# Patient Record
Sex: FEMALE | Race: WHITE | Employment: FULL TIME | URBAN - METROPOLITAN AREA
[De-identification: names, ages, dates, MRNs, and addresses within clinical notes are randomized per-mention and may not be internally consistent; named-entity substitution may affect disease eponyms.]

---

## 2018-03-13 PROBLEM — K58.9 IRRITABLE BOWEL SYNDROME: Status: ACTIVE | Noted: 2018-03-13

## 2018-03-13 PROBLEM — J30.9 ALLERGIC RHINITIS: Status: ACTIVE | Noted: 2018-03-13

## 2018-03-13 PROBLEM — E78.2 MIXED HYPERLIPIDEMIA: Status: ACTIVE | Noted: 2018-03-13

## 2018-03-13 PROBLEM — K21.9 ESOPHAGEAL REFLUX: Status: ACTIVE | Noted: 2018-03-13

## 2018-03-13 PROBLEM — F17.200 TOBACCO USE DISORDER: Status: ACTIVE | Noted: 2018-03-13

## 2018-03-13 PROBLEM — R56.9 ALCOHOL WITHDRAWAL SEIZURE, UNCOMPLICATED (HCC): Status: ACTIVE | Noted: 2018-03-13

## 2018-03-13 PROBLEM — F41.1 GENERALIZED ANXIETY DISORDER: Status: ACTIVE | Noted: 2018-03-13

## 2018-03-13 PROBLEM — F10.930 ALCOHOL WITHDRAWAL SEIZURE, UNCOMPLICATED (HCC): Status: ACTIVE | Noted: 2018-03-13

## 2018-03-13 PROBLEM — B00.2 HERPETIC GINGIVOSTOMATITIS: Status: ACTIVE | Noted: 2018-03-13

## 2018-03-19 PROBLEM — D53.9 MACROCYTIC ANEMIA: Status: ACTIVE | Noted: 2018-03-19

## 2018-03-20 PROBLEM — Z86.59 HX OF MAJOR DEPRESSION: Status: ACTIVE | Noted: 2018-03-20

## 2018-03-26 PROBLEM — D53.9 MACROCYTIC ANEMIA: Status: RESOLVED | Noted: 2018-03-19 | Resolved: 2018-03-26

## 2018-03-26 PROBLEM — K58.9 IRRITABLE BOWEL SYNDROME: Status: RESOLVED | Noted: 2018-03-13 | Resolved: 2018-03-26

## 2018-03-26 PROBLEM — F17.200 TOBACCO USE DISORDER: Status: RESOLVED | Noted: 2018-03-13 | Resolved: 2018-03-26

## 2018-03-26 PROBLEM — F10.930 ALCOHOL WITHDRAWAL SEIZURE, UNCOMPLICATED (HCC): Status: RESOLVED | Noted: 2018-03-13 | Resolved: 2018-03-26

## 2018-03-26 PROBLEM — F41.1 GENERALIZED ANXIETY DISORDER: Status: RESOLVED | Noted: 2018-03-13 | Resolved: 2018-03-26

## 2018-03-26 PROBLEM — K21.9 ESOPHAGEAL REFLUX: Status: RESOLVED | Noted: 2018-03-13 | Resolved: 2018-03-26

## 2018-03-26 PROBLEM — B00.2 HERPETIC GINGIVOSTOMATITIS: Status: RESOLVED | Noted: 2018-03-13 | Resolved: 2018-03-26

## 2018-03-26 PROBLEM — R56.9 ALCOHOL WITHDRAWAL SEIZURE, UNCOMPLICATED (HCC): Status: RESOLVED | Noted: 2018-03-13 | Resolved: 2018-03-26

## 2018-03-26 PROBLEM — Z86.59 HX OF MAJOR DEPRESSION: Status: RESOLVED | Noted: 2018-03-20 | Resolved: 2018-03-26

## 2018-03-26 PROBLEM — E78.2 MIXED HYPERLIPIDEMIA: Status: RESOLVED | Noted: 2018-03-13 | Resolved: 2018-03-26

## 2018-03-26 PROBLEM — J30.9 ALLERGIC RHINITIS: Status: RESOLVED | Noted: 2018-03-13 | Resolved: 2018-03-26

## 2018-03-27 PROBLEM — F60.7 DEPENDENT PERSONALITY DISORDER (HCC): Status: ACTIVE | Noted: 2018-03-27

## 2018-04-02 PROBLEM — F39 MOOD DISORDER (HCC): Status: ACTIVE | Noted: 2018-04-02

## 2019-03-25 ENCOUNTER — HOSPITAL ENCOUNTER (EMERGENCY)
Age: 54
Discharge: HOME | End: 2019-03-25

## 2019-03-25 ENCOUNTER — APPOINTMENT (OUTPATIENT)
Dept: GENERAL RADIOLOGY | Age: 54
End: 2019-03-25

## 2019-03-25 VITALS
WEIGHT: 190.48 LBS | RESPIRATION RATE: 18 BRPM | HEART RATE: 83 BPM | TEMPERATURE: 98.5 F | HEIGHT: 62 IN | OXYGEN SATURATION: 98 % | BODY MASS INDEX: 35.05 KG/M2 | DIASTOLIC BLOOD PRESSURE: 94 MMHG | SYSTOLIC BLOOD PRESSURE: 138 MMHG

## 2019-03-25 DIAGNOSIS — S20.212A CONTUSION OF LEFT CHEST WALL, INITIAL ENCOUNTER: ICD-10-CM

## 2019-03-25 DIAGNOSIS — S22.42XA CLOSED FRACTURE OF MULTIPLE RIBS OF LEFT SIDE, INITIAL ENCOUNTER: Primary | ICD-10-CM

## 2019-03-25 PROCEDURE — 6370000000 HC RX 637 (ALT 250 FOR IP): Performed by: NURSE PRACTITIONER

## 2019-03-25 PROCEDURE — 99283 EMERGENCY DEPT VISIT LOW MDM: CPT

## 2019-03-25 PROCEDURE — 71101 X-RAY EXAM UNILAT RIBS/CHEST: CPT

## 2019-03-25 RX ORDER — LIDOCAINE 50 MG/G
1 PATCH TOPICAL DAILY
Qty: 30 PATCH | Refills: 0 | Status: ON HOLD | OUTPATIENT
Start: 2019-03-25 | End: 2020-12-01 | Stop reason: HOSPADM

## 2019-03-25 RX ORDER — METHOCARBAMOL 500 MG/1
500 TABLET, FILM COATED ORAL 3 TIMES DAILY
Qty: 30 TABLET | Refills: 0 | Status: SHIPPED | OUTPATIENT
Start: 2019-03-25 | End: 2019-04-04

## 2019-03-25 RX ORDER — TRAMADOL HYDROCHLORIDE 50 MG/1
50 TABLET ORAL ONCE
Status: COMPLETED | OUTPATIENT
Start: 2019-03-25 | End: 2019-03-25

## 2019-03-25 RX ORDER — ORPHENADRINE CITRATE 100 MG/1
100 TABLET, EXTENDED RELEASE ORAL ONCE
Status: COMPLETED | OUTPATIENT
Start: 2019-03-25 | End: 2019-03-25

## 2019-03-25 RX ORDER — TRAMADOL HYDROCHLORIDE 50 MG/1
TABLET ORAL
Qty: 21 TABLET | Refills: 0 | Status: SHIPPED | OUTPATIENT
Start: 2019-03-25 | End: 2019-03-31

## 2019-03-25 RX ADMIN — ORPHENADRINE CITRATE 100 MG: 100 TABLET, EXTENDED RELEASE ORAL at 20:41

## 2019-03-25 RX ADMIN — TRAMADOL HYDROCHLORIDE 50 MG: 50 TABLET, FILM COATED ORAL at 20:40

## 2019-03-25 ASSESSMENT — PAIN DESCRIPTION - LOCATION
LOCATION: RIB CAGE
LOCATION: RIB CAGE

## 2019-03-25 ASSESSMENT — ENCOUNTER SYMPTOMS
SHORTNESS OF BREATH: 1
GASTROINTESTINAL NEGATIVE: 1

## 2019-03-25 ASSESSMENT — PAIN SCALES - GENERAL
PAINLEVEL_OUTOF10: 8
PAINLEVEL_OUTOF10: 10

## 2019-03-25 ASSESSMENT — PAIN DESCRIPTION - FREQUENCY: FREQUENCY: CONTINUOUS

## 2019-03-25 ASSESSMENT — PAIN DESCRIPTION - DESCRIPTORS
DESCRIPTORS: JABBING
DESCRIPTORS: JABBING

## 2019-03-25 ASSESSMENT — PAIN DESCRIPTION - PAIN TYPE
TYPE: ACUTE PAIN
TYPE: ACUTE PAIN

## 2019-03-25 ASSESSMENT — PAIN DESCRIPTION - ONSET: ONSET: ON-GOING

## 2019-03-25 ASSESSMENT — PAIN DESCRIPTION - ORIENTATION
ORIENTATION: LEFT
ORIENTATION: LEFT

## 2019-03-25 ASSESSMENT — PAIN DESCRIPTION - PROGRESSION: CLINICAL_PROGRESSION: RAPIDLY WORSENING

## 2019-05-19 ENCOUNTER — APPOINTMENT (OUTPATIENT)
Dept: CT IMAGING | Age: 54
End: 2019-05-19

## 2019-05-19 ENCOUNTER — HOSPITAL ENCOUNTER (EMERGENCY)
Age: 54
Discharge: HOME | End: 2019-05-19

## 2019-05-19 VITALS
HEART RATE: 83 BPM | SYSTOLIC BLOOD PRESSURE: 155 MMHG | OXYGEN SATURATION: 100 % | DIASTOLIC BLOOD PRESSURE: 98 MMHG | BODY MASS INDEX: 29.07 KG/M2 | RESPIRATION RATE: 18 BRPM | WEIGHT: 158.95 LBS | TEMPERATURE: 97 F

## 2019-05-19 DIAGNOSIS — B37.31 VAGINAL YEAST INFECTION: ICD-10-CM

## 2019-05-19 DIAGNOSIS — R10.12 LEFT UPPER QUADRANT PAIN: Primary | ICD-10-CM

## 2019-05-19 DIAGNOSIS — R11.0 NAUSEA: ICD-10-CM

## 2019-05-19 DIAGNOSIS — A59.01 TRICHOMONAS VAGINALIS (TV) INFECTION: ICD-10-CM

## 2019-05-19 LAB
A/G RATIO: 1.5 (ref 1.1–2.2)
ALBUMIN SERPL-MCNC: 4.5 G/DL (ref 3.4–5)
ALP BLD-CCNC: 109 U/L (ref 40–129)
ALT SERPL-CCNC: 28 U/L (ref 10–40)
ANION GAP SERPL CALCULATED.3IONS-SCNC: 12 MMOL/L (ref 3–16)
AST SERPL-CCNC: 23 U/L (ref 15–37)
BACTERIA WET PREP: ABNORMAL
BACTERIA: ABNORMAL /HPF
BASOPHILS ABSOLUTE: 0.1 K/UL (ref 0–0.2)
BASOPHILS RELATIVE PERCENT: 0.8 %
BILIRUB SERPL-MCNC: 0.6 MG/DL (ref 0–1)
BILIRUBIN URINE: NEGATIVE
BLOOD, URINE: NEGATIVE
BUN BLDV-MCNC: 16 MG/DL (ref 7–20)
CALCIUM SERPL-MCNC: 9.9 MG/DL (ref 8.3–10.6)
CHLORIDE BLD-SCNC: 103 MMOL/L (ref 99–110)
CLARITY: ABNORMAL
CLUE CELLS: ABNORMAL
CO2: 24 MMOL/L (ref 21–32)
COLOR: ABNORMAL
COMMENT UA: ABNORMAL
CREAT SERPL-MCNC: 0.6 MG/DL (ref 0.6–1.1)
CRYSTALS, UA: ABNORMAL /HPF
EOSINOPHILS ABSOLUTE: 0.3 K/UL (ref 0–0.6)
EOSINOPHILS RELATIVE PERCENT: 4.6 %
EPITHELIAL CELLS WET PREP: ABNORMAL
EPITHELIAL CELLS, UA: 11 /HPF (ref 0–5)
GFR AFRICAN AMERICAN: >60
GFR NON-AFRICAN AMERICAN: >60
GLOBULIN: 3.1 G/DL
GLUCOSE BLD-MCNC: 85 MG/DL (ref 70–99)
GLUCOSE URINE: NEGATIVE MG/DL
HCT VFR BLD CALC: 44.4 % (ref 36–48)
HEMOGLOBIN: 15.3 G/DL (ref 12–16)
HYALINE CASTS: 10 /LPF (ref 0–8)
KETONES, URINE: NEGATIVE MG/DL
LACTIC ACID: 0.8 MMOL/L (ref 0.4–2)
LEUKOCYTE ESTERASE, URINE: ABNORMAL
LYMPHOCYTES ABSOLUTE: 2.1 K/UL (ref 1–5.1)
LYMPHOCYTES RELATIVE PERCENT: 28.8 %
MCH RBC QN AUTO: 34.2 PG (ref 26–34)
MCHC RBC AUTO-ENTMCNC: 34.4 G/DL (ref 31–36)
MCV RBC AUTO: 99.5 FL (ref 80–100)
MICROSCOPIC EXAMINATION: YES
MONOCYTES ABSOLUTE: 0.5 K/UL (ref 0–1.3)
MONOCYTES RELATIVE PERCENT: 7.4 %
NEUTROPHILS ABSOLUTE: 4.3 K/UL (ref 1.7–7.7)
NEUTROPHILS RELATIVE PERCENT: 58.4 %
NITRITE, URINE: NEGATIVE
PDW BLD-RTO: 12.8 % (ref 12.4–15.4)
PH UA: 5.5 (ref 5–8)
PLATELET # BLD: 392 K/UL (ref 135–450)
PMV BLD AUTO: 8.2 FL (ref 5–10.5)
POTASSIUM SERPL-SCNC: 4.4 MMOL/L (ref 3.5–5.1)
PROTEIN UA: NEGATIVE MG/DL
RBC # BLD: 4.47 M/UL (ref 4–5.2)
RBC UA: ABNORMAL /HPF (ref 0–2)
RBC WET PREP: ABNORMAL
SODIUM BLD-SCNC: 139 MMOL/L (ref 136–145)
SOURCE WET PREP: ABNORMAL
SPECIFIC GRAVITY UA: 1.03 (ref 1–1.03)
TOTAL PROTEIN: 7.6 G/DL (ref 6.4–8.2)
TRICHOMONAS PREP: ABNORMAL
URINE REFLEX TO CULTURE: YES
URINE TYPE: ABNORMAL
UROBILINOGEN, URINE: 1 E.U./DL
WBC # BLD: 7.4 K/UL (ref 4–11)
WBC UA: 14 /HPF (ref 0–5)
WBC WET PREP: ABNORMAL
YEAST WET PREP: ABNORMAL

## 2019-05-19 PROCEDURE — 2580000003 HC RX 258: Performed by: NURSE PRACTITIONER

## 2019-05-19 PROCEDURE — 99284 EMERGENCY DEPT VISIT MOD MDM: CPT

## 2019-05-19 PROCEDURE — 96374 THER/PROPH/DIAG INJ IV PUSH: CPT

## 2019-05-19 PROCEDURE — 6370000000 HC RX 637 (ALT 250 FOR IP): Performed by: NURSE PRACTITIONER

## 2019-05-19 PROCEDURE — 87210 SMEAR WET MOUNT SALINE/INK: CPT

## 2019-05-19 PROCEDURE — 74177 CT ABD & PELVIS W/CONTRAST: CPT

## 2019-05-19 PROCEDURE — 83605 ASSAY OF LACTIC ACID: CPT

## 2019-05-19 PROCEDURE — 6360000002 HC RX W HCPCS: Performed by: NURSE PRACTITIONER

## 2019-05-19 PROCEDURE — 36415 COLL VENOUS BLD VENIPUNCTURE: CPT

## 2019-05-19 PROCEDURE — 96361 HYDRATE IV INFUSION ADD-ON: CPT

## 2019-05-19 PROCEDURE — 96375 TX/PRO/DX INJ NEW DRUG ADDON: CPT

## 2019-05-19 PROCEDURE — 80053 COMPREHEN METABOLIC PANEL: CPT

## 2019-05-19 PROCEDURE — 87086 URINE CULTURE/COLONY COUNT: CPT

## 2019-05-19 PROCEDURE — 6360000004 HC RX CONTRAST MEDICATION: Performed by: NURSE PRACTITIONER

## 2019-05-19 PROCEDURE — 81001 URINALYSIS AUTO W/SCOPE: CPT

## 2019-05-19 PROCEDURE — 85025 COMPLETE CBC W/AUTO DIFF WBC: CPT

## 2019-05-19 RX ORDER — DICYCLOMINE HYDROCHLORIDE 10 MG/1
10 CAPSULE ORAL 4 TIMES DAILY PRN
Qty: 30 CAPSULE | Refills: 0 | Status: ON HOLD | OUTPATIENT
Start: 2019-05-19 | End: 2020-12-01 | Stop reason: HOSPADM

## 2019-05-19 RX ORDER — 0.9 % SODIUM CHLORIDE 0.9 %
1000 INTRAVENOUS SOLUTION INTRAVENOUS ONCE
Status: COMPLETED | OUTPATIENT
Start: 2019-05-19 | End: 2019-05-19

## 2019-05-19 RX ORDER — DULOXETIN HYDROCHLORIDE 30 MG/1
30 CAPSULE, DELAYED RELEASE ORAL DAILY
Status: ON HOLD | COMMUNITY
End: 2020-12-01 | Stop reason: HOSPADM

## 2019-05-19 RX ORDER — KETOROLAC TROMETHAMINE 30 MG/ML
15 INJECTION, SOLUTION INTRAMUSCULAR; INTRAVENOUS ONCE
Status: COMPLETED | OUTPATIENT
Start: 2019-05-19 | End: 2019-05-19

## 2019-05-19 RX ORDER — IBUPROFEN 600 MG/1
600 TABLET ORAL EVERY 6 HOURS PRN
Qty: 30 TABLET | Refills: 0 | Status: ON HOLD | OUTPATIENT
Start: 2019-05-19 | End: 2020-12-01 | Stop reason: HOSPADM

## 2019-05-19 RX ORDER — ONDANSETRON 2 MG/ML
4 INJECTION INTRAMUSCULAR; INTRAVENOUS ONCE
Status: COMPLETED | OUTPATIENT
Start: 2019-05-19 | End: 2019-05-19

## 2019-05-19 RX ORDER — FLUCONAZOLE 150 MG/1
150 TABLET ORAL ONCE
Qty: 2 TABLET | Refills: 0 | Status: SHIPPED | OUTPATIENT
Start: 2019-05-19 | End: 2019-05-19

## 2019-05-19 RX ORDER — DULOXETIN HYDROCHLORIDE 60 MG/1
60 CAPSULE, DELAYED RELEASE ORAL DAILY
Status: ON HOLD | COMMUNITY
End: 2020-12-01 | Stop reason: HOSPADM

## 2019-05-19 RX ORDER — ONDANSETRON 4 MG/1
4-8 TABLET, ORALLY DISINTEGRATING ORAL EVERY 12 HOURS PRN
Qty: 12 TABLET | Refills: 0 | Status: ON HOLD | OUTPATIENT
Start: 2019-05-19 | End: 2020-12-01 | Stop reason: HOSPADM

## 2019-05-19 RX ORDER — METRONIDAZOLE 500 MG/1
2000 TABLET ORAL ONCE
Status: COMPLETED | OUTPATIENT
Start: 2019-05-19 | End: 2019-05-19

## 2019-05-19 RX ADMIN — METRONIDAZOLE 2000 MG: 500 TABLET ORAL at 17:32

## 2019-05-19 RX ADMIN — SODIUM CHLORIDE 1000 ML: 9 INJECTION, SOLUTION INTRAVENOUS at 14:43

## 2019-05-19 RX ADMIN — KETOROLAC TROMETHAMINE 15 MG: 30 INJECTION, SOLUTION INTRAMUSCULAR at 14:42

## 2019-05-19 RX ADMIN — ONDANSETRON 4 MG: 2 INJECTION INTRAMUSCULAR; INTRAVENOUS at 14:42

## 2019-05-19 RX ADMIN — IOPAMIDOL 75 ML: 755 INJECTION, SOLUTION INTRAVENOUS at 14:02

## 2019-05-19 ASSESSMENT — PAIN DESCRIPTION - PROGRESSION
CLINICAL_PROGRESSION: GRADUALLY WORSENING
CLINICAL_PROGRESSION: GRADUALLY WORSENING

## 2019-05-19 ASSESSMENT — ENCOUNTER SYMPTOMS
RESPIRATORY NEGATIVE: 1
VOMITING: 0
NAUSEA: 1
DIARRHEA: 0
ABDOMINAL PAIN: 1

## 2019-05-19 ASSESSMENT — PAIN DESCRIPTION - FREQUENCY
FREQUENCY: CONTINUOUS
FREQUENCY: CONTINUOUS

## 2019-05-19 ASSESSMENT — PAIN DESCRIPTION - LOCATION
LOCATION: ABDOMEN
LOCATION: ABDOMEN

## 2019-05-19 ASSESSMENT — PAIN - FUNCTIONAL ASSESSMENT
PAIN_FUNCTIONAL_ASSESSMENT: ACTIVITIES ARE NOT PREVENTED
PAIN_FUNCTIONAL_ASSESSMENT: ACTIVITIES ARE NOT PREVENTED

## 2019-05-19 ASSESSMENT — PAIN DESCRIPTION - ONSET
ONSET: SUDDEN
ONSET: SUDDEN

## 2019-05-19 ASSESSMENT — PAIN SCALES - GENERAL
PAINLEVEL_OUTOF10: 10
PAINLEVEL_OUTOF10: 8
PAINLEVEL_OUTOF10: 10

## 2019-05-19 ASSESSMENT — PAIN DESCRIPTION - ORIENTATION
ORIENTATION: LEFT;LOWER
ORIENTATION: LEFT;LOWER

## 2019-05-19 ASSESSMENT — PAIN DESCRIPTION - DESCRIPTORS
DESCRIPTORS: THROBBING
DESCRIPTORS: THROBBING

## 2019-05-19 ASSESSMENT — PAIN DESCRIPTION - PAIN TYPE: TYPE: ACUTE PAIN

## 2019-05-19 NOTE — ED PROVIDER NOTES
well-developed and well-nourished. No distress. HENT:   Head: Normocephalic. Eyes: Pupils are equal, round, and reactive to light. Cardiovascular: Normal rate and regular rhythm. Pulmonary/Chest: Effort normal.   Abdominal: Soft. Normal appearance and bowel sounds are normal. She exhibits no mass. There is tenderness in the left upper quadrant. There is no rebound, no guarding and negative Allred's sign. No hernia. Hernia confirmed negative in the ventral area, confirmed negative in the right inguinal area and confirmed negative in the left inguinal area. Negative psoas sign, negative obturator sign, negative roving sign. No palpable masses. No CVA tenderness. Genitourinary:   Genitourinary Comments: Bedside speculum pelvic exam performed with RN chaperone: Palak. External genitalia unremarkable. No bumps lumps or lesions. Internal exam negative for cervical motion tenderness adnexal tenderness, bumps lumps or lesions. Minimal discharge noted. Cervix unremarkable. Neurological: She is alert and oriented to person, place, and time. Skin: Skin is warm. Capillary refill takes less than 2 seconds. She is not diaphoretic. Nursing note and vitals reviewed. DIAGNOSTIC RESULTS     EKG: All EKG's are interpreted by the Emergency Department Physician who either signs or Co-signs this chart in the absence of a cardiologist.    RADIOLOGY:   Non-plain film images such as CT, Ultrasound and MRI are read by the radiologist. Plain radiographic images are visualized and preliminarilyinterpreted by the emergency physician with the below findings:    Interpretation per the Radiologist below,if available at the time of this note:    CT ABDOMEN PELVIS W IV CONTRAST Additional Contrast? None   Final Result   There are a couple mildly dilated loops of small bowel in the left anterior   abdomen which may either be due to focal ileus or low grade/early obstruction.       New small to moderate-sized hiatal hernia. LABS:  Labs Reviewed   WET PREP, GENITAL - Abnormal; Notable for the following components:       Result Value    Trichomonas Prep PRESENT <1+ (*)     Yeast, Wet Prep 1+ (*)     All other components within normal limits    Narrative:     Performed at:  [address] [employer]   Phone [phone]   CBC WITH AUTO DIFFERENTIAL - Abnormal; Notable for the following components:    MCH 34.2 (*)     All other components within normal limits    Narrative:     Performed at:  [address] [facility]   Phone [phone]   URINE RT REFLEX TO CULTURE - Abnormal; Notable for the following components:    Clarity, UA CLOUDY (*)     Leukocyte Esterase, Urine MODERATE (*)     All other components within normal limits    Narrative:     Performed at:  [address] [facility]   Phone [phone]   MICROSCOPIC URINALYSIS - Abnormal; Notable for the following components:    Bacteria, UA 1+ (*)     Crystals 2+ Ca. Oxalate (*)     Hyaline Casts, UA 10 (*)     WBC, UA 14 (*)     Epi Cells 11 (*)     All other components within normal limits    Narrative:     Performed at:  [address] [facility]   Phone [phone]   URINE CULTURE   COMPREHENSIVE METABOLIC PANEL    Narrative:     Performed at:  [address] [facility]   Phone [phone]   LACTIC ACID, PLASMA    Narrative:     Performed at:  [address] [facility]   Phone [phone]       All other labs were within normal range or not returned as of this dictation.     EMERGENCY DEPARTMENT COURSE and DIFFERENTIAL DIAGNOSIS/MDM:   Vitals:    Vitals:    05/19/19 1238 05/19/19 1245 05/19/19 1623   BP:  (!) 154/99 (!) 155/98   Pulse: 80  83   Resp: 18  18   Temp: 97 °F (36.1 °C)     TempSrc: Tympanic     SpO2: 97%  100%   Weight: 158 lb 15.2 oz (72.1 kg)         MDM  Differential diagnosis: Diverticulitis, bowel obstruction, UTI, ureteral obstruction, urolithiasis    Patient was seen and evaluated per myself. Dr. Renetta Medeiros was present and available for consultation as needed. CBC negative for leukocytosis. Chemistry profiles unremarkable without abnormality. No evidence of lactic acidosis. Urine versus indicates moderate leukocytes however the microscopic findings indicate: 1+ bacteria, 14 white blood cells and 11 epithelial cells. I don't believe that this is representative of urinary tract infection. But a culture will be process. Wet prep results: Positive for Trichomonas and positive for yeast. Negative clue cells. CT results negative for evidence of free air or free fluid. No appendicitis. There is some enlargement of the small bowel loops in the left upper quadrant anterior abdomen. That may be consistent with an ileus versus an early uncomplicated obstruction. No evidence of free air or free fluid otherwise. Patient's nausea increasing after being on the nitrofurantoin may be related to the fact that she actually lists this as an allergy or sensitivity. Therefore right believe that that is the reason for the increase in nausea since starting the medication. There is no evidence of acute diverticulitis. There is no true evidence at this time that there is a bowel obstruction. She is able to tolerate food and fluids therefore I do not feel that there is a small bowel obstruction. She's not passing any liquid diarrhea in fact she is passing formed stools. There is no evidence of ureteral obstruction or urolithiasis. No evidence of pancreatitis. No indication of cholecystitis. Given her wet prep results she will receive Flagyl 2 g here in the emergency department.  I will discharge her home with symptomatic treatment Zofran, Bentyl, Diflucan for yeast infection and Motrin. The Motrin Bentyl and Zofran are sent to medical treatment as I discussed with her for the abdominal pain. Her abdominal pain may be very well secondary to mild yeast infection and a Trichomonas pelvic infection. She is discharged home to follow-up with her primary care provider and/or her gynecologist. She is instructed on avoidance of sex for at least 2 weeks and making sure that her partner/partners are treated as well. CONSULTS:  None    PROCEDURES:  Procedures    FINAL IMPRESSION      1. Left upper quadrant pain    2. Nausea    3. Trichomonas vaginalis (TV) infection    4.  Vaginal yeast infection          DISPOSITION/PLAN   [unfilled]    PATIENT REFERRED TO:  MITCHELL Barbour NP  . [address] [place] [address]  [phone]    Schedule an appointment as soon as possible for a visit         DISCHARGE MEDICATIONS:  New Prescriptions    DICYCLOMINE (BENTYL) 10 MG CAPSULE    Take 1 capsule by mouth 4 times daily as needed (abdominal pain)    FLUCONAZOLE (DIFLUCAN) 150 MG TABLET    Take 1 tablet by mouth once for 1 dose Take one and repeat in 1 week    IBUPROFEN (IBU) 600 MG TABLET    Take 1 tablet by mouth every 6 hours as needed for Pain    ONDANSETRON (ZOFRAN ODT) 4 MG DISINTEGRATING TABLET    Take 1-2 tablets by mouth every 12 hours as needed for Nausea       (Please note that portions of this note were completed with a voice recognition program.  Efforts were made to edit the dictations but occasionally words are mis-transcribed.)    MITCHELL Mcclain CNP, MITCHELL Larson CNP  05/19/19 0025

## 2019-05-19 NOTE — LETTER
[facility] Emergency Department  [facility] [address]  Phone: [phone]               May 19, 2019    Patient: Donita Olvera   YOB: 1965   Date of Visit: 5/19/2019       To Whom It May Concern:    Donita Olvera was seen and treated in our emergency department on 5/19/2019. She may return to work on 5/21/19.       Sincerely,       Melonie Kehr, RN         Signature:__________________________________

## 2019-05-20 LAB — URINE CULTURE, ROUTINE: NORMAL

## 2020-11-26 ENCOUNTER — HOSPITAL ENCOUNTER (EMERGENCY)
Age: 55
Discharge: PSYCHIATRIC OR SUBSTANCE USE FACILITY | End: 2020-11-27
Attending: STUDENT IN AN ORGANIZED HEALTH CARE EDUCATION/TRAINING PROGRAM

## 2020-11-26 PROBLEM — F39 UNSPECIFIED MOOD (AFFECTIVE) DISORDER (HCC): Status: ACTIVE | Noted: 2020-11-26

## 2020-11-26 LAB
A/G RATIO: 1.6 (ref 1.1–2.2)
ACETAMINOPHEN LEVEL: <5 UG/ML (ref 10–30)
ACETAMINOPHEN LEVEL: <5 UG/ML (ref 10–30)
ALBUMIN SERPL-MCNC: 4.7 G/DL (ref 3.4–5)
ALP BLD-CCNC: 126 U/L (ref 40–129)
ALT SERPL-CCNC: 37 U/L (ref 10–40)
AMPHETAMINE SCREEN, URINE: ABNORMAL
ANION GAP SERPL CALCULATED.3IONS-SCNC: 13 MMOL/L (ref 3–16)
AST SERPL-CCNC: 35 U/L (ref 15–37)
BARBITURATE SCREEN URINE: ABNORMAL
BASOPHILS ABSOLUTE: 0.1 K/UL (ref 0–0.2)
BASOPHILS RELATIVE PERCENT: 1.4 %
BENZODIAZEPINE SCREEN, URINE: ABNORMAL
BILIRUB SERPL-MCNC: 0.5 MG/DL (ref 0–1)
BILIRUBIN URINE: NEGATIVE
BLOOD, URINE: NEGATIVE
BUN BLDV-MCNC: 9 MG/DL (ref 7–20)
CALCIUM SERPL-MCNC: 10 MG/DL (ref 8.3–10.6)
CANNABINOID SCREEN URINE: POSITIVE
CHLORIDE BLD-SCNC: 103 MMOL/L (ref 99–110)
CLARITY: ABNORMAL
CO2: 25 MMOL/L (ref 21–32)
COCAINE METABOLITE SCREEN URINE: ABNORMAL
COLOR: YELLOW
COMMENT UA: ABNORMAL
CREAT SERPL-MCNC: 0.7 MG/DL (ref 0.6–1.1)
EOSINOPHILS ABSOLUTE: 0.2 K/UL (ref 0–0.6)
EOSINOPHILS RELATIVE PERCENT: 2.1 %
EPITHELIAL CELLS, UA: 11 /HPF (ref 0–5)
ETHANOL: 121 MG/DL (ref 0–0.08)
ETHANOL: 76 MG/DL (ref 0–0.08)
GFR AFRICAN AMERICAN: >60
GFR NON-AFRICAN AMERICAN: >60
GLOBULIN: 3 G/DL
GLUCOSE BLD-MCNC: 99 MG/DL (ref 70–99)
GLUCOSE URINE: NEGATIVE MG/DL
HCG QUALITATIVE: NEGATIVE
HCT VFR BLD CALC: 44.8 % (ref 36–48)
HEMOGLOBIN: 15.3 G/DL (ref 12–16)
HYALINE CASTS: 2 /LPF (ref 0–8)
KETONES, URINE: NEGATIVE MG/DL
LEUKOCYTE ESTERASE, URINE: ABNORMAL
LYMPHOCYTES ABSOLUTE: 3.2 K/UL (ref 1–5.1)
LYMPHOCYTES RELATIVE PERCENT: 33.7 %
Lab: ABNORMAL
MCH RBC QN AUTO: 33.3 PG (ref 26–34)
MCHC RBC AUTO-ENTMCNC: 34.1 G/DL (ref 31–36)
MCV RBC AUTO: 97.5 FL (ref 80–100)
METHADONE SCREEN, URINE: ABNORMAL
MICROSCOPIC EXAMINATION: YES
MONOCYTES ABSOLUTE: 0.6 K/UL (ref 0–1.3)
MONOCYTES RELATIVE PERCENT: 6.2 %
NEUTROPHILS ABSOLUTE: 5.4 K/UL (ref 1.7–7.7)
NEUTROPHILS RELATIVE PERCENT: 56.6 %
NITRITE, URINE: NEGATIVE
OPIATE SCREEN URINE: ABNORMAL
OXYCODONE URINE: ABNORMAL
PDW BLD-RTO: 13.8 % (ref 12.4–15.4)
PH UA: 6
PH UA: 6 (ref 5–8)
PHENCYCLIDINE SCREEN URINE: ABNORMAL
PLATELET # BLD: 313 K/UL (ref 135–450)
PMV BLD AUTO: 8 FL (ref 5–10.5)
POTASSIUM REFLEX MAGNESIUM: 3.6 MMOL/L (ref 3.5–5.1)
PROPOXYPHENE SCREEN: ABNORMAL
PROTEIN UA: NEGATIVE MG/DL
RBC # BLD: 4.6 M/UL (ref 4–5.2)
RBC UA: ABNORMAL /HPF (ref 0–4)
SALICYLATE, SERUM: 0.4 MG/DL (ref 15–30)
SARS-COV-2, NAAT: NOT DETECTED
SODIUM BLD-SCNC: 141 MMOL/L (ref 136–145)
SPECIFIC GRAVITY UA: <1.005 (ref 1–1.03)
TOTAL PROTEIN: 7.7 G/DL (ref 6.4–8.2)
URINE REFLEX TO CULTURE: YES
URINE TYPE: ABNORMAL
UROBILINOGEN, URINE: 0.2 E.U./DL
WBC # BLD: 9.6 K/UL (ref 4–11)
WBC UA: 14 /HPF (ref 0–5)

## 2020-11-26 PROCEDURE — 85025 COMPLETE CBC W/AUTO DIFF WBC: CPT

## 2020-11-26 PROCEDURE — U0002 COVID-19 LAB TEST NON-CDC: HCPCS

## 2020-11-26 PROCEDURE — G0480 DRUG TEST DEF 1-7 CLASSES: HCPCS

## 2020-11-26 PROCEDURE — 93005 ELECTROCARDIOGRAM TRACING: CPT | Performed by: PHYSICIAN ASSISTANT

## 2020-11-26 PROCEDURE — 6370000000 HC RX 637 (ALT 250 FOR IP): Performed by: EMERGENCY MEDICINE

## 2020-11-26 PROCEDURE — 80053 COMPREHEN METABOLIC PANEL: CPT

## 2020-11-26 PROCEDURE — 87086 URINE CULTURE/COLONY COUNT: CPT

## 2020-11-26 PROCEDURE — 87077 CULTURE AEROBIC IDENTIFY: CPT

## 2020-11-26 PROCEDURE — 81001 URINALYSIS AUTO W/SCOPE: CPT

## 2020-11-26 PROCEDURE — 99285 EMERGENCY DEPT VISIT HI MDM: CPT

## 2020-11-26 PROCEDURE — 84703 CHORIONIC GONADOTROPIN ASSAY: CPT

## 2020-11-26 PROCEDURE — 80307 DRUG TEST PRSMV CHEM ANLYZR: CPT

## 2020-11-26 PROCEDURE — 36415 COLL VENOUS BLD VENIPUNCTURE: CPT

## 2020-11-26 RX ORDER — ONDANSETRON 4 MG/1
4 TABLET, ORALLY DISINTEGRATING ORAL ONCE
Status: COMPLETED | OUTPATIENT
Start: 2020-11-26 | End: 2020-11-26

## 2020-11-26 RX ADMIN — ONDANSETRON 4 MG: 4 TABLET, ORALLY DISINTEGRATING ORAL at 23:45

## 2020-11-26 ASSESSMENT — ENCOUNTER SYMPTOMS
BACK PAIN: 0
PHOTOPHOBIA: 0
SHORTNESS OF BREATH: 0
CHEST TIGHTNESS: 0
WHEEZING: 0
COLOR CHANGE: 0
COUGH: 0
DIARRHEA: 0
CONSTIPATION: 0
STRIDOR: 0
RESPIRATORY NEGATIVE: 1
VOMITING: 0
NAUSEA: 0
ABDOMINAL PAIN: 0

## 2020-11-26 NOTE — ED NOTES
PT admitted to taking Neurontin, Vicodin and marijuana. PT very tearful stating she wants to go back to [facility] because they helped her a few years ago. Ubaldo at bedside.       Corina Case RN  11/26/20 2046

## 2020-11-26 NOTE — ED PROVIDER NOTES
MidLevel Attestation   I havepersonally performed and/or participated in the history, exam and medical decision making and agree with all pertinent clinical information. I have also reviewed and agree with the past medical, family and social historyunless otherwise noted. I have personally performed a face to face diagnostic evaluation onthis patient. I have reviewed the mid-levels findings and agree. In brief, Pilar Hercules is a 48 y.o. female that presented to the emergency department with   Chief Complaint   Patient presents with    Suicidal     Pt arrived via EMS with c/o intentional overdose of medication and alcohol in attempt to commit suicide. PT stated she just wants to go see her mom and she thought this would help. I asked where her mom was and she stated she just . PT with superficial cut marks to bilat UE     CONSTITUTIONAL: Well appearing, in no acute distress   EYES: PERRL, No injection, discharge or scleral icterus. NECK: Normal ROM, NO LAD and Moist mucous membranes. CARDIOVASCULAR: Regular rate and rhythm. No murmurs or gallop. PULMONARY/CHEST: Airway patent. No retractions. Breath sounds clear with good air entry bilaterally. ABDOMEN: Soft, Non-distended and non-tender, without guarding or rebound. SKIN: Acyanotic, warm, dry   MUSCULOSKELETAL: No swelling, tenderness or deformity   NEUROLOGICAL: Awake. Pulses intact. Grossly nonfocal     60-year-old female presenting with depression after ingesting multiple doses of medicines as well as consuming large amount of alcohol. Exam is unremarkable. Poison control was consulted and recommended repeat labs after 2 hours in addition to the initial labs. Repeat acetaminophen levels were normal.  At this time, patient is medically cleared to be transferred to psychiatry hospital for further treatment and evaluation.     EKG by my preliminary interpretation shows sinus rhythm with rate of 74, normal axis, normal intervals, with no ST changes indicative of ischemia at this time.       I have reviewed and interpreted all of the currently available lab results and diagnostics from this visit:  Results for orders placed or performed during the hospital encounter of 11/26/20   CBC Auto Differential   Result Value Ref Range    WBC 9.6 4.0 - 11.0 K/uL    RBC 4.60 4.00 - 5.20 M/uL    Hemoglobin 15.3 12.0 - 16.0 g/dL    Hematocrit 44.8 36.0 - 48.0 %    MCV 97.5 80.0 - 100.0 fL    MCH 33.3 26.0 - 34.0 pg    MCHC 34.1 31.0 - 36.0 g/dL    RDW 13.8 12.4 - 15.4 %    Platelets 657 357 - 498 K/uL    MPV 8.0 5.0 - 10.5 fL    Neutrophils % 56.6 %    Lymphocytes % 33.7 %    Monocytes % 6.2 %    Eosinophils % 2.1 %    Basophils % 1.4 %    Neutrophils Absolute 5.4 1.7 - 7.7 K/uL    Lymphocytes Absolute 3.2 1.0 - 5.1 K/uL    Monocytes Absolute 0.6 0.0 - 1.3 K/uL    Eosinophils Absolute 0.2 0.0 - 0.6 K/uL    Basophils Absolute 0.1 0.0 - 0.2 K/uL   Comprehensive Metabolic Panel w/ Reflex to MG   Result Value Ref Range    Sodium 141 136 - 145 mmol/L    Potassium reflex Magnesium 3.6 3.5 - 5.1 mmol/L    Chloride 103 99 - 110 mmol/L    CO2 25 21 - 32 mmol/L    Anion Gap 13 3 - 16    Glucose 99 70 - 99 mg/dL    BUN 9 7 - 20 mg/dL    CREATININE 0.7 0.6 - 1.1 mg/dL    GFR Non-African American >60 >60    GFR African American >60 >60    Calcium 10.0 8.3 - 10.6 mg/dL    Total Protein 7.7 6.4 - 8.2 g/dL    Alb 4.7 3.4 - 5.0 g/dL    Albumin/Globulin Ratio 1.6 1.1 - 2.2    Total Bilirubin 0.5 0.0 - 1.0 mg/dL    Alkaline Phosphatase 126 40 - 129 U/L    ALT 37 10 - 40 U/L    AST 35 15 - 37 U/L    Globulin 3.0 g/dL   Acetaminophen Level   Result Value Ref Range    Acetaminophen Level <5 (L) 10 - 30 ug/mL   Salicylate   Result Value Ref Range    Salicylate, Serum 0.4 (L) 15.0 - 30.0 mg/dL   Ethanol   Result Value Ref Range    Ethanol Lvl 121 mg/dL   Urinalysis Reflex to Culture    Specimen: Urine, clean catch   Result Value Ref Range    Color, UA YELLOW Straw/Yellow    Clarity, UA CLOUDY (A) Clear    Glucose, Ur Negative Negative mg/dL    Bilirubin Urine Negative Negative    Ketones, Urine Negative Negative mg/dL    Specific Gravity, UA <1.005 1.005 - 1.030    Blood, Urine Negative Negative    pH, UA 6.0 5.0 - 8.0    Protein, UA Negative Negative mg/dL    Urobilinogen, Urine 0.2 <2.0 E.U./dL    Nitrite, Urine Negative Negative    Leukocyte Esterase, Urine MODERATE (A) Negative    Microscopic Examination YES     Urine Type NotGiven     Urine Reflex to Culture Yes    Urine Drug Screen   Result Value Ref Range    Amphetamine Screen, Urine Neg Negative <1000ng/mL    Barbiturate Screen, Ur Neg Negative <200 ng/mL    Benzodiazepine Screen, Urine Neg Negative <200 ng/mL    Cannabinoid Scrn, Ur POSITIVE (A) Negative <50 ng/mL    Cocaine Metabolite Screen, Urine Neg Negative <300 ng/mL    Opiate Scrn, Ur Neg Negative <300 ng/mL    PCP Screen, Urine Neg Negative <25 ng/mL    Methadone Screen, Urine Neg Negative <300 ng/mL    Propoxyphene Scrn, Ur Neg Negative <300 ng/mL    Oxycodone Urine Neg Negative <100 ng/ml    pH, UA 6.0     Drug Screen Comment: see below    HCG Qualitative, Serum   Result Value Ref Range    hCG Qual Negative Detects HCG level >10 MIU/mL   Microscopic Urinalysis   Result Value Ref Range    RBC, UA 3-4 0 - 4 /HPF    Urinalysis Comments see below     Hyaline Casts, UA 2 0 - 8 /LPF    WBC, UA 14 (H) 0 - 5 /HPF    Epithelial Cells, UA 11 (H) 0 - 5 /HPF   Acetaminophen Level   Result Value Ref Range    Acetaminophen Level <5 (L) 10 - 30 ug/mL   EKG 12 Lead   Result Value Ref Range    Ventricular Rate 74 BPM    Atrial Rate 74 BPM    P-R Interval 138 ms    QRS Duration 84 ms    Q-T Interval 422 ms    QTc Calculation (Bazett) 468 ms    P Axis 35 degrees    R Axis 50 degrees    T Axis 45 degrees    Diagnosis Normal sinus rhythmNormal ECG      No results found. ED Medication Orders (From admission, onward)    None          Final Impression      1. Suicidal ideation    2.  Drug overdose, intentional self-harm, initial encounter [facility])        DISPOSITION Decision To Transfer 11/26/2020 09:35:48 PM       This record is transcribed utilizing voice recognition technology. There are inherent limitations in this technology. In addition, there may be limitations in editing of this report. If there are any discrepancies, please contact me directly.     García Guadarrama MD   11/26/2020         Alessandra Smith MD  11/26/20 6886

## 2020-11-26 NOTE — ED PROVIDER NOTES
palpitations and leg swelling. Gastrointestinal: Negative for abdominal pain, constipation, diarrhea, nausea and vomiting. Genitourinary: Negative for decreased urine volume, difficulty urinating, dysuria, flank pain, frequency, hematuria and urgency. Musculoskeletal: Negative for arthralgias, back pain, myalgias, neck pain and neck stiffness. Skin: Negative for color change, pallor, rash and wound. Neurological: Negative for dizziness, weakness, light-headedness, numbness and headaches. Psychiatric/Behavioral: Positive for dysphoric mood, self-injury and suicidal ideas. Negative for agitation, behavioral problems, confusion, decreased concentration, hallucinations and sleep disturbance. The patient is not nervous/anxious and is not hyperactive. Positives and Pertinent negatives as per HPI. Except as noted above in the ROS, all other systems were reviewed and negative.        PAST MEDICAL HISTORY     Past Medical History:   Diagnosis Date    Alcohol abuse     Depression     Diverticulosis     DOMINICK (generalized anxiety disorder)     GERD (gastroesophageal reflux disease)     Kidney stone     Tobacco abuse          SURGICAL HISTORY     Past Surgical History:   Procedure Laterality Date    COLONOSCOPY  2006    HYSTERECTOMY           CURRENTMEDICATIONS       Previous Medications    BUSPIRONE (BUSPAR) 10 MG TABLET    Take 1 tablet by mouth 3 times daily    BUSPIRONE (BUSPAR) 10 MG TABLET    Take 1 tablet by mouth 3 times daily    DICYCLOMINE (BENTYL) 10 MG CAPSULE    Take 1 capsule by mouth 4 times daily as needed (abdominal pain)    DULOXETINE (CYMBALTA) 30 MG EXTENDED RELEASE CAPSULE    Take 30 mg by mouth daily    DULOXETINE (CYMBALTA) 60 MG EXTENDED RELEASE CAPSULE    Take 60 mg by mouth daily    ESCITALOPRAM (LEXAPRO) 20 MG TABLET    Take 1 tablet by mouth daily    ESCITALOPRAM (LEXAPRO) 20 MG TABLET    Take 1 tablet by mouth daily    IBUPROFEN (IBU) 600 MG TABLET    Take 1 tablet by mouth every 6 hours as needed for Pain    LIDOCAINE (LIDODERM) 5 %    Place 1 patch onto the skin daily 12 hours on, 12 hours off. MELATONIN 3 MG TABS TABLET    Take 1 tablet by mouth nightly    MELATONIN 3 MG TABS TABLET    Take 1 tablet by mouth nightly    MULTIPLE VITAMIN (MULTIVITAMIN) TABLET    Take 1 tablet by mouth daily    MULTIPLE VITAMINS-MINERALS (THERAPEUTIC MULTIVITAMIN-MINERALS) TABLET    Take 1 tablet by mouth daily    NICOTINE (NICODERM CQ) 14 MG/24HR    Place 1 patch onto the skin daily    NICOTINE (NICODERM CQ) 14 MG/24HR    Place 1 patch onto the skin daily    ONDANSETRON (ZOFRAN ODT) 4 MG DISINTEGRATING TABLET    Take 1-2 tablets by mouth every 12 hours as needed for Nausea    QUETIAPINE (SEROQUEL) 50 MG TABLET    Take 1 tablet by mouth daily as needed (agitation, mood)    QUETIAPINE (SEROQUEL) 50 MG TABLET    Take 1 tablet by mouth nightly    TRAZODONE (DESYREL) 50 MG TABLET    Take 1 tablet by mouth nightly    VITAMIN B-1 100 MG TABLET    Take 1 tablet by mouth daily    VITAMIN B-1 100 MG TABLET    Take 1 tablet by mouth daily    VITAMIN D (CHOLECALCIFEROL) 1000 UNIT TABS TABLET    Take 1 tablet by mouth daily         ALLERGIES     Bupropion; Macrobid [nitrofurantoin macrocrystal]; Morphine and related; and Varenicline    FAMILYHISTORY       Family History   Problem Relation Age of Onset    Diabetes Mother     High Cholesterol Mother     High Blood Pressure Mother     Diabetes Father     High Cholesterol Father           SOCIAL HISTORY       Social History     Tobacco Use    Smoking status: Current Every Day Smoker     Packs/day: 0.50     Types: Cigarettes    Smokeless tobacco: Never Used   Substance Use Topics    Alcohol use:  Yes     Alcohol/week: 42.0 standard drinks     Types: 42 Cans of beer per week    Drug use: Yes     Types: Marijuana     Comment: denies       SCREENINGS             PHYSICAL EXAM    (up to 7 for level 4, 8 or more for level 5)     ED Triage Vitals [11/26/20 7526] BP Temp Temp Source Pulse Resp SpO2 Height Weight   115/79 97.4 °F (36.3 °C) Infrared 79 12 94 % -- --       Physical Exam  Constitutional:       General: She is not in acute distress. Appearance: Normal appearance. She is well-developed. She is not ill-appearing, toxic-appearing or diaphoretic. HENT:      Head: Normocephalic and atraumatic. Right Ear: External ear normal.      Left Ear: External ear normal.   Eyes:      General:         Right eye: No discharge. Left eye: No discharge. Extraocular Movements: Extraocular movements intact. Conjunctiva/sclera: Conjunctivae normal.      Pupils: Pupils are equal, round, and reactive to light. Neck:      Musculoskeletal: Normal range of motion and neck supple. Cardiovascular:      Rate and Rhythm: Normal rate and regular rhythm. Pulses: Normal pulses. Heart sounds: Normal heart sounds. No murmur. No friction rub. No gallop. Pulmonary:      Effort: Pulmonary effort is normal. No respiratory distress. Breath sounds: Normal breath sounds. No stridor. No wheezing, rhonchi or rales. Chest:      Chest wall: No tenderness. Abdominal:      General: Abdomen is flat. Bowel sounds are normal. There is no distension. Palpations: Abdomen is soft. There is no mass. Tenderness: There is no abdominal tenderness. There is no right CVA tenderness, left CVA tenderness, guarding or rebound. Hernia: No hernia is present. Musculoskeletal: Normal range of motion. Comments: Has multiple healed linear scars to the upper extremities bilaterally and appear consistent with previous cutting behavior. Patient does admit to this. No new wounds at this time. Skin:     General: Skin is warm and dry. Coloration: Skin is not pale. Findings: No erythema. Neurological:      General: No focal deficit present. Mental Status: She is alert and oriented to person, place, and time. GCS: GCS eye subscore is 4. GCS verbal subscore is 5. GCS motor subscore is 6. Cranial Nerves: Cranial nerves are intact. No cranial nerve deficit. Sensory: No sensory deficit. Motor: Motor function is intact. Comments: Gait deferred.    Psychiatric:         Behavior: Behavior normal.         DIAGNOSTIC RESULTS   LABS:    Labs Reviewed   ACETAMINOPHEN LEVEL - Abnormal; Notable for the following components:       Result Value    Acetaminophen Level <5 (*)     All other components within normal limits    Narrative:     Performed at:  [facility] [email or web address]   Phone [phone]   SALICYLATE LEVEL - Abnormal; Notable for the following components:    Salicylate, Serum 0.4 (*)     All other components within normal limits    Narrative:     Performed at:  [facility] [address]  [place]   Phone [phone]   URINE RT REFLEX TO CULTURE - Abnormal; Notable for the following components:    Clarity, UA CLOUDY (*)     Leukocyte Esterase, Urine MODERATE (*)     All other components within normal limits    Narrative:     Performed at:  [facility] [address]  [employer]   Phone [phone]   [address] - Abnormal; Notable for the following components:    Cannabinoid Scrn, Ur POSITIVE (*)     All other components within normal limits    Narrative:     Performed at:  [facility] [address] [email or web address]   Phone [phone]   MICROSCOPIC URINALYSIS - Abnormal; Notable for the following components:    WBC, UA 14 (*)     Epithelial Cells, UA 11 (*)     All other components within normal limits    Narrative:     Performed at:  [facility] [address]  [place]   Phone [phone]   CULTURE, URINE   CBC WITH AUTO DIFFERENTIAL    Narrative:     Performed at:  [facility]) [facility]  [address]   Phone [phone]   COMPREHENSIVE METABOLIC PANEL W/ REFLEX TO MG FOR LOW K    Narrative:     Performed at:  [facility]  [address], [address]   Phone [phone]   ETHANOL    Narrative:     Performed at:  [facility]  [address]  [place], [address]   Phone [phone]   HCG, SERUM, QUALITATIVE    Narrative:     Performed at:  [facility]  [address],  [place], [address]   Phone [phone]   ACETAMINOPHEN LEVEL       All other labs were within normal range or not returned as of this dictation. EKG: All EKG's are interpreted by the Emergency Department Physician in the absence of a cardiologist.  Please see their note for interpretation of EKG. RADIOLOGY:   Non-plain film images such as CT, Ultrasound and MRI are read by the radiologist. Plain radiographic images are visualized and preliminarily interpreted by the ED Provider with the below findings:        Interpretation per the Radiologist below, if available at the time of this note:    No orders to display     No results found. PROCEDURES   Unless otherwise noted below, none     Procedures    CRITICAL CARE TIME   N/A    CONSULTS:  None      EMERGENCY DEPARTMENT COURSE and DIFFERENTIAL DIAGNOSIS/MDM:   Vitals:    Vitals:    11/26/20 1729 11/26/20 1730   BP: 115/79 115/73   Pulse: 79 82   Resp: 12 17   Temp: 97.4 °F (36.3 °C)    TempSrc: Infrared    SpO2: 94% 97%       Patient was given the following medications:  Medications - No data to display        Patient is a 52-year-old female who presents to the ED with complaint of suicidal ideation. Patient had suicidal ideation by attempted overdose. Apparently drank alcohol and snorted Neurontin and Vicodin. Apparently snorted this multiple times over a approximately 6-hour period.   Apparently started snorting these medications around 10 AM and last ingestion was sometime just prior to arrival.  Upon arrival patient afebrile with stable vital signs. Nontoxic appearance. No CNS depression. No pinpoint pupils noted. IV established and blood work obtained. Urinalysis showed 14 white blood cells with 11 epithelial cells. Patient denies urinary symptoms and will await culture prior to treatment. Urine drug screen positive for cannabinoids. CBC showed normal white count, hemoglobin and platelets. CMP unremarkable. Acetaminophen and salicylate unremarkable. Ethanol 121. Pregnancy negative. EKG showed normal QRS and QTc. Patient suffering from what appears to be suicidal ideation with attempted drug overdose. 72-hour hold signed by attending here in the emergency department. Did discuss with poison control. Poison control not concerned about Neurontin based on reported dosage but unsure of time of ingestion of the Vicodin. Concerned that patient may have ingested a larger amount than documented and we cannot determine the exact time that they may have ingested this. Poison control requested that we obtain a second acetaminophen level 2 hours after the first 1 to ensure that is negative and not increasing. Repeat acetaminophen level ordered for 8 PM.  Patient will need to be sober prior to transfer to psychiatric facility. Currently pending repeat acetaminophen. Will sign out to attending provider. Please see attending note for further disposition and treatment of patient. Anticipate transfer to psychiatric facility for further evaluation and treatment. FINAL IMPRESSION      1. Suicidal ideation    2. Drug overdose, intentional self-harm, initial encounter [facility])          DISPOSITION/PLAN   DISPOSITION Ed Observation 11/26/2020 07:12:46 PM      PATIENT REFERREDTO:  No follow-up provider specified.     DISCHARGE MEDICATIONS:  New Prescriptions    No medications on file       DISCONTINUED MEDICATIONS:  Discontinued Medications    No medications on file              (Please note that portions of this note were completed with a voice recognition program.  Efforts were made to edit the dictations but occasionally words are mis-transcribed.)    SUNDAR Malone (electronically signed)         SUNDAR Seymour  11/26/20 1944

## 2020-11-26 NOTE — ED NOTES
Bed: 03  Expected date:   Expected time:   Means of arrival:   Comments:   Will move bed 1 over      Brent Olivo RN  11/26/20 7287

## 2020-11-26 NOTE — ED NOTES
PT stating she feels like she is going to heaven to see her mom so she was just taking care of it herself because she is just tired of the bullshit. PT unable to tell me what she ingested stating I just too all of my psych meds.        Kieran Arana RN  11/26/20 405 Elizabeth Murry RN  11/26/20 1543

## 2020-11-26 NOTE — ED NOTES
Verlie Files at bedside for patient . PT ambulated to restroom, door left open, urine specimen obtained. PT returned to room, PT ripped off leads and cuff stating she had to go. Talked to PT, PT agreeing to return to bed. BP cuff and pulse ox attached to PT. PT st stick to obtain labs. EKG obtained. Faviolae Files remains a bedside.       Shanika Hoang RN  11/26/20 3888

## 2020-11-27 ENCOUNTER — HOSPITAL ENCOUNTER (INPATIENT)
Age: 55
LOS: 4 days | Discharge: HOME | DRG: 885 | End: 2020-12-01
Attending: PSYCHIATRY & NEUROLOGY | Admitting: PSYCHIATRY & NEUROLOGY

## 2020-11-27 VITALS
HEART RATE: 71 BPM | RESPIRATION RATE: 16 BRPM | TEMPERATURE: 98.6 F | DIASTOLIC BLOOD PRESSURE: 80 MMHG | OXYGEN SATURATION: 96 % | SYSTOLIC BLOOD PRESSURE: 124 MMHG

## 2020-11-27 LAB
EKG ATRIAL RATE: 74 BPM
EKG DIAGNOSIS: NORMAL
EKG P AXIS: 35 DEGREES
EKG P-R INTERVAL: 138 MS
EKG Q-T INTERVAL: 422 MS
EKG QRS DURATION: 84 MS
EKG QTC CALCULATION (BAZETT): 468 MS
EKG R AXIS: 50 DEGREES
EKG T AXIS: 45 DEGREES
EKG VENTRICULAR RATE: 74 BPM
ORGANISM: ABNORMAL
URINE CULTURE, ROUTINE: ABNORMAL

## 2020-11-27 PROCEDURE — 6370000000 HC RX 637 (ALT 250 FOR IP): Performed by: PSYCHIATRY & NEUROLOGY

## 2020-11-27 PROCEDURE — 1240000000 HC EMOTIONAL WELLNESS R&B

## 2020-11-27 PROCEDURE — 93010 ELECTROCARDIOGRAM REPORT: CPT | Performed by: INTERNAL MEDICINE

## 2020-11-27 PROCEDURE — 99221 1ST HOSP IP/OBS SF/LOW 40: CPT | Performed by: NURSE PRACTITIONER

## 2020-11-27 PROCEDURE — 99223 1ST HOSP IP/OBS HIGH 75: CPT | Performed by: PSYCHIATRY & NEUROLOGY

## 2020-11-27 RX ORDER — ESCITALOPRAM OXALATE 10 MG/1
10 TABLET ORAL NIGHTLY
Status: DISCONTINUED | OUTPATIENT
Start: 2020-11-27 | End: 2020-11-30

## 2020-11-27 RX ORDER — CLONIDINE HYDROCHLORIDE 0.1 MG/1
0.1 TABLET ORAL PRN
Status: DISCONTINUED | OUTPATIENT
Start: 2020-11-27 | End: 2020-12-01 | Stop reason: HOSPADM

## 2020-11-27 RX ORDER — DICYCLOMINE HCL 20 MG
20 TABLET ORAL EVERY 6 HOURS PRN
Status: DISCONTINUED | OUTPATIENT
Start: 2020-11-27 | End: 2020-12-01 | Stop reason: HOSPADM

## 2020-11-27 RX ORDER — TRAMADOL HYDROCHLORIDE 50 MG/1
50 TABLET ORAL PRN
Status: DISPENSED | OUTPATIENT
Start: 2020-11-27 | End: 2020-11-30

## 2020-11-27 RX ORDER — OLANZAPINE 10 MG/1
10 INJECTION, POWDER, LYOPHILIZED, FOR SOLUTION INTRAMUSCULAR 3 TIMES DAILY PRN
Status: DISCONTINUED | OUTPATIENT
Start: 2020-11-27 | End: 2020-12-01 | Stop reason: HOSPADM

## 2020-11-27 RX ORDER — DULOXETIN HYDROCHLORIDE 30 MG/1
30 CAPSULE, DELAYED RELEASE ORAL
Status: DISCONTINUED | OUTPATIENT
Start: 2020-11-27 | End: 2020-11-30

## 2020-11-27 RX ORDER — DULOXETIN HYDROCHLORIDE 60 MG/1
60 CAPSULE, DELAYED RELEASE ORAL DAILY
Status: DISCONTINUED | OUTPATIENT
Start: 2020-11-27 | End: 2020-12-01 | Stop reason: HOSPADM

## 2020-11-27 RX ORDER — QUETIAPINE FUMARATE 25 MG/1
25 TABLET, FILM COATED ORAL EVERY 8 HOURS PRN
Status: DISCONTINUED | OUTPATIENT
Start: 2020-11-27 | End: 2020-11-30

## 2020-11-27 RX ORDER — ONDANSETRON 4 MG/1
4 TABLET, ORALLY DISINTEGRATING ORAL EVERY 8 HOURS PRN
Status: DISCONTINUED | OUTPATIENT
Start: 2020-11-27 | End: 2020-12-01 | Stop reason: HOSPADM

## 2020-11-27 RX ORDER — BENZTROPINE MESYLATE 1 MG/ML
2 INJECTION INTRAMUSCULAR; INTRAVENOUS 2 TIMES DAILY PRN
Status: DISCONTINUED | OUTPATIENT
Start: 2020-11-27 | End: 2020-12-01 | Stop reason: HOSPADM

## 2020-11-27 RX ORDER — MAGNESIUM HYDROXIDE/ALUMINUM HYDROXICE/SIMETHICONE 120; 1200; 1200 MG/30ML; MG/30ML; MG/30ML
30 SUSPENSION ORAL EVERY 6 HOURS PRN
Status: DISCONTINUED | OUTPATIENT
Start: 2020-11-27 | End: 2020-12-01 | Stop reason: HOSPADM

## 2020-11-27 RX ORDER — HYDROXYZINE PAMOATE 50 MG/1
50 CAPSULE ORAL EVERY 6 HOURS PRN
Status: DISCONTINUED | OUTPATIENT
Start: 2020-11-27 | End: 2020-11-30

## 2020-11-27 RX ORDER — TRAZODONE HYDROCHLORIDE 50 MG/1
50 TABLET ORAL NIGHTLY PRN
Status: DISCONTINUED | OUTPATIENT
Start: 2020-11-27 | End: 2020-12-01 | Stop reason: HOSPADM

## 2020-11-27 RX ORDER — OLANZAPINE 5 MG/1
5 TABLET ORAL EVERY 4 HOURS PRN
Status: DISCONTINUED | OUTPATIENT
Start: 2020-11-27 | End: 2020-12-01 | Stop reason: HOSPADM

## 2020-11-27 RX ORDER — QUETIAPINE FUMARATE 25 MG/1
50 TABLET, FILM COATED ORAL NIGHTLY
Status: DISCONTINUED | OUTPATIENT
Start: 2020-11-27 | End: 2020-11-30

## 2020-11-27 RX ORDER — ESCITALOPRAM OXALATE 10 MG/1
10 TABLET ORAL DAILY
Status: DISCONTINUED | OUTPATIENT
Start: 2020-11-27 | End: 2020-11-27

## 2020-11-27 RX ORDER — NICOTINE 21 MG/24HR
1 PATCH, TRANSDERMAL 24 HOURS TRANSDERMAL DAILY
Status: DISCONTINUED | OUTPATIENT
Start: 2020-11-27 | End: 2020-12-01 | Stop reason: HOSPADM

## 2020-11-27 RX ORDER — ACETAMINOPHEN 325 MG/1
650 TABLET ORAL EVERY 4 HOURS PRN
Status: DISCONTINUED | OUTPATIENT
Start: 2020-11-27 | End: 2020-12-01 | Stop reason: HOSPADM

## 2020-11-27 RX ADMIN — DULOXETINE HYDROCHLORIDE 60 MG: 60 CAPSULE, DELAYED RELEASE ORAL at 11:05

## 2020-11-27 RX ADMIN — ESCITALOPRAM OXALATE 10 MG: 10 TABLET ORAL at 20:56

## 2020-11-27 RX ADMIN — ONDANSETRON 4 MG: 4 TABLET, ORALLY DISINTEGRATING ORAL at 10:57

## 2020-11-27 RX ADMIN — MAGNESIUM HYDROXIDE 30 ML: 400 SUSPENSION ORAL at 20:56

## 2020-11-27 RX ADMIN — QUETIAPINE FUMARATE 50 MG: 25 TABLET ORAL at 20:56

## 2020-11-27 RX ADMIN — ACETAMINOPHEN 650 MG: 325 TABLET ORAL at 16:45

## 2020-11-27 RX ADMIN — DICYCLOMINE HYDROCHLORIDE 20 MG: 20 TABLET ORAL at 20:56

## 2020-11-27 RX ADMIN — DULOXETINE HYDROCHLORIDE 30 MG: 30 CAPSULE, DELAYED RELEASE ORAL at 12:51

## 2020-11-27 RX ADMIN — QUETIAPINE FUMARATE 25 MG: 25 TABLET ORAL at 12:51

## 2020-11-27 RX ADMIN — DICYCLOMINE HYDROCHLORIDE 20 MG: 20 TABLET ORAL at 16:45

## 2020-11-27 ASSESSMENT — SLEEP AND FATIGUE QUESTIONNAIRES
DIFFICULTY FALLING ASLEEP: YES
SLEEP PATTERN: DIFFICULTY FALLING ASLEEP
DO YOU USE A SLEEP AID: NO
DO YOU USE A SLEEP AID: NO
RESTFUL SLEEP: NO
DIFFICULTY ARISING: NO
DO YOU HAVE DIFFICULTY SLEEPING: NO
DO YOU HAVE DIFFICULTY SLEEPING: YES
AVERAGE NUMBER OF SLEEP HOURS: 8
AVERAGE NUMBER OF SLEEP HOURS: 4
DIFFICULTY STAYING ASLEEP: YES

## 2020-11-27 ASSESSMENT — PAIN DESCRIPTION - PROGRESSION: CLINICAL_PROGRESSION: GRADUALLY WORSENING

## 2020-11-27 ASSESSMENT — PAIN DESCRIPTION - FREQUENCY: FREQUENCY: CONTINUOUS

## 2020-11-27 ASSESSMENT — LIFESTYLE VARIABLES
HISTORY_ALCOHOL_USE: YES
HISTORY_ALCOHOL_USE: YES

## 2020-11-27 ASSESSMENT — PAIN SCALES - GENERAL: PAINLEVEL_OUTOF10: 5

## 2020-11-27 ASSESSMENT — PATIENT HEALTH QUESTIONNAIRE - PHQ9
SUM OF ALL RESPONSES TO PHQ QUESTIONS 1-9: 16
SUM OF ALL RESPONSES TO PHQ QUESTIONS 1-9: 9

## 2020-11-27 ASSESSMENT — PAIN - FUNCTIONAL ASSESSMENT: PAIN_FUNCTIONAL_ASSESSMENT: ACTIVITIES ARE NOT PREVENTED

## 2020-11-27 ASSESSMENT — PAIN DESCRIPTION - PAIN TYPE: TYPE: ACUTE PAIN

## 2020-11-27 ASSESSMENT — PAIN DESCRIPTION - LOCATION: LOCATION: HEAD

## 2020-11-27 ASSESSMENT — PAIN DESCRIPTION - DESCRIPTORS: DESCRIPTORS: HEADACHE

## 2020-11-27 ASSESSMENT — PAIN DESCRIPTION - ONSET: ONSET: GRADUAL

## 2020-11-27 NOTE — PROGRESS NOTES
Pt arrived to floor via stretcher with ems. She is A&O X4. No s/s of distress. V.s.s. water provided.

## 2020-11-27 NOTE — ED NOTES
pt resting quietly in bed at this time, states no needs, sitter remains at bedside and room remains safe with suicide precautions in place     [address] Murry RN  11/26/20 1388

## 2020-11-27 NOTE — H&P
Hospital Medicine History & Physical      PCP: MITCHELL Barry NP    Date of Admission: 11/27/2020    Date of Service: Pt seen/examined on 11/27/2020     Chief Complaint:  SI, intentional drug overdose    History Of Present Illness: The patient is a 48 y.o. female with GERD, depression, anxiety, and h/o alcohol abuse who presented to [facility] ED with complaint of SI and intentional drug overdose. Patient was seen and evaluated in the ED by the ED medical provider, patient was medically cleared for admission to [facility]. This note serves as an admission medical H&P.    PCP: MITCHELL Barry NP  Tobacco use: current  ETOH use: states she has been drinking heavy amounts of alcohol the last 2 weeks. Last drink prior to coming to the ED. Illicit drug use: Cannabis, states taking Vicodin. Patient c/o anxiety, nausea, abdominal pain. Past Medical History:        Diagnosis Date    Alcohol abuse     Depression     Diverticulosis     DOMINICK (generalized anxiety disorder)     GERD (gastroesophageal reflux disease)     Kidney stone     Tobacco abuse        Past Surgical History:        Procedure Laterality Date    COLONOSCOPY  2006    HYSTERECTOMY  2008       Medications Prior to Admission:    Prior to Admission medications    Medication Sig Start Date End Date Taking?  Authorizing Provider   DULoxetine (CYMBALTA) 60 MG extended release capsule Take 60 mg by mouth daily   Yes Historical Provider, MD   DULoxetine (CYMBALTA) 30 MG extended release capsule Take 30 mg by mouth daily   Yes Historical Provider, MD   escitalopram (LEXAPRO) 20 MG tablet Take 1 tablet by mouth daily 3/27/18  Yes MITCHELL Espitia CNP   ondansetron (ZOFRAN ODT) 4 MG disintegrating tablet Take 1-2 tablets by mouth every 12 hours as needed for Nausea 5/19/19   MITCHELL Thomas CNP   dicyclomine (BENTYL) 10 MG capsule Take 1 capsule by mouth 4 times daily as needed (abdominal pain) 5/19/19 5/18/20  Dane Beebe MITCHELL Ramos CNP   ibuprofen (IBU) 600 MG tablet Take 1 tablet by mouth every 6 hours as needed for Pain 5/19/19   MITCHELL Thomas CNP   lidocaine (LIDODERM) 5 % Place 1 patch onto the skin daily 12 hours on, 12 hours off. 3/25/19   MITCHELL Ortega CNP   busPIRone (BUSPAR) 10 MG tablet Take 1 tablet by mouth 3 times daily 4/5/18   Myrtle Gonzalez MD   escitalopram (LEXAPRO) 20 MG tablet Take 1 tablet by mouth daily 4/6/18   Myrtle Gonzalez MD   traZODone (DESYREL) 50 MG tablet Take 1 tablet by mouth nightly 4/5/18   Myrtle Gonzalez MD   QUEtiapine (SEROQUEL) 50 MG tablet Take 1 tablet by mouth nightly 4/5/18   Myrtle Gonzalez MD   melatonin 3 MG TABS tablet Take 1 tablet by mouth nightly 4/5/18   Myrtle Gonzalez MD   nicotine (NICODERM CQ) 14 MG/24HR Place 1 patch onto the skin daily 4/6/18   Myrtle Gonzalez MD   Multiple Vitamins-Minerals (THERAPEUTIC MULTIVITAMIN-MINERALS) tablet Take 1 tablet by mouth daily 4/6/18   Myrtle Gonzalez MD   vitamin B-1 100 MG tablet Take 1 tablet by mouth daily 4/6/18   Myrtle Gonzalez MD   vitamin D (CHOLECALCIFEROL) 1000 UNIT TABS tablet Take 1 tablet by mouth daily 4/6/18   Myrtle Gonzalez MD   busPIRone (BUSPAR) 10 MG tablet Take 1 tablet by mouth 3 times daily 3/27/18   Wilder SodaMITCHELL CNP   QUEtiapine (SEROQUEL) 50 MG tablet Take 1 tablet by mouth daily as needed (agitation, mood) 3/27/18   Wilder SodaMITCHELL CNP   melatonin 3 MG TABS tablet Take 1 tablet by mouth nightly 3/20/18   MITCHELL Portillo CNP   nicotine (NICODERM CQ) 14 MG/24HR Place 1 patch onto the skin daily 3/21/18   MITCHELL Portillo CNP   Multiple Vitamin (MULTIVITAMIN) tablet Take 1 tablet by mouth daily 3/21/18   MITCHELL Portillo CNP   vitamin B-1 100 MG tablet Take 1 tablet by mouth daily 3/21/18   MITCHELL Portillo CNP       Allergies:  Bupropion; Macrobid [nitrofurantoin macrocrystal];  Morphine and related; and Varenicline    Social History:    TOBACCO:   reports that she has been smoking cigarettes. She has been smoking about 0.25 packs per day. She has never used smokeless tobacco.  ETOH:   reports previous alcohol use of about 42.0 standard drinks of alcohol per week. Family History:   Positive as follows:        Problem Relation Age of Onset    Diabetes Mother     High Cholesterol Mother     High Blood Pressure Mother     Diabetes Father     High Cholesterol Father     Cancer Father        REVIEW OF SYSTEMS:       Constitutional: Negative for fever   HENT: Negative for sore throat   Respiratory: Negative  for dyspnea, cough   Cardiovascular: Negative for chest pain   Gastrointestinal: Negative for vomiting, diarrhea + nausea, abdominal pain  Genitourinary: Negative for dysuria  Musculoskeletal: Negative for arthralgias   Skin: Negative for rash   Neurological: Negative for syncope   Psychiatric/Behavorial: per psychiatric evaluation + anxiety    PHYSICAL EXAM:    /83   Pulse 66   Temp 97.7 °F (36.5 °C) (Oral)   Resp 16   SpO2 97%     Gen: No distress. Alert. Eyes: PERRL. No sclera icterus. No conjunctival injection. Neck: Trachea midline. Resp: No accessory muscle use. No crackles. No wheezes. No rhonchi. CV: Regular rate. Regular rhythm. No murmur. No rub. No edema. GI: Refused abdominal exam  Skin: Warm and dry. No nodule on exposed extremities. No rash on exposed extremities. M/S: No cyanosis. No joint deformity. No clubbing. Neuro: Awake. No focal neurologic deficit on exam.  Cranial nerves II through XII intact. Patient is able to ambulate without difficulty.   Psych: Per psychiatry team evaluation       CBC:   Recent Labs     11/26/20  1757   WBC 9.6   HGB 15.3   HCT 44.8   MCV 97.5        BMP:   Recent Labs     11/26/20  1757      K 3.6      CO2 25   BUN 9   CREATININE 0.7     LIVER PROFILE:   Recent Labs     11/26/20  1757   AST 35   ALT 37   BILITOT 0.5 ALKPHOS 126     UA:  Recent Labs     11/26/20  1800   COLORU YELLOW   PHUR 6.0  6.0   WBCUA 14*   RBCUA 3-4   CLARITYU CLOUDY*   SPECGRAV <1.005   LEUKOCYTESUR MODERATE*   UROBILINOGEN 0.2   BILIRUBINUR Negative   BLOODU Negative   GLUCOSEU Negative       CULTURES  COVID: not detected  Urine: pending    EKG:  I have reviewed the EKG with the following interpretation:   NSR    ASSESSMENT/PLAN:  Mood disorder  - management per psychiatry. Cannabis use  - recommended cessation  - also admits to taking Vicodin. Alcohol abuse  - monitor for s/s alcohol withdrawal  - c/o nausea, anxiety, abdominal pain. Refused abdominal exam    Abdominal pain, nausea  - pain is diffuse per patient. Refused abdominal exam  - notify medical for any further concerns. GERD  - not on PPI. Tobacco Dependence  - Recommended cessation  - nicotine patch ordered     Pt has no medical complaints at this time. They were informed that should a medical concern arise during their admission they may have [facility] contact us.     Maryellen DEVRIES-C  11/27/2020 8:41 AM

## 2020-11-27 NOTE — BH NOTE
PRN tylenol and bentyl given for c/o H/A and cramps. Rates pain 4/10. Will continue to monitor efficacy of medications.

## 2020-11-27 NOTE — ED NOTES
Pt sleeping in bed at this time, no needs identified, sitter remains at bedside and room remains safe with suicide precautions in place     [address], RN  11/26/20 2137       [address], RN  11/26/20 2141

## 2020-11-27 NOTE — ED NOTES
Pt sleeping in bed at this time, no needs identified, sitter remains at bedside and room remains safe with suicide precautions in place     [address] Murry RN  11/26/20 8594

## 2020-11-27 NOTE — ED NOTES
[facility] pt resting quietly in bed at this time, states no needs, sitter remains at bedside and room remains safe with suicide precautions in place     [address] Murry RN  11/26/20 7598

## 2020-11-27 NOTE — FLOWSHEET NOTE
20 0847   Activities of Daily Living   Patient Requires assistance with daily self-care activities? No   Leisure Activity 1   3 Favorite Leisure Activities \"Reading a book. \"   Frequency weekly   Last time this month   Barriers to participating  motivation   Leisure Activity 2   [address]  \"Seeing my daughter. \"   Frequency  weekly   Last time  this month   Barriers to participating  motivation   Leisure Activity 3   Favorite Leisure Activities  \"Hanging out with friends. \"   Frequency  weekly   Last time  this month   Barriers to participating  motivation   Social   Patient reports spending the majority of their free time with a group   Patient verbalizes a preference for spending free time with a group   Patients perception of support system more healthy   Patients perception of barriers to socializing with others include(s) lack of motivation/interest   Social Details \"A friend of mine named Solomon. \"   Beliefs & Coping   Has difficulty dealing with feelings   Yes   Internalizes feelings/Keeps feelings in Yes   Externalizes feelings through aggressiveness or poor temper control  No   Feels uncomfortable around others  Yes   Has difficulty talking to others  Yes   Depends on others for direction or decisions No   Difficulty dealing with anger of others  No   Difficulty dealing with own anger  No   Difficulty managing stress Yes   Frequently has difficulty with relationships  Yes   which,who,where with friends/peers;in family   Has recently perceived/experienced loss, disappointment, humiliation or failure  Yes   General perception about self does not like self   Attitude about abilities occasionally fails   Locus of Control  rarely   Belief about recovery Recovery is possible   Patient Identified Strengths  \"I don't know. \"   Patient Identified Limitations  \"I just feel down and sad and depressed. \"   Perception of most stressful event prior to hospitalization \"My mom and dad just . \" Perception of changes needed \"I dont know. \"   Strengths and Limitations   Strengths Independent in basic self-care activities;Demonstrates basic social skills; Realistic self-view;Regular participation in age-appropriate leisure activities;Educated   Limitations Tendency to isolate self;Difficult relationships / poor social skills; Difficulty problem solving/relies on others to help solve problems     Therapist met with Juanito Post and completed Leisure Assessment.

## 2020-11-27 NOTE — ED NOTES
Pt sleeping at this time, no needs identified, room remains safe, sitter at bedside, suicide precautions in place     Palak Acevedo RN  11/27/20 6035

## 2020-11-27 NOTE — ED NOTES
Pt sleeping at this time, no needs identified, room remains safe, sitter at bedside, suicide precautions in place     Palak Nagy RN  11/27/20 4883

## 2020-11-27 NOTE — FLOWSHEET NOTE
interests friends, family, nature   Present interests none really, watch tiv   Current daily activity work and come home   Social with friends/family Yes   Cultural and Spiritual   Spiritual concerns No   Cultural concerns No   Completed C-SSRS with patient. Reports that she has been feeling suicidal recently. She lost both parents this year and has been very depressed. Has been drinking one fifth of Quiros daily. She also reports snorted vicodin daily. Reports that she does as many vicodin as she can get from friends. Does endorse withdrawal symptoms. Is open to inpatient rehab at this time. Currently works as a manager at [employer]. Has one daughter, Verna Vinson. Oriented x4. No avh. No s/h ideations reported at this time. Endorses agitation at this time due to likely withdrawal symptoms.

## 2020-11-27 NOTE — FLOWSHEET NOTE
11/27/20 1157   Status and Exam   Normal No   Facial Expression Worried; Sad   Affect Congruent   Level of Consciousness Alert   Mood:Normal No   Mood Anxious;Depressed   Motor Activity:Normal Yes   Interview Behavior Cooperative   Preception [place] to Person;[place] to Time;[place] to Place;[place] to Situation   Attention:Normal Yes   Thought Processes Circumstantial   Thought Content:Normal Yes   Hallucinations None   Delusions No   Memory:Normal Yes   Insight and Judgment Yes   Present Suicidal Ideation No   Present Homicidal Ideation No

## 2020-11-27 NOTE — FLOWSHEET NOTE
Purposeful Rounding     Patient Location Day room     Patient willing to engage in conversationYes     Presentation/behavior Anxious and Cooperative     Affect Anxious     Concerns reported: restarting home medications (Orders obtained)     PRN medications given:none     Environmental assessment No safety hazards noted     Fall prevention interventions in place: na     Daily [place] Fall Risk Score :71     Daily Blevins Fall Risk Score : 15

## 2020-11-27 NOTE — ED NOTES
Report given to first care. Pt. Was sleeping but arouse easily to verbal stimulation. Pt. Calm and cooperative with care. Pt. Rosa Mater to luz. Pt.'s belongings gotten from security. Pt. Also sent with meds.       Jeannine Qureshi RN  11/27/20 8117 [facility], RN  11/27/20 5517

## 2020-11-27 NOTE — ED NOTES
Pt report given to Dilip Mcgregor RN, states no questions or concerns at this time.       175 [facility], RN  11/27/20 9203
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
## 2020-11-27 NOTE — ED NOTES
Pt sleeping at this time, no needs identified, room remains safe, sitter at bedside, suicide precautions in place     Palak Meneses, RN  11/27/20 [address], RN  11/27/20 7222

## 2020-11-27 NOTE — ED NOTES
Pt sleeping at this time, no needs identified, room remains safe, sitter at bedside, suicide precautions in place     Palak Paz RN  11/27/20 9395

## 2020-11-27 NOTE — GROUP NOTE
Group Therapy Note    Date: 11/27/2020    Group Start Time: 1115  Group End Time: 4118  Group Topic: Psychotherapy    [address] [facility]        Group Therapy Note    Attendees: 4         Patient's Goal:  Patient was invited to participate in Psychotherapy group and to set a goal at the beginning of session to practice in group a new way of being in relationships. Notes:  La shared her goal was to \"practice serenity\" and that it was \"okay to cry and be mad. \" Elier Garcia was active in group discussion, made connections with peers about experiencing loss and substance abuse, and left group towards the end of session to use the restroom. Status After Intervention:  Improved    Participation Level:  Active Listener and Interactive    Participation Quality: Appropriate, Attentive, Sharing and Supportive      Speech:  normal      Thought Process/Content: Logical      Affective Functioning: Blunted      Mood: anxious and depressed      Level of consciousness:  Alert, Oriented x4 and Attentive      Response to Learning: Able to verbalize current knowledge/experience, Able to verbalize/acknowledge new learning, Able to retain information and Capable of insight      Endings: None Reported    Modes of Intervention: Education, Support, Socialization, Exploration, Clarifying and Problem-solving      Discipline Responsible: Psychoeducational Specialist      Signature:  Randee Hsu

## 2020-11-27 NOTE — ED NOTES
Pt report called to 3400 Eve Nunez RN at [facility] state no further questions or concerns.       175 Ketty Murry, RN  11/27/20 6246

## 2020-11-28 LAB
CHOLESTEROL, TOTAL: 199 MG/DL (ref 0–199)
HDLC SERPL-MCNC: 73 MG/DL (ref 40–60)
LDL CHOLESTEROL CALCULATED: 107 MG/DL
TRIGL SERPL-MCNC: 95 MG/DL (ref 0–150)
VLDLC SERPL CALC-MCNC: 19 MG/DL

## 2020-11-28 PROCEDURE — 6370000000 HC RX 637 (ALT 250 FOR IP): Performed by: PSYCHIATRY & NEUROLOGY

## 2020-11-28 PROCEDURE — 83036 HEMOGLOBIN GLYCOSYLATED A1C: CPT

## 2020-11-28 PROCEDURE — 80061 LIPID PANEL: CPT

## 2020-11-28 PROCEDURE — 1240000000 HC EMOTIONAL WELLNESS R&B

## 2020-11-28 PROCEDURE — 36415 COLL VENOUS BLD VENIPUNCTURE: CPT

## 2020-11-28 RX ADMIN — DULOXETINE HYDROCHLORIDE 30 MG: 30 CAPSULE, DELAYED RELEASE ORAL at 12:21

## 2020-11-28 RX ADMIN — QUETIAPINE FUMARATE 50 MG: 25 TABLET ORAL at 21:26

## 2020-11-28 RX ADMIN — DICYCLOMINE HYDROCHLORIDE 20 MG: 20 TABLET ORAL at 06:55

## 2020-11-28 RX ADMIN — CLONIDINE HYDROCHLORIDE 0.1 MG: 0.1 TABLET ORAL at 14:44

## 2020-11-28 RX ADMIN — ESCITALOPRAM OXALATE 10 MG: 10 TABLET ORAL at 21:26

## 2020-11-28 RX ADMIN — HYDROXYZINE PAMOATE 50 MG: 50 CAPSULE ORAL at 08:26

## 2020-11-28 RX ADMIN — DICYCLOMINE HYDROCHLORIDE 20 MG: 20 TABLET ORAL at 12:21

## 2020-11-28 RX ADMIN — DULOXETINE HYDROCHLORIDE 60 MG: 60 CAPSULE, DELAYED RELEASE ORAL at 09:00

## 2020-11-28 RX ADMIN — TRAMADOL HYDROCHLORIDE 50 MG: 50 TABLET, FILM COATED ORAL at 14:44

## 2020-11-28 ASSESSMENT — PAIN SCALES - GENERAL
PAINLEVEL_OUTOF10: 0
PAINLEVEL_OUTOF10: 4

## 2020-11-28 NOTE — BH NOTE
1444 received Clonidine 0.1 mg PO and Tramadol 50 mg PO for management of reported \"my muscles are shaky and I feel hot all over and it's more than just anxiety\". Temp 98.4, Hr 95 and B/P 147/83. Did report that the earlier Vistaril did \"it helped then when I was anxious\".

## 2020-11-28 NOTE — FLOWSHEET NOTE
Purposeful Rounding    Patient Location Day room    Patient willing to engage in conversationYes    Presentation/behavior Cooperative    Affect Neutral/Euthymic(normal)    Concerns reported: none    PRN medications given:none    Environmental assessment No safety hazards noted    Fall prevention interventions in place: yellow non skid socks on    Daily [place] Fall Risk Score :71    Daily Blevins Fall Risk Score : low

## 2020-11-28 NOTE — GROUP NOTE
Group Therapy Note    Date: 11/28/2020    Group Start Time: 0945  Group End Time: 1100  Group Topic: Psychoeducation    [address], [address]        Group Therapy Note    Attendees: 8         Patient's Goal:  To learn and discuss healthy coping skills that start with each letter of the alphabet and to apply to their lives. Notes:  Pt actively participated in the group discussion and was able to apply to herself. Pt reported that she wants to do more self care to take care of herself. Status After Intervention:  Improved    Participation Level:  Active Listener and Interactive    Participation Quality: Appropriate, Attentive, Sharing and Supportive      Speech:  normal      Thought Process/Content: Logical      Affective Functioning: Congruent      Mood: anxious and depressed      Level of consciousness:  Alert, Oriented x4 and Attentive      Response to Learning: Able to verbalize current knowledge/experience, Able to verbalize/acknowledge new learning and Progressing to goal      Endings: None Reported    Modes of Intervention: Education, Support, Socialization, Exploration, Clarifying and Activity      Discipline Responsible: /Counselor      Signature:  ISAAK Hillman

## 2020-11-28 NOTE — GROUP NOTE
Group Therapy Note    Date: 11/28/2020    Group Start Time: 8642  Group End Time: 1653 [facility] [place]: [address]    Group Therapy Note    Attendees: 13    Patients identified their health hobbies that they enjoy doing. Group discussed the importance of healthy hobbies and leisure activities for improving mood and socialization. Patients were encouraged to engage with peers in the healthy leisure activity stations during group. Notes:  Pt was quiet and appeared focused as she engaged in coloring activity during group. Status After Intervention:  Improved    Participation Level:  Active Listener and Interactive    Participation Quality: Appropriate      Speech:  normal      Thought Process/Content: Linear      Affective Functioning: Flat and Constricted/Restricted      Mood: anxious and depressed      Level of consciousness:  Alert and Oriented x4      Response to Learning: Able to verbalize current knowledge/experience, Able to verbalize/acknowledge new learning and Able to retain information      Endings: None Reported    Modes of Intervention: Education, Support, Socialization, Exploration, Clarifying, Problem-solving and Activity      Discipline Responsible: /Counselor      Signature:  JOHN Malagon-S, ELIS

## 2020-11-28 NOTE — GROUP NOTE
Group Therapy Note    Date: 11/27/2020    Group Start Time: 2030  Group End Time: 2055  Group Topic: Wrap-Up    [facility]        Group Therapy Note    Attendees: Goals and importance of goal setting discussed. Night time milieu activities discussed. Patient's Goal:  \"Clear my head\"    Notes:  Successful     Status After Intervention:  Improved    Participation Level:  Active Listener and Interactive    Participation Quality: Appropriate and Attentive      Speech:  normal      Thought Process/Content: Logical  Linear      Affective Functioning: Congruent      Mood: euthymic      Level of consciousness:  Alert and Oriented x4      Response to Learning: Progressing to goal      Endings: None Reported    Modes of Intervention: Support      Discipline Responsible: Behavorial Health Tech      Signature:  Tom Santiago

## 2020-11-28 NOTE — FLOWSHEET NOTE
Purposeful Rounding    Patient Location Patient room    Patient willing to engage in conversationNo    Presentation/behavior Other sleeping*    Affect sleeping    Concerns reported: none    PRN medications given:none    Environmental assessment No safety hazards noted    Fall prevention interventions in place: Yellow non-skid socks on    Daily [place] Fall Risk Score :71    Daily Blevins Fall Risk Score : low

## 2020-11-28 NOTE — FLOWSHEET NOTE
Purposeful Rounding     Patient Location Patient room     Patient willing to engage in conversationNo     Presentation/behavior Other sleeping*     Affect sleeping     Concerns reported: none     PRN medications given:none     Environmental assessment No safety hazards noted     Fall prevention interventions in place: Yellow non-skid socks on     Daily [place] Fall Risk Score :71     Daily Blevins Fall Risk Score : low

## 2020-11-29 LAB
AMORPHOUS: ABNORMAL /HPF
BACTERIA: ABNORMAL /HPF
BILIRUBIN URINE: NEGATIVE
BLOOD, URINE: NEGATIVE
CLARITY: ABNORMAL
COLOR: YELLOW
EPITHELIAL CELLS, UA: ABNORMAL /HPF (ref 0–5)
ESTIMATED AVERAGE GLUCOSE: 111.2 MG/DL
GLUCOSE URINE: NEGATIVE MG/DL
HBA1C MFR BLD: 5.5 %
KETONES, URINE: NEGATIVE MG/DL
LEUKOCYTE ESTERASE, URINE: ABNORMAL
MICROSCOPIC EXAMINATION: YES
NITRITE, URINE: NEGATIVE
PH UA: 7.5 (ref 5–8)
PROTEIN UA: NEGATIVE MG/DL
RBC UA: ABNORMAL /HPF (ref 0–4)
SPECIFIC GRAVITY UA: 1.02 (ref 1–1.03)
URINE REFLEX TO CULTURE: YES
URINE TYPE: ABNORMAL
UROBILINOGEN, URINE: 0.2 E.U./DL
WBC UA: ABNORMAL /HPF (ref 0–5)

## 2020-11-29 PROCEDURE — 6370000000 HC RX 637 (ALT 250 FOR IP): Performed by: PHYSICIAN ASSISTANT

## 2020-11-29 PROCEDURE — 6370000000 HC RX 637 (ALT 250 FOR IP): Performed by: PSYCHIATRY & NEUROLOGY

## 2020-11-29 PROCEDURE — 99232 SBSQ HOSP IP/OBS MODERATE 35: CPT | Performed by: PSYCHIATRY & NEUROLOGY

## 2020-11-29 PROCEDURE — 1240000000 HC EMOTIONAL WELLNESS R&B

## 2020-11-29 PROCEDURE — 99231 SBSQ HOSP IP/OBS SF/LOW 25: CPT | Performed by: PHYSICIAN ASSISTANT

## 2020-11-29 PROCEDURE — 81001 URINALYSIS AUTO W/SCOPE: CPT

## 2020-11-29 PROCEDURE — 87086 URINE CULTURE/COLONY COUNT: CPT

## 2020-11-29 RX ORDER — FLUCONAZOLE 100 MG/1
150 TABLET ORAL ONCE
Status: COMPLETED | OUTPATIENT
Start: 2020-11-29 | End: 2020-11-29

## 2020-11-29 RX ORDER — CEPHALEXIN 500 MG/1
500 CAPSULE ORAL EVERY 12 HOURS SCHEDULED
Status: DISCONTINUED | OUTPATIENT
Start: 2020-11-29 | End: 2020-11-30

## 2020-11-29 RX ADMIN — ACETAMINOPHEN 650 MG: 325 TABLET ORAL at 18:41

## 2020-11-29 RX ADMIN — DULOXETINE HYDROCHLORIDE 60 MG: 60 CAPSULE, DELAYED RELEASE ORAL at 08:38

## 2020-11-29 RX ADMIN — QUETIAPINE FUMARATE 50 MG: 25 TABLET ORAL at 21:18

## 2020-11-29 RX ADMIN — DULOXETINE HYDROCHLORIDE 30 MG: 30 CAPSULE, DELAYED RELEASE ORAL at 13:13

## 2020-11-29 RX ADMIN — CEPHALEXIN 500 MG: 500 CAPSULE ORAL at 21:18

## 2020-11-29 RX ADMIN — ESCITALOPRAM OXALATE 10 MG: 10 TABLET ORAL at 21:19

## 2020-11-29 RX ADMIN — CLONIDINE HYDROCHLORIDE 0.1 MG: 0.1 TABLET ORAL at 02:13

## 2020-11-29 RX ADMIN — TRAMADOL HYDROCHLORIDE 50 MG: 50 TABLET, FILM COATED ORAL at 02:14

## 2020-11-29 RX ADMIN — DICYCLOMINE HYDROCHLORIDE 20 MG: 20 TABLET ORAL at 08:37

## 2020-11-29 RX ADMIN — ONDANSETRON 4 MG: 4 TABLET, ORALLY DISINTEGRATING ORAL at 08:38

## 2020-11-29 RX ADMIN — DICYCLOMINE HYDROCHLORIDE 20 MG: 20 TABLET ORAL at 18:41

## 2020-11-29 RX ADMIN — FLUCONAZOLE 150 MG: 100 TABLET ORAL at 13:13

## 2020-11-29 RX ADMIN — HYDROXYZINE PAMOATE 50 MG: 50 CAPSULE ORAL at 08:38

## 2020-11-29 RX ADMIN — ACETAMINOPHEN 650 MG: 325 TABLET ORAL at 13:13

## 2020-11-29 ASSESSMENT — PAIN SCALES - GENERAL
PAINLEVEL_OUTOF10: 5
PAINLEVEL_OUTOF10: 5
PAINLEVEL_OUTOF10: 3
PAINLEVEL_OUTOF10: 3

## 2020-11-29 NOTE — PROGRESS NOTES
PRN Tramadol 0.1 mg PO and Ultram 50 mg PO for reported withdrawal symptoms at this time.  Medications effective

## 2020-11-29 NOTE — BH NOTE
Received Tylenol 650 mg PO to aid in managing a headache and Bentyl 20 mg PO for management of abd cramping.

## 2020-11-29 NOTE — BH NOTE
Purposeful Rounding    Patient Location Day room    Patient willing to engage in conversationYes    Presentation/behavior Cooperative and Pleasant    Affect Neutral/Euthymic(normal)    Concerns reported: left flank discomfort    PRN medications given: not at this time.  inst to request as needed    Environmental assessment Room free from clutter    Fall prevention interventions in place: low risk    Daily [place] Fall Risk Score : 71    Daily Blevins Fall Risk Score : low

## 2020-11-29 NOTE — GROUP NOTE
Group Therapy Note    Date: 11/29/2020    Group Start Time: 0930  Group End Time: 1100  Group Topic: Cognitive Skills    [facility], ROSSY, IVETT, LSW      Group Therapy Note    Attendees: 10         Patient's Goal:  Pt identified things they need to more of/less of and discussed ways to achieve those goals. Discussed ways to increase overall well being through gratitude, achieving goals, fostering relationships, etc.    Notes:  Pt shared that she is struggling with the things she hasn't said to her mother and now that her mother is gone how to process that. Pt was open to feedback and provided support to others as they shared. Status After Intervention:  Improved    Participation Level:  Active Listener and Interactive    Participation Quality: Appropriate, Attentive, Sharing and Supportive      Speech:  normal      Thought Process/Content: Logical      Affective Functioning: Congruent      Mood: dysphoric      Level of consciousness:  Alert, Oriented x4 and Attentive      Response to Learning: Able to verbalize current knowledge/experience, Able to verbalize/acknowledge new learning, Able to retain information, Capable of insight, Able to change behavior and Progressing to goal      Endings: None Reported    Modes of Intervention: Education, Support, Socialization, Exploration, Clarifying, Problem-solving, Activity, Confrontation, Limit-setting and Reality-testing      Discipline Responsible: /Counselor      Signature:  ROSSY [address]

## 2020-11-29 NOTE — PROGRESS NOTES
Brief Progress Note    Date: 11/29/20    Subjective: c/o lower abdominal discomfort, pressure like sensation. Reported having loose stools but no melena or hematochezia. Also reports urinary frequency and mild dysuria. States she does have white thick vaginal discharge with some mild irritation. She is afebrile. Pt does report a hx of diverticulosis. Objective:  Vitals:    11/29/20 1115   BP: 126/66   Pulse: 75   Resp: 16   Temp:    SpO2:        Physical Exam:  General: pleasant  female, no distress, sitting comfortably in common area  Resp: CTA bilaterally, no rales, rhonchi or wheezes, on RA  Cardio: RRR, no appreciable murmur, no edema peripherally  Abd: soft, overweight, mild suprapubic/ LLQ tenderness w/o guarding. No distention. Normal bowel sounds  : No CVA tenderness, pelvic deferred at this time      Assessment:  1. Lower Abdominal Pain  2. Left Flank Pain  2. Dysuria, Urinary Urgency - concern for UTI  3. Vaginal Candidiasis     Plan:    1. Check UA with reflex to cx   - large LE, negative for blood  - no prior urine cx to compare  - start Keflex BID x 7 days, f/u on urine cx  2. Given 1x dose of Diflucan 150 mg PO  3.  Monitor for ongoing symptoms of abdominal pain despite abx therapy; no imaging recommended at this time    Tariq Juarez PA-C 4:34 PM 11/29/2020

## 2020-11-29 NOTE — BH NOTE
Holding left mid abd area with c/o cramping and nausea. Reports hx of diverticulitis. Reports increased anxiety. Received Vistaril 50 mg PO for management of anxiety. Received Bentyl 20 mg and Zofran 4 mg ODT for management of abd cramping and nausea. B/P 128/70 HR 76.

## 2020-11-29 NOTE — FLOWSHEET NOTE
Purposeful Rounding    Patient Location Day room    Patient willing to engage in conversationYes    Presentation/behavior Cooperative and Pleasant    Affect Neutral/Euthymic(normal)    Concerns reported: none    PRN medications given:none    Environmental assessment No safety hazards noted    Fall prevention interventions in place: Yellow non-skid socks on    Daily [place] Fall Risk Score :71    Daily [place] Fall Risk Score : low

## 2020-11-29 NOTE — FLOWSHEET NOTE
Purposeful Rounding     Patient Location Day room     Patient willing to engage in conversationYes     Presentation/behavior Cooperative and Pleasant     Affect Neutral/Euthymic(normal)     Concerns reported: none     PRN medications given:none     Environmental assessment No safety hazards noted     Fall prevention interventions in place: Yellow non-skid socks on     Daily [place] Fall Risk Score :71     Daily [place] Fall Risk Score : low

## 2020-11-29 NOTE — FLOWSHEET NOTE
Purposeful Rounding    Patient Location Day room    Patient willing to engage in conversationYes    Presentation/behavior Cooperative    Affect Neutral/Euthymic(normal)    Concerns reported: none    PRN medications given:none    Environmental assessment No safety hazards noted    Fall prevention interventions in place: Yellow non-skid socks on    Daily [place] Fall Risk Score :71    Daily [place] Fall Risk Score : low

## 2020-11-29 NOTE — PROGRESS NOTES
Department of Psychiatry  Attending Progress Note    Admission Date:    11/27/2020    Chief complaint / Reason for Admission:  Suicidal    Patient's chart was reviewed, case was discussed with nursing/OT/RT staff, and collaborated with  about the treatment plan. SUBJECTIVE:   Over last 24 hours:  Behavioral outbursts: No   Non-aggressive behavioral disturbance: No  Medication compliant: Yes  Need for seclusion/restraints: No  Sleeping adequately:  Yes  Appetite adequate: Yes  Attending groups: Yes    Patient noted by staff to minimize her symptoms to some degree. Her explanation of vicodin ingestion repeatedly changing. She has been cooperative with treatment and attending groups. Today patient reported that she feels staff have been helfpul as have groups. Emotionally \"I'm about the same, but I'm in a place where I can get better. \"  Continues to endorse fleeting SI.   Denies medication side effects    Progressing overall: Some improvement  Suicidal ideation: Fleeting  Homicidal ideation: Denies  Medication side effects: No    ROS: Patient has new complaints: no    Current Medications Ordered:   nicotine  1 patch Transdermal Daily    DULoxetine  60 mg Oral Daily    DULoxetine  30 mg Oral Lunch    escitalopram  10 mg Oral Nightly    QUEtiapine  50 mg Oral Nightly      PRN Meds: acetaminophen, hydrOXYzine, OLANZapine **OR** OLANZapine, sterile water, traZODone, benztropine mesylate, magnesium hydroxide, aluminum & magnesium hydroxide-simethicone, ondansetron, QUEtiapine, traMADol **AND** cloNIDine, dicyclomine     Objective:     PE:    /66   Pulse 75   Temp 97.8 °F (36.6 °C) (Temporal)   Resp 16   SpO2 93%       Motor / Gait: no abnormalities noted, normal gait and station    Mental Status Examination:    Appearance: WF, appears stated age,  in chair, wearing casual clothing, good grooming and hygiene   Behavior/Attitude toward examiner:  cooperative, attentive and fair eye contact  Speech:  spontaneous, normal rate, normal volume and well articulated   Mood:  \"About the same\"  Affect:  Mood congruent, Blunted   Thought processes:  Linear  Thought Content: Fleeting SI, denies HI, no delusions voiced, no obsessions  Perceptions: Denies AVH, not RTIS  Attention: fair  Abstraction: wnl  Cognition: Average JACOB, Alert and oriented to person, place, time, and situation, recall intact  Insight: Limited insight   Judgment: Limited judgment      LAB: Reviewed labs from last 24 hours      Formulation: Pt has had multiple psychosocial stressors and has worsening of his depression and suicide attempst. Pt has previous attempts and represents high risk of suicide  Dx: axis I: Mdd recurrent moderate, opiate and alcohol abuse  Axis 2: deferred   [place] 3: See Medical History     Axis 4: Problems with primary support group and Other psychosocial and environmental problems       Tx plan:    prevent self injury, stabilize affect, restore sleep, treat depression,  establish/maintain aftercare. All conditions present on admission are being treated while pt is hospitalized.      Medications:  Current Facility-Administered Medications: acetaminophen (TYLENOL) tablet 650 mg, 650 mg, Oral, Q4H PRN  hydrOXYzine (VISTARIL) capsule 50 mg, 50 mg, Oral, Q6H PRN  OLANZapine (ZYPREXA) tablet 5 mg, 5 mg, Oral, Q4H PRN **OR** OLANZapine (ZYPREXA) injection 10 mg, 10 mg, Intramuscular, TID PRN  sterile water injection 2.1 mL, 2.1 mL, Intramuscular, TID PRN  traZODone (DESYREL) tablet 50 mg, 50 mg, Oral, Nightly PRN  benztropine mesylate (COGENTIN) injection 2 mg, 2 mg, Intramuscular, BID PRN  magnesium hydroxide (MILK OF MAGNESIA) 400 MG/5ML suspension 30 mL, 30 mL, Oral, Daily PRN  aluminum & magnesium hydroxide-simethicone (MAALOX) 200-200-20 MG/5ML suspension 30 mL, 30 mL, Oral, Q6H PRN  nicotine (NICODERM CQ) 14 MG/24HR 1 patch, 1 patch, Transdermal, Daily  DULoxetine (CYMBALTA) extended release capsule 60 mg, 60 mg, Oral, Daily  DULoxetine (CYMBALTA) extended release capsule 30 mg, 30 mg, Oral, Lunch  ondansetron (ZOFRAN-ODT) disintegrating tablet 4 mg, 4 mg, Oral, Q8H PRN  escitalopram (LEXAPRO) tablet 10 mg, 10 mg, Oral, Nightly  QUEtiapine (SEROQUEL) tablet 25 mg, 25 mg, Oral, Q8H PRN  QUEtiapine (SEROQUEL) tablet 50 mg, 50 mg, Oral, Nightly  traMADol (ULTRAM) tablet 50 mg, 50 mg, Oral, PRN **AND** cloNIDine (CATAPRES) tablet 0.1 mg, 0.1 mg, Oral, PRN  dicyclomine (BENTYL) tablet 20 mg, 20 mg, Oral, Q6H PRN     Estimated length of stay: 3-5 days  Prognosis:  guarded   Criteria for Discharge:    Not suicidal, sleeping well, affect stable, depression improving, eating well, aftercare arranged. Total face to face time with patient was 30 minutes and more than 50 % of that time was spent counseling the patient on their symptoms, treatment and expected goals.     Adalgisa Peralta MD  Staff Psychiatrist

## 2020-11-29 NOTE — GROUP NOTE
Group Therapy Note    Date: 11/29/2020    Group Start Time: 9730  Group End Time: 0106  Group Topic: Cognitive Skills    [facility]    Dance/Movement Therapy Group Note    Attendees: 14    Patients were led through a group get to know each other movement activity and a movement warm up. Then, patients selected inspirational and upbeat music to listen to together with peers during group and set intention to either sing or create expressive movements for the songs. Patients engaged with one another throughout the group and discussed using music and movement as coping skills to uplift their mood. Notes:  Pt was hesitant to engage in group movement activities; pt appeared to listen to music and worked on coloring sheets. At end of group, pt requested the song \"Happy\" by Sara and began dancing with peers. Pt's affect appeared to brighten when she was engaged with peers in movement. Status After Intervention:  Improved    Participation Level:  Active Listener and Interactive    Participation Quality: Appropriate and Attentive      Speech:  normal      Thought Process/Content: Linear      Affective Functioning: Constricted/Restricted      Mood: anxious      Level of consciousness:  Alert and Oriented x4      Response to Learning: Able to verbalize current knowledge/experience, Able to verbalize/acknowledge new learning and Able to retain information      Endings: None Reported    Modes of Intervention: Education, Support, Socialization, Exploration, Clarifying, Problem-solving, Activity and Movement      Discipline Responsible: /Counselor      Signature:  JOHN Lombardi-S, R-NIK

## 2020-11-30 LAB — URINE CULTURE, ROUTINE: NORMAL

## 2020-11-30 PROCEDURE — 6370000000 HC RX 637 (ALT 250 FOR IP): Performed by: PSYCHIATRY & NEUROLOGY

## 2020-11-30 PROCEDURE — 1240000000 HC EMOTIONAL WELLNESS R&B

## 2020-11-30 PROCEDURE — 97535 SELF CARE MNGMENT TRAINING: CPT

## 2020-11-30 PROCEDURE — 6370000000 HC RX 637 (ALT 250 FOR IP): Performed by: PHYSICIAN ASSISTANT

## 2020-11-30 PROCEDURE — 97165 OT EVAL LOW COMPLEX 30 MIN: CPT

## 2020-11-30 PROCEDURE — 99233 SBSQ HOSP IP/OBS HIGH 50: CPT | Performed by: PSYCHIATRY & NEUROLOGY

## 2020-11-30 RX ORDER — CEPHALEXIN 500 MG/1
500 CAPSULE ORAL EVERY 12 HOURS SCHEDULED
Status: DISCONTINUED | OUTPATIENT
Start: 2020-11-30 | End: 2020-12-01 | Stop reason: HOSPADM

## 2020-11-30 RX ORDER — CLONIDINE HYDROCHLORIDE 0.1 MG/1
0.1 TABLET ORAL 2 TIMES DAILY
Status: DISCONTINUED | OUTPATIENT
Start: 2020-11-30 | End: 2020-12-01 | Stop reason: HOSPADM

## 2020-11-30 RX ORDER — DULOXETIN HYDROCHLORIDE 20 MG/1
20 CAPSULE, DELAYED RELEASE ORAL DAILY
Status: DISCONTINUED | OUTPATIENT
Start: 2020-11-30 | End: 2020-11-30

## 2020-11-30 RX ORDER — QUETIAPINE FUMARATE 25 MG/1
25 TABLET, FILM COATED ORAL NIGHTLY
Status: DISCONTINUED | OUTPATIENT
Start: 2020-11-30 | End: 2020-12-01 | Stop reason: HOSPADM

## 2020-11-30 RX ORDER — HYDROXYZINE PAMOATE 25 MG/1
25 CAPSULE ORAL EVERY 6 HOURS PRN
Status: DISCONTINUED | OUTPATIENT
Start: 2020-11-30 | End: 2020-12-01 | Stop reason: HOSPADM

## 2020-11-30 RX ORDER — DULOXETIN HYDROCHLORIDE 20 MG/1
40 CAPSULE, DELAYED RELEASE ORAL
Status: DISCONTINUED | OUTPATIENT
Start: 2020-11-30 | End: 2020-12-01 | Stop reason: HOSPADM

## 2020-11-30 RX ORDER — QUETIAPINE FUMARATE 25 MG/1
12.5 TABLET, FILM COATED ORAL EVERY 8 HOURS PRN
Status: DISCONTINUED | OUTPATIENT
Start: 2020-11-30 | End: 2020-12-01 | Stop reason: HOSPADM

## 2020-11-30 RX ADMIN — CEPHALEXIN 500 MG: 500 CAPSULE ORAL at 09:01

## 2020-11-30 RX ADMIN — CLONIDINE HYDROCHLORIDE 0.1 MG: 0.1 TABLET ORAL at 21:09

## 2020-11-30 RX ADMIN — CLONIDINE HYDROCHLORIDE 0.1 MG: 0.1 TABLET ORAL at 12:42

## 2020-11-30 RX ADMIN — DULOXETINE HYDROCHLORIDE 60 MG: 60 CAPSULE, DELAYED RELEASE ORAL at 09:01

## 2020-11-30 RX ADMIN — CEPHALEXIN 500 MG: 500 CAPSULE ORAL at 21:09

## 2020-11-30 RX ADMIN — DULOXETINE 40 MG: 20 CAPSULE, DELAYED RELEASE ORAL at 12:42

## 2020-11-30 RX ADMIN — ACETAMINOPHEN 650 MG: 325 TABLET ORAL at 12:41

## 2020-11-30 RX ADMIN — QUETIAPINE FUMARATE 25 MG: 25 TABLET ORAL at 21:09

## 2020-11-30 RX ADMIN — DICYCLOMINE HYDROCHLORIDE 20 MG: 20 TABLET ORAL at 09:03

## 2020-11-30 ASSESSMENT — PAIN SCALES - GENERAL
PAINLEVEL_OUTOF10: 6
PAINLEVEL_OUTOF10: 0
PAINLEVEL_OUTOF10: 3

## 2020-11-30 NOTE — FLOWSHEET NOTE
Purposeful Rounding     Patient Location Patient room     Patient willing to engage in conversationNo     Presentation/behavior Other sleeping*     Affect sleeping     Concerns reported: none     PRN medications given:none     Environmental assessment No safety hazards noted     Fall prevention interventions in place: low risk     Daily [place] Fall Risk Score :71     Daily [place] Fall Risk Score : low
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
## 2020-11-30 NOTE — GROUP NOTE
Group Therapy Note    Date: 11/29/2020    Group Start Time: 2040  Group End Time: 2120  Group Topic: Wrap-Up    [address] [facility]        Group Therapy Note    Attendees: Goals and importance of goal setting discussed. Night time milieu activities discussed. Patient's Goal:  Continue writing letter, just keep going    Notes:  Successful     Status After Intervention:  Improved    Participation Level:  Active Listener and Interactive    Participation Quality: Appropriate and Attentive      Speech:  normal      Thought Process/Content: Logical  Linear      Affective Functioning: Congruent      Mood: euthymic      Level of consciousness:  Alert and Oriented x4      Response to Learning: Progressing to goal      Endings: None Reported    Modes of Intervention: Support      Discipline Responsible: Behavorial Health Tech      Signature:  Janay Rodriguez
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
## 2020-11-30 NOTE — GROUP NOTE
Group Therapy Note    Date: 11/30/2020    Group Start Time: 1600  Group End Time: 5600  Group Topic: Healthy Living/Wellness    [address] [facility], RN        Group Therapy Note    Attendees: 10/23         Patient's Goal:  To complete a safety plan to build on coping skills and safety.        Discipline Responsible: Registered Nurse      Signature:  Sunshine Santos RN

## 2020-11-30 NOTE — PROGRESS NOTES
Inpatient Occupational Therapy  Evaluation and Treatment    Unit:  [facility]  Date:  11/30/2020  Patient Name:    Pilar Hercules  Admitting diagnosis:  Unspecified mood (affective) disorder ([facility] [address] Mohit Crain  Admit Date:  11/27/2020  Precautions/Restrictions/WB Status/ Lines/ Wounds/ Oxygen:  Up as tolerated  Treatment Time:  13:18-13:53  Treatment Number:  1    Patient Goals for Therapy:  \" Help to let things go. \"      Discharge Recommendations:   []Home Independently  [x] Home with assist [] Home OT  []SNF  []ARU    DME needs for discharge:   NA     AM-PAC Score:   24     Home Health S4 Level: [x] NA     ACLS:   Completed 11/30/2020  Mode 4.6  Engaging Abilities and Following Safety Precautions When the Person Can Scan the Environment     DESCRIPTION:    30% Cognitive Assistance: The person may live alone with daily assistance to monitor personal safety and provide a daily allowance. Bills and other money management concerns require assistance. Reminders may be required to do household chores, attend familiar community events, or any other additions to household routines (30% minimum cognitive assistance). 8% Physical Assistance is needed for fine motor activities. Preadmission Environment:    Pt. Lives   [] alone  [x]with neice  Home environment:   [x]Apartment   []one story  []two story home. Steps to enter first floor:    [] No steps     [x]  2 Steps to enter   [] Railings    Bathroom: [x] Bath Tub Shower  []Walk in [address] [place] [address]  [] [employer] owned: [] RW [] SW  []Rollator []W/C  []Shower Chair []BSC []Reacher  [employer] [] Other     Preadmission Status / PLOF:  History of falls   [x]Yes  []No  3x; bathtub slipped (educated pt on non-stick pads for tub floor and grab bars. Pt. Verbalized understanding.)  Outside; tripped  Pt. Able to drive   [x]Yes  []No   Pt Fully independent for ADLs/IADLs. [x]Yes  []No    Pt.  Required assistance from family for:  []Bathing []Dressing []Cooking []Cleaning  []Laundry []Other :   Pt. 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 Leisure Interests:  Movies, reading, spending time with daughter and grandson, cleaning, yoga, music, coloring, drawing  Medication Management:  Self; reports no difficulty taking medicaitons  Health Management:   Pt. Reports that she has a PCP, however no Psychiatrist or therapist.  Social Network:  9 good friends   Stressors:  Agitation (difficulty using coping skills), passing of mother and father  Coping Skills:  Praying/talkinig to God, coloring, drawing    Pain  [x]Yes  []No  Ratin:10  Location: [place]  Pain Medicine Status: [] Denies need  [] Pain med requested  [x] RN notified. Cognition    A&Ox4, patient appropriate and cooperative. Follows []1 step and [x] 3 step commands. Upper Extremity ROM:    WFL, pt able to perform all bed mobility, transfers, and gait without ROM limitation. Upper Extremity Strength:    WFL, pt able to perform all bed mobility, transfers, and gait without strength limitation. Upper Extremity Sensation:    No apparent deficits. Upper Extremity Proprioception:    No apparent deficits. Skin Integrity:  WFL     Coordination and Tone:  WFL    Balance  Static Sitting:  [x] Good [] Fair [] Poor  Dynamic Sitting:  [x] Good [] Fair [] Poor  Static Standing: [x] Good [] Fair [] Poor  Dynamic Standing: [x] Good [] Fair [] Poor    Bed mobility:  Independent  Supine to sit:  Sit to supine:  Scooting to head of bed:  Scooting in sitting:  Rolling:  Bridging:    Transfers:  Independent  Sit to stand:  Stand to sit:  Bed/Chair to/from toilet:    Self Care:   Independent  Toileting:  Grooming:  Dressing:    Exercise / Activities Initiated:   Pt. Educated on role of OT. Pt. Participated in:  Eval, ACLS, ADL Retraining, and health management. Assessment of Deficits:   Pt demonstrated decreased activity tolerance, decreased safety awareness, decreased cognition, and decreased ADL/IADL status. Pt. Limited during evaluation by decreased cognition. At end of evaluation, pt. In gathering room. Goal(s) : To be met in 3 Visits:  1). Pt. To complete ACLS. 2). Pt. To verbalize understanding of sleep hygiene education. To be met in 5 Visits:  1). Pt. To complete 1 SMART long term goal and 2 SMART short term goals with mod assist.  2). Pt. To verbalize 3 new coping skills. 3). Pt. To complete interest check list.    4). Pt. To verbalize understanding of 3 communication styles. 5). Pt. To complete wellness plan. 6). Pt. To complete a daily schedule of healthy activities/routines with mod assist.   7). Pt. To identify 2 memory strategies to take medications as prescribed. Rehabilitation Potential:  Good for goals listed above. Strengths for achieving goals include:  PLOF  Barriers to achieving goals include:  Decreased Cognition     Plan: To be seen 2-5x/week while in acute care setting for therapeutic exercises/act, ADL retraining, NMR and patient/caregiver ed/instruction.      Timed Code Treatment Minutes:   25  minutes    Total Treatment Time:    35  minutes    Signature and License Number    JACK Perdomo/L  #[id number]        If patient discharges from this facility prior to next visit, this note will serve as the Discharge Summary

## 2020-11-30 NOTE — FLOWSHEET NOTE
Purposeful Rounding    Patient Location Day room    Patient willing to engage in conversationYes    Presentation/behavior Cooperative and Pleasant    Affect Neutral/Euthymic(normal)    Concerns reported: none    PRN medications given:none    Environmental assessment No safety hazards noted    Fall prevention interventions in place: low risk    Daily [place] Fall Risk Score :71    Daily [place] Fall Risk Score : low

## 2020-11-30 NOTE — FLOWSHEET NOTE
Purposeful Rounding    Patient Location Patient room    Patient willing to engage in conversationNo    Presentation/behavior Other sleeping*    Affect sleeping    Concerns reported: none    PRN medications given:none    Environmental assessment No safety hazards noted    Fall prevention interventions in place: low risk    Daily [place] Fall Risk Score :71    Daily Blevins Fall Risk Score : low

## 2020-12-01 VITALS
DIASTOLIC BLOOD PRESSURE: 81 MMHG | HEART RATE: 72 BPM | OXYGEN SATURATION: 96 % | RESPIRATION RATE: 16 BRPM | SYSTOLIC BLOOD PRESSURE: 123 MMHG | TEMPERATURE: 97.8 F

## 2020-12-01 PROCEDURE — 6370000000 HC RX 637 (ALT 250 FOR IP): Performed by: PSYCHIATRY & NEUROLOGY

## 2020-12-01 PROCEDURE — 99239 HOSP IP/OBS DSCHRG MGMT >30: CPT | Performed by: PSYCHIATRY & NEUROLOGY

## 2020-12-01 PROCEDURE — 5130000000 HC BRIDGE APPOINTMENT

## 2020-12-01 PROCEDURE — 6370000000 HC RX 637 (ALT 250 FOR IP): Performed by: PHYSICIAN ASSISTANT

## 2020-12-01 RX ORDER — DULOXETINE 40 MG/1
40 CAPSULE, DELAYED RELEASE ORAL
Qty: 30 CAPSULE | Refills: 0 | Status: ON HOLD
Start: 2020-12-01 | End: 2021-07-22 | Stop reason: HOSPADM

## 2020-12-01 RX ORDER — CEPHALEXIN 500 MG/1
500 CAPSULE ORAL EVERY 12 HOURS SCHEDULED
Qty: 4 CAPSULE | Refills: 0 | Status: SHIPPED | OUTPATIENT
Start: 2020-12-01 | End: 2020-12-03

## 2020-12-01 RX ORDER — DULOXETIN HYDROCHLORIDE 60 MG/1
60 CAPSULE, DELAYED RELEASE ORAL DAILY
Qty: 30 CAPSULE | Refills: 0 | Status: ON HOLD
Start: 2020-12-02 | End: 2021-07-22 | Stop reason: HOSPADM

## 2020-12-01 RX ORDER — QUETIAPINE FUMARATE 25 MG/1
25 TABLET, FILM COATED ORAL NIGHTLY
Qty: 30 TABLET | Refills: 0 | Status: ON HOLD | OUTPATIENT
Start: 2020-12-01 | End: 2021-07-22 | Stop reason: SDUPTHER

## 2020-12-01 RX ORDER — QUETIAPINE FUMARATE 25 MG/1
12.5 TABLET, FILM COATED ORAL EVERY 8 HOURS PRN
Qty: 45 TABLET | Refills: 0 | Status: ON HOLD
Start: 2020-12-01 | End: 2021-07-22 | Stop reason: HOSPADM

## 2020-12-01 RX ORDER — CLONIDINE HYDROCHLORIDE 0.1 MG/1
0.1 TABLET ORAL 2 TIMES DAILY
Qty: 60 TABLET | Refills: 0 | Status: ON HOLD | OUTPATIENT
Start: 2020-12-01 | End: 2021-12-20 | Stop reason: SDUPTHER

## 2020-12-01 RX ORDER — DICYCLOMINE HCL 20 MG
20 TABLET ORAL EVERY 6 HOURS PRN
Qty: 120 TABLET | Refills: 0 | Status: ON HOLD
Start: 2020-12-01 | End: 2021-07-22 | Stop reason: HOSPADM

## 2020-12-01 RX ADMIN — DULOXETINE 40 MG: 20 CAPSULE, DELAYED RELEASE ORAL at 12:01

## 2020-12-01 RX ADMIN — ACETAMINOPHEN 650 MG: 325 TABLET ORAL at 06:07

## 2020-12-01 RX ADMIN — CEPHALEXIN 500 MG: 500 CAPSULE ORAL at 09:04

## 2020-12-01 RX ADMIN — DULOXETINE HYDROCHLORIDE 60 MG: 60 CAPSULE, DELAYED RELEASE ORAL at 09:04

## 2020-12-01 RX ADMIN — DICYCLOMINE HYDROCHLORIDE 20 MG: 20 TABLET ORAL at 09:09

## 2020-12-01 RX ADMIN — CLONIDINE HYDROCHLORIDE 0.1 MG: 0.1 TABLET ORAL at 09:04

## 2020-12-01 ASSESSMENT — PAIN DESCRIPTION - ORIENTATION: ORIENTATION: ANTERIOR

## 2020-12-01 ASSESSMENT — PAIN DESCRIPTION - LOCATION: LOCATION: HEAD

## 2020-12-01 ASSESSMENT — PAIN SCALES - GENERAL: PAINLEVEL_OUTOF10: 8

## 2020-12-01 ASSESSMENT — PAIN DESCRIPTION - PAIN TYPE: TYPE: ACUTE PAIN

## 2020-12-01 ASSESSMENT — PAIN DESCRIPTION - ONSET: ONSET: AWAKENED FROM SLEEP

## 2020-12-01 ASSESSMENT — PAIN DESCRIPTION - PROGRESSION: CLINICAL_PROGRESSION: GRADUALLY WORSENING

## 2020-12-01 ASSESSMENT — PAIN - FUNCTIONAL ASSESSMENT: PAIN_FUNCTIONAL_ASSESSMENT: PREVENTS OR INTERFERES SOME ACTIVE ACTIVITIES AND ADLS

## 2020-12-01 ASSESSMENT — PAIN DESCRIPTION - FREQUENCY: FREQUENCY: INTERMITTENT

## 2020-12-01 ASSESSMENT — PAIN DESCRIPTION - DESCRIPTORS: DESCRIPTORS: HEADACHE

## 2020-12-01 NOTE — BH NOTE
[facility]  Discharge Note    Pt discharged with followings belongings:   Dentures: None  Vision - Corrective Lenses: Glasses(2 pair)  Hearing Aid: None  Jewelry: Watch, Ring  Body Piercings Removed: N/A  Clothing: Footwear, Pants, Shirt, Undergarments (Comment)  Were All Patient Medications Collected?: Yes  Other Valuables: Cell phone, Money (Comment), Purse, Wallet, Keys($8 cash, 1 credit card, cell phone to safe)   Valuables sent home with patient. Valuables retrieved from safe, Security envelope number:  yes and returned to patient. Patient education on aftercare instructions: yes Patient verbalize understanding of AVS:  yes. Status EXAM upon discharge:  Status and Exam  Normal: Yes  Facial Expression: Brightened  Affect: Congruent  Level of Consciousness: Alert  Mood:Normal: Yes  Mood: Anxious, Other (Comment)(WNL)  Motor Activity:Normal: Yes  Motor Activity: Agitated  Interview Behavior: Cooperative  Preception: [place] to Person, Pegge Grabill to Time, [place] to Place, [place] to Situation  Attention:Normal: Yes  Thought Processes: Other(See comment)(WNL; logical)  Thought Content:Normal: Yes  Hallucinations: None  Delusions: No  Memory:Normal: Yes  Memory: Poor Recent  Insight and Judgment: Yes  Insight and Judgment: Other(See comment)(IMPROVED; WNL)  Present Suicidal Ideation: No  Present Homicidal Ideation: No      Metabolic Screening:    Lab Results   Component Value Date    LABA1C 5.5 11/28/2020       Lab Results   Component Value Date    CHOL 199 11/28/2020    CHOL 203 (H) 02/02/2010     Lab Results   Component Value Date    TRIG 95 11/28/2020    TRIG 94 02/02/2010     Lab Results   Component Value Date    HDL 73 (H) 11/28/2020    HDL 57 02/02/2010     No components found for: [facility]  Lab Results   Component Value Date    LABVLDL 19 11/28/2020    LABVLDL 19 02/02/2010       Ruddy Moyer RN    Bridge Appointment completed: Reviewed Discharge Instructions with patient.     Patient verbalizes

## 2020-12-01 NOTE — SUICIDE SAFETY PLAN
SAFETY PLAN    A suicide Safety Plan is a document that supports someone when they are having thoughts of suicide. Warning Signs that indicate a suicidal crisis may be developing: What (situations, thoughts, feelings, body sensations, behaviors, etc.) do you experience that lets you know you are beginning to think about suicide? 1. Overwhlemed  2. anxiety  3. Shaking, completely shut down    Internal Coping Strategies:  What things can I do (relaxation techniques, hobbies, physical activities, etc.) to take my mind off my problems without contacting another person? 1. Talk, walking, animals, changed behavior   2. coloring  3. Ask for help    People and social settings that provide distraction: Who can I call or where can I go to distract me? 1. Name: Police             2. Name: Leticia Crisp  Phone: [phone]  3. Name: Mis Descuentos  Phone: [phone]    People whom I can ask for help: Who can I call when I need help - for example, friends, family, clergy, someone else? 1. Name: Police             2. Name: Leticia Crisp  Phone: [phone]  3. Name: Mis Descuentos  Phone: [phone]    Professionals or [address] [place], [place] [address]vd I can contact during a crisis: Who can I call for help - for example, my doctor, my psychiatrist, my psychologist, a mental health provider, a suicide hotline? 1. Clinician Name: [address] [place] [address]   Address: [address] [place], [facility]      Phone  #: [phone]    2. Suicide Prevention Lifeline: 2-172-859-TALK (7159)    Making the environment safe: How can I make my environment (house/apartment/living space) safer? For example, can I remove guns, medications, and other items? 1.  Talk to my doctor or police    Something that is important to me and worth living for: my family, friends, myself

## 2020-12-01 NOTE — BH NOTE
Group Therapy Note    Date: 11/30/2020  Start Time: 20:00  End Time:  21:00  Number of Participants: 21    Type of Group: Recreational  Wrap up    Wellness Binder Information  Module Name:  /  Session Number:  /    Patient's Goal:  no goal made    Notes:  /    Status After Intervention:  Unchanged    Participation Level:  Active Listener and Interactive    Participation Quality: Appropriate, Attentive and Sharing      Speech:  normal      Thought Process/Content: Logical      Affective Functioning: Blunted      Mood: anxious      Level of consciousness:  Alert and Attentive      Response to Learning: Able to change behavior      Endings: None Reported    Modes of Intervention: Socialization and Problem-solving      Discipline Responsible: Behavorial Health Tech      Signature:  Sherlyn Monteiro

## 2020-12-01 NOTE — FLOWSHEET NOTE
Purposeful Rounding    Patient Location Day room    Patient willing to engage in conversationYes    Presentation/behavior Cooperative and Pleasant    Affect Neutral/Euthymic(normal)    Concerns reported: none    PRN medications given:none    Environmental assessment Room free from clutter, Clear path to bathroom  and No safety hazards noted    Fall prevention interventions in place: not a fall risk at this time.     Daily [place] Fall Risk Score :71    Daily Blevins Fall Risk Score : 0

## 2020-12-01 NOTE — GROUP NOTE
Group Therapy Note    Date: 12/1/2020    Group Start Time: 1000  Group End Time: 1100  Group Topic: [facility]        Group Therapy Note    Attendees: 12         Patient's Goal:  Patients were invited to participate in an Art Therapy / Psycho-Education group on anger and viewing ones emotions as guides. Patients received a handout on the metaphor of the Tawanda Hoffman" looking at anger as a secondary emotion in response to feelings of fear, helplessness, despair, etc. Patients were invited to use an art material of their choosing to process a strong emotion they had been experiencing and the underlying emotions and the reasons behind the emotion. Patients were encouraged to share and process their artwork with the group. Notes:  La appeared attentive to the information provided on emotions as guides, participated in group discussion, and engaged in art making. Status After Intervention:  Improved    Participation Level:  Active Listener and Interactive    Participation Quality: Appropriate, Attentive, Sharing and Supportive      Speech:  normal      Thought Process/Content: Logical      Affective Functioning: Exaggerated      Mood: euphoric      Level of consciousness:  Alert, Oriented x4 and Attentive      Response to Learning: Able to verbalize current knowledge/experience, Able to verbalize/acknowledge new learning, Able to retain information and Capable of insight      Endings: None Reported    Modes of Intervention: Education, Support, Socialization, Exploration, Clarifying, Problem-solving, Activity and Media      Discipline Responsible: Psychoeducational Specialist      Signature:  Randee Cummins

## 2020-12-30 NOTE — DISCHARGE SUMMARY
Discharge Summary   Admit Date: 11/27/2020   Discharge Date:  12/1/2020   Spent over 40 minutes with patient and staff on [facility]   Final Dx: axis I: Mdd recurrent moderate, opiate and alcohol abuse  Axis 2: deferred   [place] 3: See Medical History     Axis 4: Problems with primary support group and Other psychosocial and environmental problems      Condition on DC  Mood and affect are stable and pt is not suicidal   VITALS:  /81   Pulse 72   Temp 97.8 °F (36.6 °C) (Temporal)   Resp 16   SpO2 96% Brief Summary Present Illness   47 y/o wf with hx of depression, opiate and alcohol abuse that came to ed for worsening depression and suicide attempt. Patient arrived by EMS for intentional overdose. Emma  states she called EMS. Emma  states this morning she woke up and states she was feeling depressed.  Patient dates her mother just recently passed away at the beginning of this month. Emma  states she \"just wanted to go see her mom\".  When asked what she means by this patient states she wants to [facility] to see her in [facility]\".  Patient states she started around 10 AM taking medications and drinking alcohol and attempt to harm her self.  Patient states she had 1 bottle of bourbon and snorted #3-5 7.5/325 Vicodin tablets over an extended period of time.  Patient states he also snorted approximately 1400 mg of Neurontin.  Patient states she counted out the medication before she snorted it.  Patient states she took this over extended period of time. Emma  states she did not take it all at once because Nancy Sparks is not stupid\".  Patient states last time she snorted some of this medication was just prior to arrival.  Patient states these are not her medications and states \"do not ask me tell you where I got them from\".  Patient states she was thinking about cutting her wrists which she has done in the past. States she still feels like she would go home and \"do it again\". Pt stated that she feels depressed, anhedonia, dec sleep, dec e, hopeless/helpless, no psychosis, si. Pt stated that she has been abusing vicodin intranasally for one month and stated that she is using as much as she can get her hands on. She stated that she drank yesterday but has not been drinking consistently. Pt stated that depression worsened when lost job as  in march, may she lost her dad and 10/31 she lost her mom. Pt stated that she feels anxious and she just needs help with her depression. Urine received in a container without preservatives.  Urine Reflex to Culture 11/29/2020 Yes   Final    WBC, UA 11/29/2020 21-50* 0 - 5 /HPF Final    RBC, UA 11/29/2020 3-4  0 - 4 /HPF Final    Epithelial Cells, UA 11/29/2020 2-5  0 - 5 /HPF Final    Bacteria, UA 11/29/2020 1+* None Seen /HPF Final    Amorphous, UA 11/29/2020 1+  /HPF Final    Urine Culture, Routine 11/29/2020 <50,000 CFU/ml mixed skin/urogenital kathy. No further workup   Final        Mental Status Exam at Discharge:  Level of consciousness:  awake  Appearance:  well-appearing, in chair, good grooming and good hygiene well-developed, well-nourished  Behavior/Motor:  no abnormalities noted normal gait and station AIMS: 0  Attitude toward examiner:  cooperative, attentive and good eye contact  Speech:  spontaneous, normal rate, normal volume and well articulated  Mood:  dysthymic  Affect:  mood congruent Anxiety: mild  Hallucinations: Absent  Thought processes:  coherent Attention span, Concentration & Attention:  attention span and concentration were age appropriate  Thought content:  Reality based no evidence of delusions OCD: none    Insight: normal insight and judgment Cognition:  oriented to person, place, and time  Fund of Knowledge: average  IQ:average Memory: intact  Suicide:  No specific plan to harm self  Sleep: sleeps through the night  Appetite: ok   Reassess [place] Risk:  no specific plan to harm self Pt has phone numbers to contact if suicidal thoughts recur and states pt will return to the hospital if suicidal feelings return.    Hospital Routine Meds:     Hospital PRN Meds:    Discharge Meds:    Discharge Medication List as of 12/1/2020  1:15 PM           Details   dicyclomine (BENTYL) 20 MG tablet Take 1 tablet by mouth every 6 hours as needed (Abdominal Cramping), Disp-120 tablet,R-0Normal      cephALEXin (KEFLEX) 500 MG capsule Take 1 capsule by mouth every 12 hours for 2 days, Disp-4 capsule,R-0Normal cloNIDine (CATAPRES) 0.1 MG tablet Take 1 tablet by mouth 2 times daily, Disp-60 tablet,R-0Normal              Details   !! DULoxetine (CYMBALTA) 60 MG extended release capsule Take 1 capsule by mouth daily, Disp-30 capsule,R-0Normal      !! DULoxetine 40 MG CPEP Take 40 mg by mouth Daily with lunch, Disp-30 capsule,R-0Normal      !! QUEtiapine (SEROQUEL) 25 MG tablet Take 1 tablet by mouth nightly, Disp-30 tablet,R-0Normal      !! QUEtiapine (SEROQUEL) 25 MG tablet Take 0.5 tablets by mouth every 8 hours as needed for Other (anxiety), Disp-45 tablet,R-0Normal       !! - Potential duplicate medications found. Please discuss with provider.            Details   nicotine (NICODERM CQ) 14 MG/24HR Place 1 patch onto the skin daily, Disp-30 patch, R-3Print      vitamin B-1 100 MG tablet Take 1 tablet by mouth daily, Disp-30 tablet, R-1Print      vitamin D (CHOLECALCIFEROL) 1000 UNIT TABS tablet Take 1 tablet by mouth daily, Disp-30 tablet, R-1Print      Multiple Vitamin (MULTIVITAMIN) tablet Take 1 tablet by mouth daily, Disp-30 tablet, R-0Normal               Disposition - Residence Home or Self Care       Follow Up:  See Discharge Instructions

## 2021-01-13 ENCOUNTER — APPOINTMENT (OUTPATIENT)
Dept: GENERAL RADIOLOGY | Age: 56
End: 2021-01-13

## 2021-01-13 ENCOUNTER — HOSPITAL ENCOUNTER (EMERGENCY)
Age: 56
Discharge: LEFT BEFORE DISCHARGE | End: 2021-01-13

## 2021-01-13 VITALS
SYSTOLIC BLOOD PRESSURE: 165 MMHG | OXYGEN SATURATION: 97 % | HEIGHT: 64 IN | WEIGHT: 184.53 LBS | HEART RATE: 65 BPM | RESPIRATION RATE: 17 BRPM | BODY MASS INDEX: 31.5 KG/M2 | DIASTOLIC BLOOD PRESSURE: 95 MMHG | TEMPERATURE: 97.9 F

## 2021-01-13 PROCEDURE — 71046 X-RAY EXAM CHEST 2 VIEWS: CPT

## 2021-01-13 PROCEDURE — 4500000002 HC ER NO CHARGE

## 2021-01-13 ASSESSMENT — PAIN DESCRIPTION - ONSET: ONSET: ON-GOING

## 2021-01-13 ASSESSMENT — PAIN DESCRIPTION - FREQUENCY: FREQUENCY: CONTINUOUS

## 2021-01-13 ASSESSMENT — PAIN DESCRIPTION - DESCRIPTORS: DESCRIPTORS: ACHING

## 2021-01-13 ASSESSMENT — PAIN SCALES - GENERAL: PAINLEVEL_OUTOF10: 8

## 2021-01-13 ASSESSMENT — PAIN DESCRIPTION - LOCATION: LOCATION: GENERALIZED

## 2021-01-13 ASSESSMENT — PAIN - FUNCTIONAL ASSESSMENT: PAIN_FUNCTIONAL_ASSESSMENT: ACTIVITIES ARE NOT PREVENTED

## 2021-01-13 NOTE — ED NOTES
Per registration the patient left. Staff was not made aware of her leaving.       Garrick Warren RN  01/13/21 5205

## 2021-01-17 ENCOUNTER — HOSPITAL ENCOUNTER (EMERGENCY)
Age: 56
Discharge: PSYCHIATRIC OR SUBSTANCE USE FACILITY | End: 2021-01-18
Attending: STUDENT IN AN ORGANIZED HEALTH CARE EDUCATION/TRAINING PROGRAM

## 2021-01-17 ENCOUNTER — APPOINTMENT (OUTPATIENT)
Dept: CT IMAGING | Age: 56
End: 2021-01-17

## 2021-01-17 DIAGNOSIS — K57.30 DIVERTICULOSIS OF COLON WITHOUT DIVERTICULITIS: ICD-10-CM

## 2021-01-17 DIAGNOSIS — R45.851 DEPRESSION WITH SUICIDAL IDEATION: Primary | ICD-10-CM

## 2021-01-17 DIAGNOSIS — F32.A DEPRESSION WITH SUICIDAL IDEATION: Primary | ICD-10-CM

## 2021-01-17 DIAGNOSIS — Z72.89 DELIBERATE SELF-CUTTING: ICD-10-CM

## 2021-01-17 DIAGNOSIS — R10.9 LEFT SIDED ABDOMINAL PAIN: ICD-10-CM

## 2021-01-17 DIAGNOSIS — N20.0 LEFT NEPHROLITHIASIS: ICD-10-CM

## 2021-01-17 DIAGNOSIS — E87.20 LACTIC ACIDOSIS: ICD-10-CM

## 2021-01-17 DIAGNOSIS — K44.9 HIATAL HERNIA: ICD-10-CM

## 2021-01-17 LAB
A/G RATIO: 1.5 (ref 1.1–2.2)
ACETAMINOPHEN LEVEL: <5 UG/ML (ref 10–30)
ALBUMIN SERPL-MCNC: 4.3 G/DL (ref 3.4–5)
ALP BLD-CCNC: 119 U/L (ref 40–129)
ALT SERPL-CCNC: 50 U/L (ref 10–40)
AMPHETAMINE SCREEN, URINE: ABNORMAL
ANION GAP SERPL CALCULATED.3IONS-SCNC: 15 MMOL/L (ref 3–16)
AST SERPL-CCNC: 31 U/L (ref 15–37)
BACTERIA: ABNORMAL /HPF
BARBITURATE SCREEN URINE: ABNORMAL
BASE EXCESS VENOUS: -0.9 MMOL/L (ref -3–3)
BASOPHILS ABSOLUTE: 0.1 K/UL (ref 0–0.2)
BASOPHILS RELATIVE PERCENT: 0.7 %
BENZODIAZEPINE SCREEN, URINE: ABNORMAL
BILIRUB SERPL-MCNC: 0.3 MG/DL (ref 0–1)
BILIRUBIN DIRECT: <0.2 MG/DL (ref 0–0.3)
BILIRUBIN URINE: NEGATIVE
BILIRUBIN, INDIRECT: NORMAL MG/DL (ref 0–1)
BLOOD, URINE: NEGATIVE
BUN BLDV-MCNC: 9 MG/DL (ref 7–20)
CALCIUM SERPL-MCNC: 10.1 MG/DL (ref 8.3–10.6)
CANNABINOID SCREEN URINE: POSITIVE
CARBOXYHEMOGLOBIN: 8.2 % (ref 0–1.5)
CHLORIDE BLD-SCNC: 104 MMOL/L (ref 99–110)
CLARITY: ABNORMAL
CO2: 22 MMOL/L (ref 21–32)
COCAINE METABOLITE SCREEN URINE: ABNORMAL
COLOR: YELLOW
CREAT SERPL-MCNC: 0.6 MG/DL (ref 0.6–1.1)
EOSINOPHILS ABSOLUTE: 0.1 K/UL (ref 0–0.6)
EOSINOPHILS RELATIVE PERCENT: 1.2 %
EPITHELIAL CELLS, UA: 9 /HPF (ref 0–5)
ETHANOL: 61 MG/DL (ref 0–0.08)
GFR AFRICAN AMERICAN: >60
GFR NON-AFRICAN AMERICAN: >60
GLOBULIN: 2.9 G/DL
GLUCOSE BLD-MCNC: 127 MG/DL (ref 70–99)
GLUCOSE URINE: NEGATIVE MG/DL
HCO3 VENOUS: 23.2 MMOL/L (ref 23–29)
HCT VFR BLD CALC: 44.8 % (ref 36–48)
HEMOGLOBIN: 15.2 G/DL (ref 12–16)
HYALINE CASTS: 2 /LPF (ref 0–8)
INR BLD: 0.99 (ref 0.86–1.14)
KETONES, URINE: ABNORMAL MG/DL
LACTIC ACID, SEPSIS: 2.7 MMOL/L (ref 0.4–1.9)
LEUKOCYTE ESTERASE, URINE: NEGATIVE
LIPASE: 32 U/L (ref 13–60)
LYMPHOCYTES ABSOLUTE: 2.2 K/UL (ref 1–5.1)
LYMPHOCYTES RELATIVE PERCENT: 30.3 %
Lab: ABNORMAL
MAGNESIUM: 1.8 MG/DL (ref 1.8–2.4)
MCH RBC QN AUTO: 33.7 PG (ref 26–34)
MCHC RBC AUTO-ENTMCNC: 34 G/DL (ref 31–36)
MCV RBC AUTO: 99.3 FL (ref 80–100)
METHADONE SCREEN, URINE: ABNORMAL
METHEMOGLOBIN VENOUS: 0.3 %
MICROSCOPIC EXAMINATION: YES
MONOCYTES ABSOLUTE: 0.4 K/UL (ref 0–1.3)
MONOCYTES RELATIVE PERCENT: 5.5 %
NEUTROPHILS ABSOLUTE: 4.6 K/UL (ref 1.7–7.7)
NEUTROPHILS RELATIVE PERCENT: 62.3 %
NITRITE, URINE: NEGATIVE
O2 CONTENT, VEN: 20 VOL %
O2 SAT, VEN: 100 %
O2 THERAPY: ABNORMAL
OPIATE SCREEN URINE: ABNORMAL
OXYCODONE URINE: ABNORMAL
PCO2, VEN: 36.1 MMHG (ref 40–50)
PDW BLD-RTO: 13.7 % (ref 12.4–15.4)
PH UA: 5.5
PH UA: 5.5 (ref 5–8)
PH VENOUS: 7.42 (ref 7.35–7.45)
PHENCYCLIDINE SCREEN URINE: ABNORMAL
PLATELET # BLD: 300 K/UL (ref 135–450)
PMV BLD AUTO: 8.3 FL (ref 5–10.5)
PO2, VEN: 117 MMHG (ref 25–40)
POTASSIUM REFLEX MAGNESIUM: 3.5 MMOL/L (ref 3.5–5.1)
PROPOXYPHENE SCREEN: ABNORMAL
PROTEIN UA: NEGATIVE MG/DL
PROTHROMBIN TIME: 11.5 SEC (ref 10–13.2)
RBC # BLD: 4.52 M/UL (ref 4–5.2)
RBC UA: 1 /HPF (ref 0–4)
SALICYLATE, SERUM: <0.3 MG/DL (ref 15–30)
SARS-COV-2, NAAT: NOT DETECTED
SODIUM BLD-SCNC: 141 MMOL/L (ref 136–145)
SPECIFIC GRAVITY UA: 1.01 (ref 1–1.03)
TCO2 CALC VENOUS: 54 MMOL/L
TOTAL PROTEIN: 7.2 G/DL (ref 6.4–8.2)
TROPONIN: <0.01 NG/ML
URINE REFLEX TO CULTURE: ABNORMAL
URINE TYPE: ABNORMAL
UROBILINOGEN, URINE: 0.2 E.U./DL
WBC # BLD: 7.4 K/UL (ref 4–11)
WBC UA: 7 /HPF (ref 0–5)

## 2021-01-17 PROCEDURE — 80307 DRUG TEST PRSMV CHEM ANLYZR: CPT

## 2021-01-17 PROCEDURE — 74177 CT ABD & PELVIS W/CONTRAST: CPT

## 2021-01-17 PROCEDURE — G0480 DRUG TEST DEF 1-7 CLASSES: HCPCS

## 2021-01-17 PROCEDURE — 96374 THER/PROPH/DIAG INJ IV PUSH: CPT

## 2021-01-17 PROCEDURE — 93005 ELECTROCARDIOGRAM TRACING: CPT | Performed by: PHYSICIAN ASSISTANT

## 2021-01-17 PROCEDURE — 83690 ASSAY OF LIPASE: CPT

## 2021-01-17 PROCEDURE — 83605 ASSAY OF LACTIC ACID: CPT

## 2021-01-17 PROCEDURE — 82803 BLOOD GASES ANY COMBINATION: CPT

## 2021-01-17 PROCEDURE — 85610 PROTHROMBIN TIME: CPT

## 2021-01-17 PROCEDURE — 2580000003 HC RX 258: Performed by: PHYSICIAN ASSISTANT

## 2021-01-17 PROCEDURE — 6360000002 HC RX W HCPCS: Performed by: PHYSICIAN ASSISTANT

## 2021-01-17 PROCEDURE — 85025 COMPLETE CBC W/AUTO DIFF WBC: CPT

## 2021-01-17 PROCEDURE — 2500000003 HC RX 250 WO HCPCS: Performed by: PHYSICIAN ASSISTANT

## 2021-01-17 PROCEDURE — 81001 URINALYSIS AUTO W/SCOPE: CPT

## 2021-01-17 PROCEDURE — 6370000000 HC RX 637 (ALT 250 FOR IP): Performed by: STUDENT IN AN ORGANIZED HEALTH CARE EDUCATION/TRAINING PROGRAM

## 2021-01-17 PROCEDURE — 84484 ASSAY OF TROPONIN QUANT: CPT

## 2021-01-17 PROCEDURE — 87040 BLOOD CULTURE FOR BACTERIA: CPT

## 2021-01-17 PROCEDURE — 6360000004 HC RX CONTRAST MEDICATION: Performed by: STUDENT IN AN ORGANIZED HEALTH CARE EDUCATION/TRAINING PROGRAM

## 2021-01-17 PROCEDURE — 96372 THER/PROPH/DIAG INJ SC/IM: CPT

## 2021-01-17 PROCEDURE — 80053 COMPREHEN METABOLIC PANEL: CPT

## 2021-01-17 PROCEDURE — 83735 ASSAY OF MAGNESIUM: CPT

## 2021-01-17 PROCEDURE — 99285 EMERGENCY DEPT VISIT HI MDM: CPT

## 2021-01-17 PROCEDURE — U0002 COVID-19 LAB TEST NON-CDC: HCPCS

## 2021-01-17 RX ORDER — 0.9 % SODIUM CHLORIDE 0.9 %
500 INTRAVENOUS SOLUTION INTRAVENOUS ONCE
Status: COMPLETED | OUTPATIENT
Start: 2021-01-17 | End: 2021-01-17

## 2021-01-17 RX ORDER — DICYCLOMINE HYDROCHLORIDE 10 MG/ML
20 INJECTION INTRAMUSCULAR ONCE
Status: COMPLETED | OUTPATIENT
Start: 2021-01-17 | End: 2021-01-17

## 2021-01-17 RX ORDER — SODIUM CHLORIDE 9 MG/ML
INJECTION, SOLUTION INTRAVENOUS CONTINUOUS
Status: DISCONTINUED | OUTPATIENT
Start: 2021-01-17 | End: 2021-01-18 | Stop reason: HOSPADM

## 2021-01-17 RX ORDER — QUETIAPINE FUMARATE 25 MG/1
25 TABLET, FILM COATED ORAL ONCE
Status: COMPLETED | OUTPATIENT
Start: 2021-01-17 | End: 2021-01-17

## 2021-01-17 RX ORDER — ONDANSETRON 2 MG/ML
4 INJECTION INTRAMUSCULAR; INTRAVENOUS ONCE
Status: COMPLETED | OUTPATIENT
Start: 2021-01-17 | End: 2021-01-17

## 2021-01-17 RX ADMIN — SODIUM CHLORIDE 500 ML: 9 INJECTION, SOLUTION INTRAVENOUS at 17:38

## 2021-01-17 RX ADMIN — QUETIAPINE FUMARATE 25 MG: 25 TABLET ORAL at 21:00

## 2021-01-17 RX ADMIN — SODIUM CHLORIDE: 9 INJECTION, SOLUTION INTRAVENOUS at 17:38

## 2021-01-17 RX ADMIN — IOPAMIDOL 75 ML: 755 INJECTION, SOLUTION INTRAVENOUS at 16:50

## 2021-01-17 RX ADMIN — DICYCLOMINE HYDROCHLORIDE 20 MG: 20 INJECTION INTRAMUSCULAR at 16:21

## 2021-01-17 RX ADMIN — FAMOTIDINE 20 MG: 10 INJECTION, SOLUTION INTRAVENOUS at 16:21

## 2021-01-17 RX ADMIN — ONDANSETRON 4 MG: 2 INJECTION INTRAMUSCULAR; INTRAVENOUS at 16:21

## 2021-01-17 ASSESSMENT — PAIN DESCRIPTION - LOCATION: LOCATION: ABDOMEN

## 2021-01-17 ASSESSMENT — ENCOUNTER SYMPTOMS
SHORTNESS OF BREATH: 0
COLOR CHANGE: 0
DIARRHEA: 0
NAUSEA: 0
CHEST TIGHTNESS: 0
ABDOMINAL PAIN: 1
VOMITING: 0

## 2021-01-17 ASSESSMENT — PAIN DESCRIPTION - DESCRIPTORS: DESCRIPTORS: DISCOMFORT

## 2021-01-17 ASSESSMENT — PAIN DESCRIPTION - FREQUENCY: FREQUENCY: CONTINUOUS

## 2021-01-17 ASSESSMENT — PAIN DESCRIPTION - PAIN TYPE: TYPE: ACUTE PAIN
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
## 2021-01-17 NOTE — ED NOTES
Bed: 28  Expected date:   Expected time:   Means of arrival: [facility]  Comments:  squad Sherren Buddle, RN  01/17/21 9154

## 2021-01-17 NOTE — ED NOTES
Psychiatric protocol initiated at this time. Safety precautions in place per psychiatric protocol. Pt undressed and put into gown, belongings removed from room and labeled with pt information. All disinfectants removed, monitor cords removed, phone/phone cord removed, and belongings bags removed. Room check for any potentially hazardous items, all unnecessary items and equipment removed. Patient will be served with plastic eating utensils, all drinks will be placed in styrofoam cups,   at bedside for pt observation and safety. Will continue to monitor every 15 minutes per protocol. SUNDAR Cedeno aware.      Zoey Gupta RN  01/17/21 5736

## 2021-01-17 NOTE — ED NOTES
This writing RN received report from [facility], [address] [place] [address]. Room made safe at this time,  at bedside.       Abril Joseph RN  83/38/48 6883

## 2021-01-17 NOTE — ED NOTES
Pt states she does not intent to tell anyone about her plan to end her life. She just wants to do it. Pt states she plans to use a knife to end her life.      Roger Cuevas RN  01/17/21 2808

## 2021-01-17 NOTE — ED NOTES
Pt medicated per eMAR. Pt expressed to this writing RN that she lost her father on May, 25, 2020 and then lost her mother on Oct. 31,2020. States she has been feeling depressed since then and feels it is too much to handle. States the anxiety from this has caused her to have abdominal pain. Sees psychiatrist at [facility]. Pt reports that she has been drinking alcohol and using percocet to help mend the pain. Very tearful during conversation.       Samm Veliz RN  05/76/52 7966

## 2021-01-17 NOTE — ED NOTES
Bed: 05  Expected date:   Expected time:   Means of arrival:   Comments:  Bed [address], RN  01/17/21 7850
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
## 2021-01-18 VITALS
RESPIRATION RATE: 21 BRPM | BODY MASS INDEX: 31.24 KG/M2 | SYSTOLIC BLOOD PRESSURE: 175 MMHG | WEIGHT: 182.98 LBS | HEART RATE: 61 BPM | OXYGEN SATURATION: 97 % | HEIGHT: 64 IN | DIASTOLIC BLOOD PRESSURE: 85 MMHG | TEMPERATURE: 99.4 F

## 2021-01-18 LAB
EKG ATRIAL RATE: 82 BPM
EKG DIAGNOSIS: NORMAL
EKG P AXIS: 30 DEGREES
EKG P-R INTERVAL: 136 MS
EKG Q-T INTERVAL: 394 MS
EKG QRS DURATION: 82 MS
EKG QTC CALCULATION (BAZETT): 460 MS
EKG R AXIS: 39 DEGREES
EKG T AXIS: 31 DEGREES
EKG VENTRICULAR RATE: 82 BPM

## 2021-01-18 PROCEDURE — 93010 ELECTROCARDIOGRAM REPORT: CPT | Performed by: INTERNAL MEDICINE

## 2021-01-18 NOTE — ED NOTES
Patient given nightly medication as requested, patient calm and cooperative,  at the bedside     Justyn Hamilton RN  01/17/21 8323

## 2021-01-18 NOTE — ED NOTES
Patient resting with eyes closed, no signs of distress,  on the bedside      Elliott Coronado RN  01/18/21 8413

## 2021-01-18 NOTE — ED NOTES
Patient awake and updated on transfer and plan of care, patient cooperative and calm,  at the bedside      Daniel Last RN  01/18/21 9336

## 2021-01-18 NOTE — ED PROVIDER NOTES
This patient was checked out to me by Dr. Paddy Desai. Ligament walks all night with no problems at all. I signed the transfer form and the patient has placement.      Emiliano Serrano MD  01/18/21 4038

## 2021-01-18 NOTE — ED NOTES
Patient resting with eyes closed, no signs of distress,  at the bedside      Susy Paiz RN  01/17/21 8936

## 2021-01-18 NOTE — ED NOTES
Report called to [facility].  Maryam Antonio RN verbalized understanding and denied any need for further information, patient awaiting transport     Fitz Holder RN  01/18/21 9803

## 2021-01-18 NOTE — ED NOTES
Patient accepted at [facility] room 0710 by Dr. Paty Mistry.   Number for nurse to nurse is [phone]  01/17/21 6434

## 2021-01-21 LAB
BLOOD CULTURE, ROUTINE: NORMAL
CULTURE, BLOOD 2: NORMAL

## 2021-07-16 ENCOUNTER — APPOINTMENT (OUTPATIENT)
Dept: CT IMAGING | Age: 56
End: 2021-07-16

## 2021-07-16 ENCOUNTER — HOSPITAL ENCOUNTER (EMERGENCY)
Age: 56
Discharge: PSYCHIATRIC OR SUBSTANCE USE FACILITY | End: 2021-07-17
Attending: EMERGENCY MEDICINE

## 2021-07-16 DIAGNOSIS — Z72.89 DELIBERATE SELF-CUTTING: ICD-10-CM

## 2021-07-16 DIAGNOSIS — S41.112A LACERATION OF ARM, LEFT, MULTIPLE SITES, INITIAL ENCOUNTER: ICD-10-CM

## 2021-07-16 DIAGNOSIS — N94.9 ADNEXAL CYST: ICD-10-CM

## 2021-07-16 DIAGNOSIS — Z91.89 AT HIGH RISK FOR SELF HARM: ICD-10-CM

## 2021-07-16 DIAGNOSIS — R45.851 DEPRESSION WITH SUICIDAL IDEATION: Primary | ICD-10-CM

## 2021-07-16 DIAGNOSIS — F32.A DEPRESSION WITH SUICIDAL IDEATION: Primary | ICD-10-CM

## 2021-07-16 LAB
ACETAMINOPHEN LEVEL: <5 UG/ML (ref 10–30)
AMPHETAMINE SCREEN, URINE: ABNORMAL
ANION GAP SERPL CALCULATED.3IONS-SCNC: 12 MMOL/L (ref 3–16)
BARBITURATE SCREEN URINE: ABNORMAL
BASOPHILS ABSOLUTE: 0.1 K/UL (ref 0–0.2)
BASOPHILS RELATIVE PERCENT: 0.6 %
BENZODIAZEPINE SCREEN, URINE: ABNORMAL
BILIRUBIN URINE: NEGATIVE
BLOOD, URINE: NEGATIVE
BUN BLDV-MCNC: 7 MG/DL (ref 7–20)
CALCIUM SERPL-MCNC: 9.8 MG/DL (ref 8.3–10.6)
CANNABINOID SCREEN URINE: POSITIVE
CHLORIDE BLD-SCNC: 103 MMOL/L (ref 99–110)
CLARITY: CLEAR
CO2: 24 MMOL/L (ref 21–32)
COCAINE METABOLITE SCREEN URINE: POSITIVE
COLOR: YELLOW
CREAT SERPL-MCNC: 0.6 MG/DL (ref 0.6–1.1)
EOSINOPHILS ABSOLUTE: 0.1 K/UL (ref 0–0.6)
EOSINOPHILS RELATIVE PERCENT: 1.2 %
ETHANOL: 127 MG/DL (ref 0–0.08)
ETHANOL: 75 MG/DL (ref 0–0.08)
GFR AFRICAN AMERICAN: >60
GFR NON-AFRICAN AMERICAN: >60
GLUCOSE BLD-MCNC: 86 MG/DL (ref 70–99)
GLUCOSE URINE: NEGATIVE MG/DL
HCT VFR BLD CALC: 43.6 % (ref 36–48)
HEMOGLOBIN: 15.1 G/DL (ref 12–16)
KETONES, URINE: NEGATIVE MG/DL
LEUKOCYTE ESTERASE, URINE: NEGATIVE
LIPASE: 25 U/L (ref 13–60)
LYMPHOCYTES ABSOLUTE: 2.9 K/UL (ref 1–5.1)
LYMPHOCYTES RELATIVE PERCENT: 34.8 %
Lab: ABNORMAL
MAGNESIUM: 1.8 MG/DL (ref 1.8–2.4)
MCH RBC QN AUTO: 35.4 PG (ref 26–34)
MCHC RBC AUTO-ENTMCNC: 34.7 G/DL (ref 31–36)
MCV RBC AUTO: 102 FL (ref 80–100)
METHADONE SCREEN, URINE: ABNORMAL
MICROSCOPIC EXAMINATION: NORMAL
MONOCYTES ABSOLUTE: 0.5 K/UL (ref 0–1.3)
MONOCYTES RELATIVE PERCENT: 6.4 %
NEUTROPHILS ABSOLUTE: 4.8 K/UL (ref 1.7–7.7)
NEUTROPHILS RELATIVE PERCENT: 57 %
NITRITE, URINE: NEGATIVE
OPIATE SCREEN URINE: ABNORMAL
OXYCODONE URINE: ABNORMAL
PDW BLD-RTO: 13.1 % (ref 12.4–15.4)
PH UA: 6
PH UA: 6.5 (ref 5–8)
PHENCYCLIDINE SCREEN URINE: ABNORMAL
PLATELET # BLD: 325 K/UL (ref 135–450)
PMV BLD AUTO: 8.4 FL (ref 5–10.5)
POTASSIUM REFLEX MAGNESIUM: 3.4 MMOL/L (ref 3.5–5.1)
PROPOXYPHENE SCREEN: ABNORMAL
PROTEIN UA: NEGATIVE MG/DL
RBC # BLD: 4.28 M/UL (ref 4–5.2)
SALICYLATE, SERUM: <0.3 MG/DL (ref 15–30)
SARS-COV-2, NAAT: NOT DETECTED
SODIUM BLD-SCNC: 139 MMOL/L (ref 136–145)
SPECIFIC GRAVITY UA: 1 (ref 1–1.03)
URINE REFLEX TO CULTURE: NORMAL
URINE TYPE: NORMAL
UROBILINOGEN, URINE: 0.2 E.U./DL
WBC # BLD: 8.5 K/UL (ref 4–11)

## 2021-07-16 PROCEDURE — 80307 DRUG TEST PRSMV CHEM ANLYZR: CPT

## 2021-07-16 PROCEDURE — 93005 ELECTROCARDIOGRAM TRACING: CPT | Performed by: EMERGENCY MEDICINE

## 2021-07-16 PROCEDURE — 82077 ASSAY SPEC XCP UR&BREATH IA: CPT

## 2021-07-16 PROCEDURE — 80048 BASIC METABOLIC PNL TOTAL CA: CPT

## 2021-07-16 PROCEDURE — 80179 DRUG ASSAY SALICYLATE: CPT

## 2021-07-16 PROCEDURE — 74177 CT ABD & PELVIS W/CONTRAST: CPT

## 2021-07-16 PROCEDURE — 81003 URINALYSIS AUTO W/O SCOPE: CPT

## 2021-07-16 PROCEDURE — 83690 ASSAY OF LIPASE: CPT

## 2021-07-16 PROCEDURE — 87635 SARS-COV-2 COVID-19 AMP PRB: CPT

## 2021-07-16 PROCEDURE — 6360000004 HC RX CONTRAST MEDICATION: Performed by: EMERGENCY MEDICINE

## 2021-07-16 PROCEDURE — 36415 COLL VENOUS BLD VENIPUNCTURE: CPT

## 2021-07-16 PROCEDURE — 6370000000 HC RX 637 (ALT 250 FOR IP): Performed by: PHYSICIAN ASSISTANT

## 2021-07-16 PROCEDURE — 83735 ASSAY OF MAGNESIUM: CPT

## 2021-07-16 PROCEDURE — 80143 DRUG ASSAY ACETAMINOPHEN: CPT

## 2021-07-16 PROCEDURE — 85025 COMPLETE CBC W/AUTO DIFF WBC: CPT

## 2021-07-16 PROCEDURE — 99285 EMERGENCY DEPT VISIT HI MDM: CPT

## 2021-07-16 RX ORDER — ONDANSETRON 4 MG/1
8 TABLET, ORALLY DISINTEGRATING ORAL ONCE
Status: COMPLETED | OUTPATIENT
Start: 2021-07-16 | End: 2021-07-16

## 2021-07-16 RX ORDER — ACETAMINOPHEN 325 MG/1
650 TABLET ORAL ONCE
Status: COMPLETED | OUTPATIENT
Start: 2021-07-16 | End: 2021-07-16

## 2021-07-16 RX ADMIN — IOPAMIDOL 75 ML: 755 INJECTION, SOLUTION INTRAVENOUS at 17:58

## 2021-07-16 RX ADMIN — ACETAMINOPHEN 650 MG: 325 TABLET ORAL at 19:25

## 2021-07-16 RX ADMIN — ONDANSETRON 8 MG: 4 TABLET, ORALLY DISINTEGRATING ORAL at 17:21

## 2021-07-16 ASSESSMENT — PAIN SCALES - GENERAL: PAINLEVEL_OUTOF10: 7

## 2021-07-16 ASSESSMENT — ENCOUNTER SYMPTOMS
EYE PAIN: 0
COUGH: 0
BACK PAIN: 0
ABDOMINAL PAIN: 0
DIARRHEA: 0
VOMITING: 0
SHORTNESS OF BREATH: 0
NAUSEA: 0

## 2021-07-16 NOTE — ED PROVIDER NOTES
Attending Note:    The patient was seen and examined by the mid-level provider. I also completed a face-to-face examination. HPI: Ambika Jeffers is a 48 y.o. female who presents to the emergency department with a complaint of depression and suicidal thoughts. She states that she has a history of depression. She was previously taking antidepressants but has been off of her medication for a couple of weeks. She states that things have gotten really bad over the past couple of days. She has had cutting behavior in the past.  She admits to cutting her abdominal area a couple of days ago and cutting her left arm today. She states that she does this to relieve the pain. However, she called 911 today because she felt scared and was afraid that she would \"do something more\". She does admit to at least a couple of previous suicide attempts in regard to cutting. She does not currently see a therapist or counselor. She denies any homicidal ideations. She denies any auditory or visual loose Nations. However, she states that she does hear her own voice in her head that tells her \"you are not good enough, you are stupid, you might as well not be here\". She admits to drinking alcohol today. She admits to smoking marijuana. She denies any cocaine use. She does report that she felt funny after smoking marijuana today. She denies ingestion of any other medications chemicals or substances. She does report some mild epigastric abdominal cramping over the last couple of days. She denies any severe pain. No nausea vomiting. No diarrhea. She denies any dysuria hematuria frequency urgency. She denies any chest pain heaviness pressure tightness. No shortness of breath. No melena or hematochezia. Physical Exam:     Constitutional: No apparent distress. Head: No visible evidence of trauma. Normocephalic. Eyes: Pupils equal and reactive. No photophobia.   Conjunctiva normal.    HENT: Oral mucosa moist.  Airway patent. Neck:  Soft and supple. Nontender. Heart:  Regular rate and rhythm. No murmur. Lungs:  Clear to auscultation. No wheezes, rales, or ronchi. No conversational dyspnea or accessory muscle use. Abdomen:  Soft, non-distended. Mild epigastric tenderness. No guarding or rebound. No guarding rigidity or rebound. Musculoskeletal: Extremities non-tender with full range of motion. Radial and dorsalis pedis pulses were equal laterally. No calf tenderness erythema or edema. Neurological: Alert and oriented x 3. Speech clear. No acute focal motor or sensory deficits. Skin: Skin is warm and dry. No rash. Self-inflicted superficial linear abrasions noted on the anterior abdominal wall, 3 or 4 separate abrasions. Self-inflicted superficial linear abrasions noted on the left volar forearm as well. None of these required wound repair. No evidence of secondary infection. No surrounding erythema or induration. No stab wounds. Psychiatric: Tearful. Mood is depressed. Affect is flat. Poor eye contact. Judgment and mentation are intact. DIAGNOSTIC RESULTS     EKG: All EKG's are interpreted by the Emergency Department Physician who either signs or Co-signs this chart in the absence of a cardiologist.    Normal sinus rhythm. Rate 75. MT interval 144 ms. QRS duration 88 ms. QTc 477 ms. R axis 53 degrees. No ST elevation. EKG is unchanged from January 17, 2021. RADIOLOGY:   Non-plain film images such as CT, Ultrasound and MRI are read by the radiologist. Plain radiographic images are visualized and preliminarily interpreted by the emergency physician with the below findings:        Interpretation per the Radiologist below, if available at the time of this note:    CT ABDOMEN PELVIS W IV CONTRAST Additional Contrast? None   Preliminary Result   1. No acute findings within the abdomen or pelvis. Colonic diverticulosis   with no acute features. No evidence of appendicitis. 2. Complex septated cystic lesion in the left adnexa measuring 2.5 x 3.9 cm. Ultrasound follow-up is recommended. 3. Hepatic steatosis.                ED BEDSIDE ULTRASOUND:   Performed by ED Physician - none    LABS:  Labs Reviewed   CBC WITH AUTO DIFFERENTIAL - Abnormal; Notable for the following components:       Result Value    .0 (*)     MCH 35.4 (*)     All other components within normal limits    Narrative:     Performed at:  [address] [place] [address]   Phone [phone]   BASIC METABOLIC PANEL W/ REFLEX TO MG FOR LOW K - Abnormal; Notable for the following components:    Potassium reflex Magnesium 3.4 (*)     All other components within normal limits    Narrative:     Performed at:  [address] [employer] [address]   Phone [phone]   [address] - Abnormal; Notable for the following components:    Cannabinoid Scrn, Ur POSITIVE (*)     Cocaine Metabolite Screen, Urine POSITIVE (*)     All other components within normal limits    Narrative:     Performed at:  [address] [employer] [address]   Phone [phone]   ACETAMINOPHEN LEVEL - Abnormal; Notable for the following components:    Acetaminophen Level <5 (*)     All other components within normal limits    Narrative:     Performed at:  [address] [employer] [address]   Phone [phone]   SALICYLATE LEVEL - Abnormal; Notable for the following components:    Salicylate, Serum <3.1 (*)     All other components within normal limits    Narrative:     Performed at:  [address] [employer] [address]   Phone [phone], RAPID    Narrative:     Performed at:  [address] [place] [address] 5:32 PM is 127. Alcohol will be repeated and the patient will be reassessed when she is clinically sober. 6:47 PM: CT abdomen pelvis reveals no acute findings. She will need nonemergent follow-up regarding complex cyst in the left adnexa. There is no pelvic pain or tenderness. This is an incidental finding. As was discussed with the patient and was advised to follow-up with her primary care physician after she is released from the hospital to schedule an outpatient ultrasound of the pelvis as soon as possible. 8:26 PM: Patient is fully awake and alert. Repeat alcohol at 7:52 PM is 75. The patient is medically clear. She still verbalizes fear that she is going to harm herself. She was placed on a 72-hour hold. 72-hour hold form was completed and signed. The patient does represent a danger to herself and will benefit from psychiatric evaluation. A call was placed to the psychiatrist on-call for admission to a psychiatric facility. 8:55 PM: The patient was reviewed by Dr. Sharri Crawford the psychiatrist on-call at [facility]. She request for the patient to be sent to the [facility] in the emergency department at [facility] for further evaluation by the psychiatric social worker prior to admission. I spoke with Dr. Gem Choudhury in the emergency department at [facility] who agrees to accept the patient for transfer. She will be transferred by BLS ambulance. MDM      CRITICAL CARE TIME   Total Critical Care time was 0 minutes, excluding separately reportable procedures. There was a high probability of clinically significant/life threatening deterioration in the patient's condition which required my urgent intervention. CONSULTS:  IP CONSULT TO PSYCHIATRY    PROCEDURES:  Unless otherwise noted below, none     Procedures        FINAL IMPRESSION      1. Depression with suicidal ideation    2. Adnexal cyst on abdominal CT    3. At high risk for self harm    4.  Laceration of arm, left, multiple sites, initial encounter    5. Deliberate self-cutting          DISPOSITION/PLAN   DISPOSITION        PATIENT REFERRED TO:  MITCHELL Laguna NP  [address] [address]  [phone]      ED follow up    Alisa Monique, [address] [facility]  [place] [address]  [phone]      follow up with GYN regarding adnexal cyst on abdominal CT      DISCHARGE MEDICATIONS:  New Prescriptions    No medications on file     Controlled Substances Monitoring:     No flowsheet data found. (Please note that portions of this note were completed with a voice recognition program.  Efforts were made to edit the dictations but occasionally words are mis-transcribed. )    [address] DO (electronically signed)  Attending Emergency Physician      Ivan Mason DO  07/16/21 2057

## 2021-07-16 NOTE — ED PROVIDER NOTES
[address] [place] [address]        Pt Name: Kendrick Motta  MRN: [id number]  [place] 1965  Date of evaluation: 7/16/2021  Provider: SUNDAR Soriano  PCP: MITCHELL Turk NP  Note Started: 5:21 PM EDT        I have seen and evaluated this patient with my supervising physician Hari Sanderson, [address] [place] [address]       Chief Complaint   Patient presents with    Psychiatric Evaluation     pt states thoughts of self harm today. pt has been cutting her arms superficially         HISTORY OF PRESENT ILLNESS   (Location, Timing/Onset, Context/Setting, Quality, Duration, Modifying Factors, Severity, Associated Signs and Symptoms)  Note limiting factors. Chief Complaint: depression     Kendrick Motta is a 48 y.o. female who presents to the emergency department for evaluation of depression. Patient tells me that she has been thinking of harming herself and has been cutting herself on her arms and her stomach. She reports cutting because it's the only way to release her pain. She states that she is going through a lot of stress. She was off her duloxetine for at least 2 weeks and just started back on it a few days ago. She denies any actual plan to commit suicide but is worried that she will harm herself if she is discharged from the ER today. She feels that nobody would care if she were dead and states \"I just do not care anymore. I am done. \". She drinks 4 glasses of whiskey with juice, her most recent drink was approximately 3 hours prior to arrival.  She also admits smoking marijuana earlier today. She does report a history of suicidal thoughts, self-harm and was most recently admitted to inpatient facility in January earlier this year. She denies auditory or visual hallucinations. She denies other illicit drug use. She lives home alone and does not feel that she has any good support system.     Nursing Notes were all reviewed and agreed with or any disagreements were addressed in the HPI. REVIEW OF SYSTEMS    (2-9 systems for level 4, 10 or more for level 5)     Review of Systems   Constitutional: Negative for chills, fatigue and fever. Eyes: Negative for pain. Respiratory: Negative for cough and shortness of breath. Cardiovascular: Negative for chest pain. Gastrointestinal: Negative for abdominal pain, diarrhea, nausea and vomiting. Genitourinary: Negative for dysuria. Musculoskeletal: Negative for back pain, neck pain and neck stiffness. Skin: Negative for rash. Neurological: Negative for dizziness and headaches. Psychiatric/Behavioral: Positive for dysphoric mood. Negative for confusion and suicidal ideas. Positives and Pertinent negatives as per HPI. Except as noted above in the ROS, all other systems were reviewed and negative.        PAST MEDICAL HISTORY     Past Medical History:   Diagnosis Date    Alcohol abuse     Depression     Diverticulosis     DOMINICK (generalized anxiety disorder)     GERD (gastroesophageal reflux disease)     Kidney stone     Tobacco abuse          SURGICAL HISTORY     Past Surgical History:   Procedure Laterality Date    COLONOSCOPY  2006    HYSTERECTOMY  2008         CURRENTMEDICATIONS       Previous Medications    CLONIDINE (CATAPRES) 0.1 MG TABLET    Take 1 tablet by mouth 2 times daily    DICYCLOMINE (BENTYL) 20 MG TABLET    Take 1 tablet by mouth every 6 hours as needed (Abdominal Cramping)    DULOXETINE (CYMBALTA) 60 MG EXTENDED RELEASE CAPSULE    Take 1 capsule by mouth daily    DULOXETINE 40 MG CPEP    Take 40 mg by mouth Daily with lunch    MULTIPLE VITAMIN (MULTIVITAMIN) TABLET    Take 1 tablet by mouth daily    NICOTINE (NICODERM CQ) 14 MG/24HR    Place 1 patch onto the skin daily    QUETIAPINE (SEROQUEL) 25 MG TABLET    Take 1 tablet by mouth nightly    QUETIAPINE (SEROQUEL) 25 MG TABLET    Take 0.5 tablets by mouth every 8 hours as needed for Other (anxiety)    VITAMIN B-1 100 MG TABLET    Take 1 tablet by mouth daily    VITAMIN D (CHOLECALCIFEROL) 1000 UNIT TABS TABLET    Take 1 tablet by mouth daily         ALLERGIES     Bupropion, Macrobid [nitrofurantoin macrocrystal], Morphine and related, and Varenicline    FAMILYHISTORY       Family History   Problem Relation Age of Onset    Diabetes Mother     High Cholesterol Mother     High Blood Pressure Mother     Diabetes Father     High Cholesterol Father     Cancer Father           SOCIAL HISTORY       Social History     Tobacco Use    Smoking status: Current Every Day Smoker     Packs/day: 0.25     Types: Cigarettes    Smokeless tobacco: Never Used   Vaping Use    Vaping Use: Never used   Substance Use Topics    Alcohol use: Yes     Alcohol/week: 42.0 standard drinks     Types: 42 Cans of beer per week     Comment: 3-4 alcohol     Drug use: Yes     Types: Marijuana     Comment: occ       SCREENINGS             PHYSICAL EXAM    (up to 7 for level 4, 8 or more for level 5)     ED Triage Vitals [07/16/21 1553]   BP Temp Temp Source Pulse Resp SpO2 Height Weight   -- 98.4 °F (36.9 °C) Oral -- -- -- -- --       Physical Exam  Vitals and nursing note reviewed. Constitutional:       General: She is not in acute distress. Appearance: She is well-developed. She is not diaphoretic. HENT:      Head: Normocephalic and atraumatic. Eyes:      General:         Right eye: No discharge. Left eye: No discharge. Pulmonary:      Effort: No respiratory distress. Breath sounds: No stridor. Musculoskeletal:         General: Normal range of motion. Cervical back: Normal range of motion and neck supple. Skin:     General: Skin is warm and dry. Coloration: Skin is not pale. Neurological:      Mental Status: She is alert and oriented to person, place, and time. Comments: No gross facial drooping. Moves all 4 extremities spontaneously. Psychiatric:         Mood and Affect: Mood is depressed. Speech: Speech normal.         Behavior: Behavior normal.         DIAGNOSTIC RESULTS   LABS:    Labs Reviewed   CBC WITH AUTO DIFFERENTIAL - Abnormal; Notable for the following components:       Result Value    .0 (*)     MCH 35.4 (*)     All other components within normal limits    Narrative:     Performed at:  [facility]  [address] [facility] [address]   Phone [phone]   BASIC METABOLIC PANEL W/ REFLEX TO MG FOR LOW K - Abnormal; Notable for the following components:    Potassium reflex Magnesium 3.4 (*)     All other components within normal limits    Narrative:     Performed at:  [facility]  [address] [place] [address]   Phone [phone]   [address] - Abnormal; Notable for the following components:    Cannabinoid Scrn, Ur POSITIVE (*)     Cocaine Metabolite Screen, Urine POSITIVE (*)     All other components within normal limits    Narrative:     Performed at:  [facility]  [address] [facility] [facility] [address]   Phone [phone]   ACETAMINOPHEN LEVEL - Abnormal; Notable for the following components:    Acetaminophen Level <5 (*)     All other components within normal limits    Narrative:     Performed at:  [facility]  [address] [facility] [facility] [address]   Phone [phone]   SALICYLATE LEVEL - Abnormal; Notable for the following components:    Salicylate, Serum <7.7 (*)     All other components within normal limits    Narrative:     Performed at:  [facility]  [address] [facility] [address]   Phone [phone], RAPID    Narrative:     Performed at:  [facility]  [address] [place] [address]   Phone [phone] RAPID   URINE RT REFLEX TO CULTURE    Narrative:     Performed at:  [facility]  [address] [place] [address]   Phone [phone]   ETHANOL    Narrative:     Performed at:  [address] [facility]  [address] [place] [address]   Phone [phone]   MAGNESIUM    Narrative:     Performed at:  [address] [facility]  [address] [place] [address]   Phone [phone]   LIPASE    Narrative:     Performed at:  [address] [facility]  [address] [place] [address]   Phone [phone]   ETHANOL    Narrative:     Performed at:  [address] [facility]  [address] [place] [address]   Phone [phone]       When ordered only abnormal lab results are displayed. All other labs were within normal range or not returned as of this dictation. EKG: When ordered, EKG's are interpreted by the Emergency Department Physician in the absence of a cardiologist.  Please see their note for interpretation of EKG. RADIOLOGY:   Non-plain film images such as CT, Ultrasound and MRI are read by the radiologist. Plain radiographic images are visualized and preliminarily interpreted by the ED Provider with the below findings:        Interpretation per the Radiologist below, if available at the time of this note:    CT ABDOMEN PELVIS W IV CONTRAST Additional Contrast? None   Preliminary Result   1. No acute findings within the abdomen or pelvis. Colonic diverticulosis   with no acute features. No evidence of appendicitis. 2. Complex septated cystic lesion in the left adnexa measuring 2.5 x 3.9 cm. Ultrasound follow-up is recommended. 3. Hepatic steatosis. No results found.         PROCEDURES   Unless otherwise noted below, none     Procedures    CRITICAL CARE TIME   N/A    CONSULTS:  Rita 1343 and DIFFERENTIAL DIAGNOSIS/MDM:   Vitals:    Vitals:    07/16/21 1553 07/16/21 1853   BP:  134/78   Pulse:  87   Resp:  16   Temp: 98.4 °F (36.9 °C) 98.3 °F (36.8 °C)   TempSrc: Oral Oral   SpO2:  99%   Weight:  181 lb (82.1 kg)   Height:  5' 2.5\" (1.588 m)       Patient was given the following medications:  Medications   ondansetron (ZOFRAN-ODT) disintegrating tablet 8 mg (8 mg Oral Given 7/16/21 1721)   iopamidol (ISOVUE-370) 76 % injection 75 mL (75 mLs Intravenous Given 7/16/21 1758)   acetaminophen (TYLENOL) tablet 650 mg (650 mg Oral Given 7/16/21 1925)         Patient seen and examined today for depression. See HPI for patient presentation. Patient is hemodynamically stable, in no acute distress, nontoxic, afebrile, and without tachycardia, tachypnea, and hypoxia. Physical exam as above. Urine drug test was positive for marijuana and cocaine. Patient believes the marijuana she smoked today was laced with cocaine. Initial ethanol was 127, repeated 3 hours later to be 75. Did complain of some mild abdominal pain during her stay, she was given Tylenol. CT was negative for acute intra-abdominal process. She did have a cystic lesion in the left adnexa which will need to be followed up with gynecology, patient was provided with referral.  Patient is medically cleared. Given her self-harm, risk for further self-harm, history of suicidal ideations, I do feel that the patient is high risk of harm to her self and would benefit from inpatient psychiatric evaluation and treatment. He is medically cleared from this facility. Consult to psych is pending. At 9 PM my shift is ended, please see my attending physician's note for final disposition and plan. FINAL IMPRESSION      1. At high risk for self harm    2.  Adnexal cyst on abdominal CT          DISPOSITION/PLAN   DISPOSITION        PATIENT REFERRED TO:  Junie Soulier, APRN - NP  [address]  [phone]      ED follow up    Haley Echeverria MD  [address] [place] [address]  [phone]      follow up with GYN regarding adnexal cyst on abdominal CT      DISCHARGE MEDICATIONS:  New Prescriptions    No medications on file       DISCONTINUED MEDICATIONS:  Discontinued Medications    No medications on file              (Please note that portions of this note were completed with a voice recognition program.  Efforts were made to edit the dictations but occasionally words are mis-transcribed.)    SUNDAR Tucker (electronically signed)            SUNDAR Tucker  07/16/21 2028

## 2021-07-17 ENCOUNTER — HOSPITAL ENCOUNTER (INPATIENT)
Age: 56
LOS: 5 days | Discharge: HOME | DRG: 885 | End: 2021-07-22
Attending: EMERGENCY MEDICINE | Admitting: PSYCHIATRY & NEUROLOGY

## 2021-07-17 VITALS
SYSTOLIC BLOOD PRESSURE: 155 MMHG | TEMPERATURE: 98.3 F | HEIGHT: 63 IN | RESPIRATION RATE: 15 BRPM | OXYGEN SATURATION: 99 % | DIASTOLIC BLOOD PRESSURE: 110 MMHG | HEART RATE: 74 BPM | WEIGHT: 181 LBS | BODY MASS INDEX: 32.07 KG/M2

## 2021-07-17 DIAGNOSIS — N83.8 OVARIAN MASS, LEFT: Primary | ICD-10-CM

## 2021-07-17 PROBLEM — F32.A DEPRESSION: Status: ACTIVE | Noted: 2021-07-17

## 2021-07-17 LAB
EKG ATRIAL RATE: 75 BPM
EKG DIAGNOSIS: NORMAL
EKG P AXIS: 44 DEGREES
EKG P-R INTERVAL: 144 MS
EKG Q-T INTERVAL: 428 MS
EKG QRS DURATION: 88 MS
EKG QTC CALCULATION (BAZETT): 477 MS
EKG R AXIS: 53 DEGREES
EKG T AXIS: 42 DEGREES
EKG VENTRICULAR RATE: 75 BPM

## 2021-07-17 PROCEDURE — 6370000000 HC RX 637 (ALT 250 FOR IP): Performed by: STUDENT IN AN ORGANIZED HEALTH CARE EDUCATION/TRAINING PROGRAM

## 2021-07-17 PROCEDURE — 99284 EMERGENCY DEPT VISIT MOD MDM: CPT

## 2021-07-17 PROCEDURE — 93010 ELECTROCARDIOGRAM REPORT: CPT | Performed by: INTERNAL MEDICINE

## 2021-07-17 PROCEDURE — 6370000000 HC RX 637 (ALT 250 FOR IP): Performed by: PSYCHIATRY & NEUROLOGY

## 2021-07-17 PROCEDURE — 1240000000 HC EMOTIONAL WELLNESS R&B

## 2021-07-17 RX ORDER — AMLODIPINE BESYLATE 2.5 MG/1
2.5 TABLET ORAL DAILY
Status: ON HOLD | COMMUNITY
End: 2021-12-20 | Stop reason: SDUPTHER

## 2021-07-17 RX ORDER — LORAZEPAM 1 MG/1
1 TABLET ORAL ONCE
Status: COMPLETED | OUTPATIENT
Start: 2021-07-17 | End: 2021-07-17

## 2021-07-17 RX ORDER — TRAZODONE HYDROCHLORIDE 50 MG/1
50 TABLET ORAL NIGHTLY PRN
Status: DISCONTINUED | OUTPATIENT
Start: 2021-07-17 | End: 2021-07-22 | Stop reason: HOSPADM

## 2021-07-17 RX ORDER — IBUPROFEN 400 MG/1
400 TABLET ORAL EVERY 6 HOURS PRN
Status: DISCONTINUED | OUTPATIENT
Start: 2021-07-17 | End: 2021-07-22 | Stop reason: HOSPADM

## 2021-07-17 RX ORDER — OLANZAPINE 5 MG/1
5 TABLET ORAL EVERY 4 HOURS PRN
Status: DISCONTINUED | OUTPATIENT
Start: 2021-07-17 | End: 2021-07-22 | Stop reason: HOSPADM

## 2021-07-17 RX ORDER — OLANZAPINE 10 MG/1
10 INJECTION, POWDER, LYOPHILIZED, FOR SOLUTION INTRAMUSCULAR EVERY 4 HOURS PRN
Status: DISCONTINUED | OUTPATIENT
Start: 2021-07-17 | End: 2021-07-22 | Stop reason: HOSPADM

## 2021-07-17 RX ORDER — MAGNESIUM HYDROXIDE/ALUMINUM HYDROXICE/SIMETHICONE 120; 1200; 1200 MG/30ML; MG/30ML; MG/30ML
30 SUSPENSION ORAL EVERY 6 HOURS PRN
Status: DISCONTINUED | OUTPATIENT
Start: 2021-07-17 | End: 2021-07-22 | Stop reason: HOSPADM

## 2021-07-17 RX ORDER — HYDROXYZINE PAMOATE 25 MG/1
50 CAPSULE ORAL 3 TIMES DAILY PRN
Status: DISCONTINUED | OUTPATIENT
Start: 2021-07-17 | End: 2021-07-22 | Stop reason: HOSPADM

## 2021-07-17 RX ORDER — ACETAMINOPHEN 325 MG/1
650 TABLET ORAL EVERY 4 HOURS PRN
Status: DISCONTINUED | OUTPATIENT
Start: 2021-07-17 | End: 2021-07-22 | Stop reason: HOSPADM

## 2021-07-17 RX ORDER — DULOXETIN HYDROCHLORIDE 60 MG/1
60 CAPSULE, DELAYED RELEASE ORAL DAILY
Status: DISCONTINUED | OUTPATIENT
Start: 2021-07-17 | End: 2021-07-18

## 2021-07-17 RX ORDER — AMLODIPINE BESYLATE 2.5 MG/1
2.5 TABLET ORAL DAILY
Status: DISCONTINUED | OUTPATIENT
Start: 2021-07-17 | End: 2021-07-19

## 2021-07-17 RX ORDER — CLONIDINE HYDROCHLORIDE 0.1 MG/1
0.1 TABLET ORAL 2 TIMES DAILY
Status: DISCONTINUED | OUTPATIENT
Start: 2021-07-17 | End: 2021-07-22 | Stop reason: HOSPADM

## 2021-07-17 RX ADMIN — OLANZAPINE 5 MG: 5 TABLET, FILM COATED ORAL at 17:39

## 2021-07-17 RX ADMIN — DULOXETINE HYDROCHLORIDE 60 MG: 60 CAPSULE, DELAYED RELEASE ORAL at 15:49

## 2021-07-17 RX ADMIN — CLONIDINE HYDROCHLORIDE 0.1 MG: 0.1 TABLET ORAL at 15:49

## 2021-07-17 RX ADMIN — IBUPROFEN 400 MG: 400 TABLET, FILM COATED ORAL at 16:37

## 2021-07-17 RX ADMIN — CLONIDINE HYDROCHLORIDE 0.1 MG: 0.1 TABLET ORAL at 20:36

## 2021-07-17 RX ADMIN — AMLODIPINE BESYLATE 2.5 MG: 2.5 TABLET ORAL at 15:49

## 2021-07-17 RX ADMIN — LORAZEPAM 1 MG: 1 TABLET ORAL at 05:58

## 2021-07-17 ASSESSMENT — PAIN SCALES - GENERAL
PAINLEVEL_OUTOF10: 0
PAINLEVEL_OUTOF10: 3

## 2021-07-17 ASSESSMENT — SLEEP AND FATIGUE QUESTIONNAIRES
AVERAGE NUMBER OF SLEEP HOURS: 5
DO YOU HAVE DIFFICULTY SLEEPING: YES
DO YOU USE A SLEEP AID: YES
DIFFICULTY STAYING ASLEEP: YES
DIFFICULTY ARISING: YES
DIFFICULTY FALLING ASLEEP: NO
SLEEP PATTERN: DISTURBED/INTERRUPTED SLEEP;RESTLESSNESS;DIFFICULTY ARISING
RESTFUL SLEEP: NO

## 2021-07-17 ASSESSMENT — PATIENT HEALTH QUESTIONNAIRE - PHQ9: SUM OF ALL RESPONSES TO PHQ QUESTIONS 1-9: 15

## 2021-07-17 ASSESSMENT — LIFESTYLE VARIABLES: HISTORY_ALCOHOL_USE: NO

## 2021-07-17 NOTE — ED NOTES
Level of Care Disposition: Admit     Patient was seen by ED provider and [facility] staff. The case presented to psychiatric provider on-call Dr. Rosa Guillen. Based on the ED evaluation and information presented to the provider by STEPHAN Ramos it is the recommendation of the on call psychiatric provider that inpatient hospitalization is the least restrictive environment for the patient at this time. The patient will be admitted to the inpatient unit.      Insurance Pre certification Authorization: no insurance          [facility]  07/17/21 1331

## 2021-07-17 NOTE — ED NOTES
Took over as  from previous jhonathan.      Cindi Rater  [facility]  07/17/21 3691

## 2021-07-17 NOTE — BH NOTE
Reported feeling less anxious/agitated. Sitting at dinning table coloring. Reports HA has also improved.

## 2021-07-17 NOTE — BH NOTE
Patient arrived to I unit at 1400, accompanied by LISA RN, Roni Nelson. Pt belongings and paperwork collected. VS obtained. Pt denies any thoughts of harming self or others at this time and CFS while on unit. Dr. Lobito Patel aware.

## 2021-07-17 NOTE — ED NOTES
Pt arrived to the [facility] AN [facility] OF [facility]. IV removed from RAC. Pt requested water, provided. Pt resting comfortably in bed.       Zack Alicea RN  07/17/21 1123

## 2021-07-17 NOTE — BH NOTE
Purposeful Rounding    Patient Location Group room    Patient willing to engage in conversationYes    Presentation/behavior Anxious, Cooperative and Pleasant    Affect Sad    Concerns reported: emotional well being    PRN medications given: none at this time    Environmental assessment Room free from clutter, Clear path to bathroom , Adequate lighting and No safety hazards noted    Fall prevention interventions in place: none required    Daily Jaiden Fall Risk Score :85    Daily Blevins Fall Risk Score : low

## 2021-07-17 NOTE — BH NOTE
Received Zyprexa 5 mg PO to aid in decreasing anxiety/agitation. Able to eat evening meal. Fluids taken good. B/P remains elevated at 184/96 Hr 85.
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
## 2021-07-17 NOTE — ED NOTES
Patient states she gets her meds refilled at [facility], which cant be verified on the weekend. Meds verified according to pills bottles in her purse. She stated that these were the only medications she was currently taking.       Caren Gosselin, RN  07/17/21 2397

## 2021-07-17 NOTE — BH NOTE
Admission and paperwork completed.       [facility]  Admission Note     Admission Type:   Admission Type: Voluntary    Reason for admission:  Reason for Admission: Self-harming behavior and unable to contract for safety    PATIENT STRENGTHS:  Strengths: No significant Physical Illness, Positive Support, Medication Compliance    Patient Strengths and Limitations:  Limitations: Difficult relationships / poor social skills    Addictive Behavior:   Addictive Behavior  In the past 3 months, have you felt or has someone told you that you have a problem with:  : None  Do you have a history of Chemical Use?: No  Do you have a history of Alcohol Use?: No  Do you have a history of Street Drug Abuse?: No  Histroy of Prescripton Drug Abuse?: No    Medical Problems:   Past Medical History:   Diagnosis Date    Alcohol abuse     Depression     Diverticulosis     DOMINICK (generalized anxiety disorder)     GERD (gastroesophageal reflux disease)     Kidney stone     Tobacco abuse        Status EXAM:  Status and Exam  Normal: No  Facial Expression: Sad, Flat  Affect: Appropriate, Congruent  Level of Consciousness: Alert  Mood:Normal: No  Mood: Depressed, Anxious, Sad  Motor Activity:Normal: Yes  Interview Behavior: Cooperative  Preception: [employer] to Person, [place] to Time, [employer] to Place, [employer] to Situation  Attention:Normal: Yes  Thought Processes: Other(See comment) (WNL; linear)  Thought Content:Normal: No  Thought Content: Preoccupations  Hallucinations: None  Delusions: No  Memory:Normal: Yes  Insight and Judgment: No  Insight and Judgment: Poor Insight, Poor Judgment  Present Suicidal Ideation: No  Present Homicidal Ideation: No    Tobacco Screening:  Practical Counseling, on admission, jacobo X, if applicable and completed (first 3 are required if patient doesn't refuse):            ( )  Recognizing danger situations (included triggers and roadblocks)                    ( )  Coping skills (new ways to manage stress, exercise, relaxation techniques, changing routine, distraction)                                                           ( )  Basic information about quitting (benefits of quitting, techniques in how to quit, available resources  ( ) Referral for counseling faxed to [facility]                                           ( ) Patient refused counseling  ( ) Patient has not smoked in the last 30 days    Metabolic Screening:    Lab Results   Component Value Date    LABA1C 5.5 11/28/2020       Lab Results   Component Value Date    CHOL 199 11/28/2020    CHOL 203 (H) 02/02/2010     Lab Results   Component Value Date    TRIG 95 11/28/2020    TRIG 94 02/02/2010     Lab Results   Component Value Date    HDL 73 (H) 11/28/2020    HDL 57 02/02/2010     No components found for: LDLCAL  Lab Results   Component Value Date    LABVLDL 19 11/28/2020    LABVLDL 19 02/02/2010         Body mass index is 32.01 kg/m². BP Readings from Last 2 Encounters:   07/17/21 (!) 198/111   07/17/21 (!) 155/110           Pt admitted with followings belongings:  Dentures: None  Vision - Corrective Lenses: Glasses (readers)  Hearing Aid: None  Jewelry: Ring (3 credit cards, 's lic)  Body Piercings Removed: N/A  Clothing: Footwear, Pants, Shirt, Undergarments (Comment)  Were All Patient Medications Collected?: Yes  Other Valuables: Cell phone, Purse, [place] 1923, Keys, Money (Comment), Other (Comment) (several bottles of home meds and naprosyn tabsl)     Patient's home medications were DOCUMENTED & PLACED IN LOCKER. Patient oriented to surroundings and program expectations and copy of patient rights given. Received admission packet:  YES. Consents reviewed, signed YES. Refused NONE. Patient verbalize understanding:  YES.   Patient education on precautions: Cherise Kowalski RN

## 2021-07-17 NOTE — GROUP NOTE
Group Therapy Note    Date: 7/17/2021    Group Start Time: 1600  Group End Time: 36  Group Topic: Healthy Living/Wellness    [address], RN        Group Therapy Note    Attendees: 4/16         Patient's Goal:  To discuss the health benefits of meditation, and to participated in a guided mediation    Status After Intervention:  Unchanged    Participation Level: Interactive    Participation Quality: Appropriate      Speech:  normal      Thought Process/Content: Logical      Affective Functioning: Congruent      Mood: euthymic      Level of consciousness:  Alert      Response to Learning: Capable of insight      Endings: None Reported    Modes of Intervention: Education and Activity      Discipline Responsible: Registered Nurse      Signature:  Rayna Elizondo RN

## 2021-07-18 LAB
CHOLESTEROL, TOTAL: 227 MG/DL (ref 0–199)
HDLC SERPL-MCNC: 78 MG/DL (ref 40–60)
LDL CHOLESTEROL CALCULATED: 123 MG/DL
TRIGL SERPL-MCNC: 132 MG/DL (ref 0–150)
VLDLC SERPL CALC-MCNC: 26 MG/DL

## 2021-07-18 PROCEDURE — 83036 HEMOGLOBIN GLYCOSYLATED A1C: CPT

## 2021-07-18 PROCEDURE — 99221 1ST HOSP IP/OBS SF/LOW 40: CPT | Performed by: NURSE PRACTITIONER

## 2021-07-18 PROCEDURE — 99223 1ST HOSP IP/OBS HIGH 75: CPT | Performed by: PSYCHIATRY & NEUROLOGY

## 2021-07-18 PROCEDURE — 1240000000 HC EMOTIONAL WELLNESS R&B

## 2021-07-18 PROCEDURE — 80061 LIPID PANEL: CPT

## 2021-07-18 PROCEDURE — 6370000000 HC RX 637 (ALT 250 FOR IP): Performed by: PSYCHIATRY & NEUROLOGY

## 2021-07-18 PROCEDURE — 36415 COLL VENOUS BLD VENIPUNCTURE: CPT

## 2021-07-18 RX ORDER — TRAMADOL HYDROCHLORIDE 50 MG/1
50 TABLET ORAL EVERY 6 HOURS PRN
Status: DISCONTINUED | OUTPATIENT
Start: 2021-07-18 | End: 2021-07-22 | Stop reason: HOSPADM

## 2021-07-18 RX ORDER — NICOTINE 21 MG/24HR
1 PATCH, TRANSDERMAL 24 HOURS TRANSDERMAL DAILY
Status: DISCONTINUED | OUTPATIENT
Start: 2021-07-18 | End: 2021-07-22 | Stop reason: HOSPADM

## 2021-07-18 RX ORDER — QUETIAPINE FUMARATE 25 MG/1
12.5 TABLET, FILM COATED ORAL 3 TIMES DAILY
Status: DISCONTINUED | OUTPATIENT
Start: 2021-07-18 | End: 2021-07-22 | Stop reason: HOSPADM

## 2021-07-18 RX ORDER — QUETIAPINE FUMARATE 25 MG/1
25 TABLET, FILM COATED ORAL NIGHTLY
Status: DISCONTINUED | OUTPATIENT
Start: 2021-07-18 | End: 2021-07-22 | Stop reason: HOSPADM

## 2021-07-18 RX ORDER — BUSPIRONE HYDROCHLORIDE 10 MG/1
10 TABLET ORAL 3 TIMES DAILY
Status: DISCONTINUED | OUTPATIENT
Start: 2021-07-18 | End: 2021-07-22 | Stop reason: HOSPADM

## 2021-07-18 RX ADMIN — ACETAMINOPHEN 650 MG: 325 TABLET ORAL at 12:56

## 2021-07-18 RX ADMIN — BUSPIRONE HYDROCHLORIDE 10 MG: 10 TABLET ORAL at 09:21

## 2021-07-18 RX ADMIN — QUETIAPINE FUMARATE 12.5 MG: 25 TABLET ORAL at 09:30

## 2021-07-18 RX ADMIN — BUSPIRONE HYDROCHLORIDE 10 MG: 10 TABLET ORAL at 14:37

## 2021-07-18 RX ADMIN — CLONIDINE HYDROCHLORIDE 0.1 MG: 0.1 TABLET ORAL at 20:36

## 2021-07-18 RX ADMIN — AMLODIPINE BESYLATE 2.5 MG: 2.5 TABLET ORAL at 08:06

## 2021-07-18 RX ADMIN — DULOXETINE HYDROCHLORIDE 60 MG: 60 CAPSULE, DELAYED RELEASE ORAL at 08:06

## 2021-07-18 RX ADMIN — CLONIDINE HYDROCHLORIDE 0.1 MG: 0.1 TABLET ORAL at 08:06

## 2021-07-18 RX ADMIN — QUETIAPINE FUMARATE 25 MG: 25 TABLET ORAL at 20:35

## 2021-07-18 RX ADMIN — BUSPIRONE HYDROCHLORIDE 10 MG: 10 TABLET ORAL at 20:36

## 2021-07-18 RX ADMIN — QUETIAPINE FUMARATE 12.5 MG: 25 TABLET ORAL at 14:36

## 2021-07-18 ASSESSMENT — SLEEP AND FATIGUE QUESTIONNAIRES
DO YOU USE A SLEEP AID: YES
RESTFUL SLEEP: NO
AVERAGE NUMBER OF SLEEP HOURS: 4
DIFFICULTY ARISING: NO
DO YOU HAVE DIFFICULTY SLEEPING: YES
SLEEP PATTERN: DIFFICULTY FALLING ASLEEP;DISTURBED/INTERRUPTED SLEEP
DIFFICULTY STAYING ASLEEP: YES
DIFFICULTY FALLING ASLEEP: YES

## 2021-07-18 ASSESSMENT — PATIENT HEALTH QUESTIONNAIRE - PHQ9: SUM OF ALL RESPONSES TO PHQ QUESTIONS 1-9: 15

## 2021-07-18 ASSESSMENT — LIFESTYLE VARIABLES: HISTORY_ALCOHOL_USE: YES

## 2021-07-18 ASSESSMENT — PAIN SCALES - GENERAL: PAINLEVEL_OUTOF10: 3

## 2021-07-18 NOTE — FLOWSHEET NOTE
21 0934   Psychiatric History   Psychiatric history treatment Psychiatric admissions  (Pt reported prior admissions to [address] [place] [address];)   Are there any medication issues? No   Support System   Support system None   Problems in support system Lack of friends/family; Losses;Isolated   Current Living Situation   Home Living Adequate   Living information Lives alone   Problems with living situation  Yes   Lack of basic needs Yes   Lacking basic needs Food   SSDI/SSI Pt denied   Other government assistance Pt denied   Problems with environment Pt reported no income at this time and potentially going to leave home/apartment; does not have money for food   Current abuse issues Pt denied   Supervised setting None   Relationship problems No   Medical and Self-Care Issues   Relevant medical problems Pt reported cycst on ovary, HTN   Relevant self-care issues Pt denied   Barriers to treatment No   Family Constellation   Spouse/partner-name/age Pt denied;    Children-names/ages Pt reported 39year old daughter, estranged   Parents    Siblings sister recently  of cancer   Childhood   Raised by Biological mother;Biological father   Relevant family history Pt verbalized being unsure of family h/o mental illness   History of abuse No   Legal History   Legal history No   Juvenile legal history No    Abuse Assessment   Physical Abuse Denies   Verbal Abuse Denies   Emotional abuse Denies   Financial Abuse Denies   Sexual abuse Denies   Elder abuse No   Substance Use   Use of substances  Yes   Substance 1   Substance used Alcohol   Amount/frequency/route every other day   Last use/History couple days ago   Motivation for SA Treatment   Stage of engagement Pre-engagement/engagement   Motivation for treatment Yes   Education   Education HS graduate -GED   Work History   Currently employed No   Recent job loss or change Quit    service   (Pt denied)   Leisure/Activity   Past interests 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 Pt endorsed s/sx of depression, scoring 15 on the PHQ9. Pt reported excessive regular use of alcohol. Pt reported having multiple psychiatric admissions and that she had been seeing a psychiatrist at [address] [place] [address],[address] however no therapist.  Pt lacks support system as both parents and siblings are  and currently does not have employment.      ROSSY Amanda, [facility], [facility]

## 2021-07-18 NOTE — FLOWSHEET NOTE
Purposeful Rounding     Patient Location Patient room     Patient willing to engage in conversation No, asleep.     Presentation/behavior Cooperative     Affect Flat/blunted     Concerns reported: none     PRN medications given:none     Environmental assessment No safety hazards noted     Fall prevention interventions in place: na     Daily [place] Fall Risk Score :85     Daily [place] Fall Risk Score : low

## 2021-07-18 NOTE — BH NOTE
Purposeful Rounding    Patient Location Day room    Patient willing to engage in conversationYes    Presentation/behavior Cooperative and Pleasant    Affect Depressed    Concerns reported: reported needing a nicotine patch and feeling \"I feel shaky\"    PRN medications given: none at this time    Environmental assessment Room free from clutter, Clear path to bathroom , Adequate lighting and No safety hazards noted    Fall prevention interventions in place: none required    Daily [place] Fall Risk Score : 77    Daily [place] Fall Risk Score : low

## 2021-07-18 NOTE — H&P
Hospital Medicine History & Physical      PCP: MTICHELL Callahan NP    Date of Admission: 7/17/2021    Date of Service: Pt seen/examined on 7/18/2021     Chief Complaint:  Depression, thoughts of self harm    History Of Present Illness: The patient is a 48 y.o. female with GERD, anxiety, and depression who presented to [facility] ED with complaint of thoughts of self harm. Patient was seen and evaluated in the ED by the ED medical provider, patient was medically cleared for admission to [facility]. This note serves as an admission medical H&P.    PCP: MITCHELL Callahan NP  Tobacco use: current  ETOH use: 4 times per week  Illicit drug use: occasional Cannabis    Patient denies any symptoms/complaints. Past Medical History:        Diagnosis Date    Alcohol abuse     Depression     Diverticulosis     DOMINICK (generalized anxiety disorder)     GERD (gastroesophageal reflux disease)     Kidney stone     Tobacco abuse        Past Surgical History:        Procedure Laterality Date    COLONOSCOPY  2006    HYSTERECTOMY  2008       Medications Prior to Admission:    Prior to Admission medications    Medication Sig Start Date End Date Taking?  Authorizing Provider   amLODIPine (NORVASC) 2.5 MG tablet Take 2.5 mg by mouth daily   Yes Historical Provider, MD   DULoxetine (CYMBALTA) 60 MG extended release capsule Take 1 capsule by mouth daily 12/2/20  Yes Adalgisa Rodriguez MD   cloNIDine (CATAPRES) 0.1 MG tablet Take 1 tablet by mouth 2 times daily 12/1/20  Yes Adalgisa Rodriguez MD   TRAZODONE HCL PO Take 50 mg by mouth nightly    Historical Provider, MD   DULoxetine 40 MG CPEP Take 40 mg by mouth Daily with lunch 12/1/20   Adalgisa Rodriguez MD   dicyclomine (BENTYL) 20 MG tablet Take 1 tablet by mouth every 6 hours as needed (Abdominal Cramping) 12/1/20   Adalgisa Rodriguez MD   QUEtiapine (SEROQUEL) 25 MG tablet Take 1 tablet by mouth nightly 12/1/20   Adalgisa Rodriguez MD QUEtiapine (SEROQUEL) 25 MG tablet Take 0.5 tablets by mouth every 8 hours as needed for Other (anxiety) 12/1/20   Semaj Rai MD   nicotine (NICODERM CQ) 14 MG/24HR Place 1 patch onto the skin daily 4/6/18   León Marquez MD   vitamin B-1 100 MG tablet Take 1 tablet by mouth daily 4/6/18   León Marquez MD   vitamin D (CHOLECALCIFEROL) 1000 UNIT TABS tablet Take 1 tablet by mouth daily 4/6/18   León Marquez MD   Multiple Vitamin (MULTIVITAMIN) tablet Take 1 tablet by mouth daily 3/21/18   MITCHELL Multani - CNP       Allergies:  Bupropion, Macrobid [nitrofurantoin macrocrystal], Morphine and related, and Varenicline    Social History:    TOBACCO:   reports that she has been smoking cigarettes. She has been smoking about 0.25 packs per day. She has never used smokeless tobacco.  ETOH:   reports current alcohol use of about 42.0 standard drinks of alcohol per week. Family History:   Positive as follows:        Problem Relation Age of Onset    Diabetes Mother     High Cholesterol Mother     High Blood Pressure Mother     Diabetes Father     High Cholesterol Father     Cancer Father        REVIEW OF SYSTEMS:       Constitutional: Negative for fever   HENT: Negative for sore throat   Respiratory: Negative  for dyspnea, cough   Cardiovascular: Negative for chest pain   Gastrointestinal: Negative for vomiting, diarrhea   Genitourinary: Negative for dysuria  Musculoskeletal: Negative for arthralgias   Skin: Negative for rash   Neurological: Negative for syncope   Psychiatric/Behavorial: per psychiatric evaluation    PHYSICAL EXAM:    BP (!) 173/103   Pulse 71   Temp 97.7 °F (36.5 °C) (Temporal)   Resp 16   Ht 5' 2\" (1.575 m)   Wt 175 lb (79.4 kg)   SpO2 98%   BMI 32.01 kg/m²     Gen: No distress. Alert. Eyes: PERRL. No sclera icterus. No conjunctival injection. ENT: No discharge. Pharynx clear. Neck: No JVD. No Carotid Bruit. Trachea midline.   Resp: No accessory muscle use. No crackles. No wheezes. No rhonchi. CV: Regular rate. Regular rhythm. No murmur. No rub. No edema. GI: Non-tender. Non-distended. Normal bowel sounds. Skin: Warm and dry. No nodule on exposed extremities. No rash on exposed extremities. M/S: No cyanosis. No joint deformity. No clubbing. Neuro: Awake. No focal neurologic deficit on exam.  Cranial nerves II through XII intact. Patient is able to ambulate without difficulty. Psych: Per psychiatry team evaluation       CBC:   Recent Labs     07/16/21  1658   WBC 8.5   HGB 15.1   HCT 43.6   .0*        BMP:   Recent Labs     07/16/21  1658      K 3.4*      CO2 24   BUN 7   CREATININE 0.6     LIVER PROFILE:   Recent Labs     07/16/21  1658   LIPASE 25.0     UA:  Recent Labs     07/16/21  1658   COLORU YELLOW   PHUR 6.0  6.5   CLARITYU Clear   SPECGRAV 1.005   LEUKOCYTESUR Negative   UROBILINOGEN 0.2   BILIRUBINUR Negative   BLOODU Negative   GLUCOSEU Negative       CULTURES  COVID: not detected    EKG:  I have reviewed the EKG with the following interpretation:   NSR    RADIOLOGY  CT abdomen/pelvis 7/16/17  1. No acute findings within the abdomen or pelvis.  Colonic diverticulosis   with no acute features.  No evidence of appendicitis. 2. Complex septated cystic lesion in the left adnexa measuring 2.5 x 3.9 cm. Ultrasound follow-up is recommended. 3. Hepatic steatosis.       RECOMMENDATIONS:   3.9 cm indeterminate adnexal cyst.  Recommend prompt follow-up with pelvic US. Reference: J Am Kam Radiol 5160;[id number]       ASSESSMENT/PLAN:  Hypokalemia  - replete with diet. Macrocytosis  - most likely related to alcohol use. - reports increased drinking recently  - monitor for s/s alcohol withdrawal.     Adnexal cyst  - CT recommends prompt follow up  - pelvic US ordered. Hypertension  - BP elevated. - resumed home Amlodipine, Clonidine  - BP improved.      Cannabis use  Cocaine use  - recommend cessation    Tobacco Dependence  -Recommended cessation    Obesity  - Body mass index is 32.01 kg/m². - Recommended weight loss    Pt has no medical complaints at this time. They were informed that should a medical concern arise during their admission they may have [facility] contact us.     Lexy OLMSTEAD  7/18/2021 10:46 AM

## 2021-07-18 NOTE — FLOWSHEET NOTE
Purposeful Rounding    Patient Location Patient room    Patient willing to engage in conversationYes    Presentation/behavior Cooperative    Affect Flat/blunted    Concerns reported: none    PRN medications given:none    Environmental assessment No safety hazards noted    Fall prevention interventions in place: na    Daily [place] Fall Risk Score :85    Daily [place] Fall Risk Score : low

## 2021-07-18 NOTE — PLAN OF CARE
Problem: Altered Mood, Depressive Behavior:  Goal: Able to verbalize acceptance of life and situations over which he or she has no control  Description: Able to verbalize acceptance of life and situations over which he or she has no control  Outcome: Ongoing  Note: Reported feeling somewhat better today. \"I still don't what is going on in my body\", as she referenced a cyst on an ovary. Easily becomes tearful. Also reported the newly scheduled medication seems to be effective decreasing anxiety.      Problem: Altered Mood, Depressive Behavior:  Goal: Able to verbalize and/or display a decrease in depressive symptoms  Description: Able to verbalize and/or display a decrease in depressive symptoms  Outcome: Ongoing     Problem: Altered Mood, Depressive Behavior:  Goal: Absence of self-harm  Description: Absence of self-harm  Outcome: Met This Shift

## 2021-07-18 NOTE — FLOWSHEET NOTE
Purposeful Rounding     Patient Location Patient room     Patient willing to engage in conversation No, asleep.     Presentation/behavior Cooperative     Affect Flat/blunted     Concerns reported: none     PRN medications given:none     Environmental assessment No safety hazards noted     Fall prevention interventions in place: na     Daily [place] Fall Risk Score :85     Daily [place] Fall Risk Score : low

## 2021-07-18 NOTE — BH NOTE
Purposeful Rounding    Patient Location Day room    Patient willing to engage in conversationYes    Presentation/behavior Anxious, Cooperative and Pleasant    Affect Anxious    Concerns reported: none at this time    PRN medications given:none at this time    Environmental assessment Room free from clutter, Clear path to bathroom , Adequate lighting and No safety hazards noted    Fall prevention interventions in place: none required    Daily [place] Fall Risk Score : 77    Daily [place] Fall Risk Score : low

## 2021-07-18 NOTE — BH NOTE
1256 received tylenol 650 to aid in decreasing discomfort of mid abdomen around navel. Area pink and irritated. PA aware and Xray order received.

## 2021-07-18 NOTE — BH NOTE
Lying in bed with eyes closed. Sat up to recheck vitals. \"Wow, that really came down\", \"I guess all that was anxiety\". Reports feeling tired. \"I feel so much better\".

## 2021-07-18 NOTE — FLOWSHEET NOTE
Purposeful Rounding    Patient Location Patient room    Patient willing to engage in conversationYes    Presentation/behavior Cooperative    Affect Flat/blunted    Concerns reported: none    PRN medications given:none    Environmental assessment No safety hazards noted    Fall prevention interventions in place: na    Daily [place] Fall Risk Score :85    Daily [place] Fall Risk Score : low

## 2021-07-18 NOTE — PLAN OF CARE
Problem: Altered Mood, Depressive Behavior:  Goal: Able to verbalize acceptance of life and situations over which he or she has no control  Description: Able to verbalize acceptance of life and situations over which he or she has no control  Outcome: Ongoing     Problem: Altered Mood, Depressive Behavior:  Goal: Able to verbalize and/or display a decrease in depressive symptoms  Description: Able to verbalize and/or display a decrease in depressive symptoms  Outcome: Ongoing     Problem: Altered Mood, Depressive Behavior:  Goal: Absence of self-harm  Description: Absence of self-harm  Outcome: Ongoing   La has been isolative to her room and declined group this shift. Patient medication compliant and denies current SI/HI. Denies A/V/H and remains absent of self harm this shift. Reports \" I am just tired. \" and contracts for safety.

## 2021-07-19 ENCOUNTER — APPOINTMENT (OUTPATIENT)
Dept: ULTRASOUND IMAGING | Age: 56
DRG: 885 | End: 2021-07-19

## 2021-07-19 PROBLEM — F14.20 COCAINE USE DISORDER, MODERATE, IN CONTROLLED ENVIRONMENT (HCC): Status: ACTIVE | Noted: 2021-07-19

## 2021-07-19 PROBLEM — F12.20 CANNABIS USE DISORDER, MODERATE, IN CONTROLLED ENVIRONMENT (HCC): Status: ACTIVE | Noted: 2021-07-19

## 2021-07-19 PROBLEM — F10.20 ALCOHOL USE DISORDER, MODERATE, IN CONTROLLED ENVIRONMENT (HCC): Status: ACTIVE | Noted: 2021-07-19

## 2021-07-19 LAB
ESTIMATED AVERAGE GLUCOSE: 111.2 MG/DL
HBA1C MFR BLD: 5.5 %

## 2021-07-19 PROCEDURE — 1240000000 HC EMOTIONAL WELLNESS R&B

## 2021-07-19 PROCEDURE — 6370000000 HC RX 637 (ALT 250 FOR IP): Performed by: PSYCHIATRY & NEUROLOGY

## 2021-07-19 PROCEDURE — 76856 US EXAM PELVIC COMPLETE: CPT

## 2021-07-19 PROCEDURE — 6370000000 HC RX 637 (ALT 250 FOR IP): Performed by: PHYSICIAN ASSISTANT

## 2021-07-19 PROCEDURE — 99233 SBSQ HOSP IP/OBS HIGH 50: CPT | Performed by: PSYCHIATRY & NEUROLOGY

## 2021-07-19 RX ORDER — AMLODIPINE BESYLATE 2.5 MG/1
2.5 TABLET ORAL DAILY
Status: DISCONTINUED | OUTPATIENT
Start: 2021-07-20 | End: 2021-07-22 | Stop reason: HOSPADM

## 2021-07-19 RX ORDER — AMLODIPINE BESYLATE 2.5 MG/1
2.5 TABLET ORAL ONCE
Status: COMPLETED | OUTPATIENT
Start: 2021-07-19 | End: 2021-07-19

## 2021-07-19 RX ORDER — AMLODIPINE BESYLATE 5 MG/1
5 TABLET ORAL DAILY
Status: DISCONTINUED | OUTPATIENT
Start: 2021-07-20 | End: 2021-07-19

## 2021-07-19 RX ADMIN — BUSPIRONE HYDROCHLORIDE 10 MG: 10 TABLET ORAL at 21:22

## 2021-07-19 RX ADMIN — CLONIDINE HYDROCHLORIDE 0.1 MG: 0.1 TABLET ORAL at 09:05

## 2021-07-19 RX ADMIN — BUSPIRONE HYDROCHLORIDE 10 MG: 10 TABLET ORAL at 09:05

## 2021-07-19 RX ADMIN — AMLODIPINE BESYLATE 2.5 MG: 2.5 TABLET ORAL at 11:45

## 2021-07-19 RX ADMIN — ACETAMINOPHEN 650 MG: 325 TABLET ORAL at 00:13

## 2021-07-19 RX ADMIN — TRAZODONE HYDROCHLORIDE 50 MG: 50 TABLET ORAL at 00:13

## 2021-07-19 RX ADMIN — AMLODIPINE BESYLATE 2.5 MG: 2.5 TABLET ORAL at 09:06

## 2021-07-19 RX ADMIN — QUETIAPINE FUMARATE 25 MG: 25 TABLET ORAL at 21:22

## 2021-07-19 RX ADMIN — DULOXETINE HYDROCHLORIDE 90 MG: 60 CAPSULE, DELAYED RELEASE ORAL at 09:05

## 2021-07-19 RX ADMIN — CLONIDINE HYDROCHLORIDE 0.1 MG: 0.1 TABLET ORAL at 21:23

## 2021-07-19 RX ADMIN — TRAMADOL HYDROCHLORIDE 50 MG: 50 TABLET, FILM COATED ORAL at 11:44

## 2021-07-19 RX ADMIN — QUETIAPINE FUMARATE 12.5 MG: 25 TABLET ORAL at 17:32

## 2021-07-19 RX ADMIN — BUSPIRONE HYDROCHLORIDE 10 MG: 10 TABLET ORAL at 16:09

## 2021-07-19 RX ADMIN — QUETIAPINE FUMARATE 12.5 MG: 25 TABLET ORAL at 09:05

## 2021-07-19 RX ADMIN — TRAMADOL HYDROCHLORIDE 50 MG: 50 TABLET, FILM COATED ORAL at 18:42

## 2021-07-19 ASSESSMENT — PAIN SCALES - GENERAL
PAINLEVEL_OUTOF10: 6
PAINLEVEL_OUTOF10: 4
PAINLEVEL_OUTOF10: 6

## 2021-07-19 NOTE — FLOWSHEET NOTE
Purposeful Rounding    Patient Location Patient room    Patient willing to engage in conversationYes    Presentation/behavior Cooperative and Pleasant    Affect Brightens with interaction    Concerns reported: none    PRN medications given:none    Environmental assessment No safety hazards noted    Fall prevention interventions in place: na    Daily [place] Fall Risk Score :77    Daily [place] Fall Risk Score : low

## 2021-07-19 NOTE — PROGRESS NOTES
Occupational Therapy      Attempted OT eval.  Hold OT for Psychiatrist note.     Nikhil Cramer, OTR/L  #[id number]

## 2021-07-19 NOTE — PROGRESS NOTES
I reviewed the pelvic US report from 7/19/21:     FINDINGS:   Uterus is absent       Right ovary not seen       Left ovary measures 3.7 x 2.5 x 2.3 cm.  Flow is seen in left ovary. Septated nodule is seen in the left ovary measuring 3.7 x 2.2 x 2.6 cm.           Impression   Septated nodule left ovary.  Recommend follow-up pelvic ultrasound in 2-3   months time for further characterization     I discussed these results with La. She stated the understanding of findings and the need for follow up US in 2-3 months. I have given her a referral to GYN in the area of [facility] where she resides. She will call for an appt after dc from [facility].     Rob Jean Baptiste PA-C  7/19/2021 4:05 PM

## 2021-07-19 NOTE — GROUP NOTE
Group Therapy Note    Date: 7/18/2021    Group Start Time: 2000  Group End Time: 2025  Group Topic: Wrap-Up    [facility]        Group Therapy Note    Attendees: Goals and importance of goal setting discussed. Night time milieu activities discussed. Patient's Goal:  Use and learn new coping skills to deal with loss of sister    Notes:  Successful     Status After Intervention:  Improved    Participation Level:  Active Listener and Interactive    Participation Quality: Appropriate, Attentive and Sharing      Speech:  normal      Thought Process/Content: Logical  Linear      Affective Functioning: Congruent      Mood: anxious      Level of consciousness:  Alert and Oriented x4      Response to Learning: Progressing to goal      Endings: None Reported    Modes of Intervention: Support      Discipline Responsible: [email or web address]      Signature:  Debora Trevizo

## 2021-07-19 NOTE — FLOWSHEET NOTE
Purposeful Rounding    Patient Location Patient room    Patient willing to engage in conversationNo    Presentation/behavior Other sleeping*    Affect Neutral/Euthymic(normal)    Concerns reported: none    PRN medications given:none    Environmental assessment No safety hazards noted    Fall prevention interventions in place: na    Daily [place] Fall Risk Score :77    Daily [place] Fall Risk Score : low

## 2021-07-19 NOTE — PLAN OF CARE
Problem: Altered Mood, Depressive Behavior:  Goal: Able to verbalize acceptance of life and situations over which he or she has no control  Description: Able to verbalize acceptance of life and situations over which he or she has no control  7/19/2021 1358 by Jessica Loo LPN  Outcome: Ongoing     Problem: Altered Mood, Depressive Behavior:  Goal: Able to verbalize and/or display a decrease in depressive symptoms  Description: Able to verbalize and/or display a decrease in depressive symptoms  7/19/2021 1358 by Jessica Loo LPN  Outcome: Ongoing   La has been visible and social with select peers. Patient expresses feeling depressed from recent losses of family members. Patient reports some left sided pain this shift patient requested and received prn tramadol. Patient feels concern regarding pain maybe related to said cyst on ovaries. Patient currently denies SI/HI/AVH. Will continue to monitor for safety.
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
## 2021-07-19 NOTE — FLOWSHEET NOTE
Purposeful Rounding     Patient Location Patient room     Patient willing to engage in conversationNo     Presentation/behavior Other sleeping*     Affect Neutral/Euthymic(normal)     Concerns reported: none     PRN medications given:none     Environmental assessment No safety hazards noted     Fall prevention interventions in place: na     Daily [place] Fall Risk Score :77     Daily Blevins Fall Risk Score : low

## 2021-07-19 NOTE — GROUP NOTE
Group Therapy Note    Date: 7/19/2021    Group Start Time: 1005  Group End Time: 1100  Group Topic: [facility]        Group Therapy Note    Attendees: 9         Patient's Goal:  Patients were invited to engage in an Art Therapy group using open art studio. Patients were encouraged to practice self-care by asking themselves - and sharing with the group - what it is they needed or could give themselves during the art therapy session. Patients were then invited to experiment with a variety of art materials to practice creative self-expression as a coping strategy and to work towards their self-identified goal. Towards the end of session, patients were encouraged to share and process their artwork with the group. Notes:  Vinnie Anna identified a need to \"relax\" and to \"not take things so seriously,\" engaged in art making and conversation with peers, and processed artwork at the end of session. Status After Intervention:  Improved    Participation Level:  Active Listener and Interactive    Participation Quality: Appropriate, Attentive, Sharing and Supportive      Speech:  normal      Thought Process/Content: Logical      Affective Functioning: Congruent      Mood: depressed      Level of consciousness:  Alert, Oriented x4 and Attentive      Response to Learning: Able to verbalize current knowledge/experience, Able to verbalize/acknowledge new learning, Able to retain information and Capable of insight      Endings: None Reported    Modes of Intervention: Education, Support, Socialization, Exploration, Clarifying, Problem-solving, Activity and Media      Discipline Responsible: Psychoeducational Specialist      Signature:  Randee Clark

## 2021-07-19 NOTE — PROGRESS NOTES
Department of Psychiatry  Progress Note    Patient's chart was reviewed. Discussed with treatment team. Met with patient. SUBJECTIVE:      Continues to report left-sided abdominal pain. Adnexal cyst being worked up. Remains depressed but feels safe here. Tolerated medication well. Limited insight into substance use disorders. ROS:   Patient has new complaints: yes - see above   Sleeping adequately:  No: maintenance    Appetite adequate: Yes  Engaged in programming: some     OBJECTIVE:  VITALS:  BP (!) 158/94   Pulse 61   Temp 97.5 °F (36.4 °C) (Temporal)   Resp 18   Ht 5' 2\" (1.575 m)   Wt 175 lb (79.4 kg)   SpO2 94%   BMI 32.01 kg/m²     Mental Status Examination:    Appearance: fair grooming and hygiene  Behavior/Attitude toward examiner:  cooperative, attentive and fair eye contact  Speech: low volume. Mood:  \"ok\"  Affect:  blunted     Thought processes:  Goal directed, linear, no JASEN or gross disorganization  Thought Content: less SI, no HI, no delusions voiced, no obsessions  Perceptions: no AVH  Attention: attention span and concentration were intact to interview   Abstraction: intact  Cognition:  Alert and oriented to person, place, time, and situation, recall intact  Insight: fair  Judgment: fair    Medication:  Scheduled:   [START ON 7/20/2021] amLODIPine  5 mg Oral Daily    amLODIPine  2.5 mg Oral Once    nicotine  1 patch Transdermal Daily    QUEtiapine  12.5 mg Oral TID    QUEtiapine  25 mg Oral Nightly    DULoxetine  90 mg Oral Daily    busPIRone  10 mg Oral TID    cloNIDine  0.1 mg Oral BID        PRN:  traMADol, acetaminophen, ibuprofen, magnesium hydroxide, hydrOXYzine, traZODone, aluminum & magnesium hydroxide-simethicone, OLANZapine **OR** OLANZapine, sterile water     ASSESSMENT AND PLAN:  48 y.o. female with GERD, anxiety, and depression who presented to [facility] ED with complaint of thoughts of self harm.     Principal Problem:    Unspecified mood (affective) disorder ([facility]  Active Problems:    Hypokalemia    Hypertension, essential    Adnexal cyst    Alcohol use disorder, moderate, in controlled environment ([facility]    Cannabis use disorder, moderate, in controlled environment ([facility]    Cocaine use disorder, moderate, in controlled environment [facility])  Resolved Problems:    * No resolved hospital problems. *     1. Admitted to adult psychiatry for stabilization and treatment. 2.On admission, continued quetiapine, increased Cymbalta to 90mg daily, and added Buspar 10mg TID. Ordered q15min checks for safety, programming, and prn medication for anxiety, agitation, and insomnia. 3.Internal medicine consult for admission. Hypokalemia  - replete with diet.      Macrocytosis  - most likely related to alcohol use. - reports increased drinking recently  - monitor for s/s alcohol withdrawal.     Adnexal cyst  - CT recommends prompt follow up  - pelvic US ordered.      Hypertension  - BP elevated. - resumed home Amlodipine, Clonidine  - BP improved. Obesity  - Body mass index is 32.01 kg/m². - Recommended weight loss    4. ELOS 5-8 days. Voluntary. Total time with patient was 35 minutes and more than 50 % of that time was spent counseling the patient on their symptoms, treatment, and expected goals.      Clay Finnegan MD

## 2021-07-20 PROCEDURE — 99233 SBSQ HOSP IP/OBS HIGH 50: CPT | Performed by: PSYCHIATRY & NEUROLOGY

## 2021-07-20 PROCEDURE — 97165 OT EVAL LOW COMPLEX 30 MIN: CPT

## 2021-07-20 PROCEDURE — 1240000000 HC EMOTIONAL WELLNESS R&B

## 2021-07-20 PROCEDURE — 6370000000 HC RX 637 (ALT 250 FOR IP): Performed by: PHYSICIAN ASSISTANT

## 2021-07-20 PROCEDURE — 97535 SELF CARE MNGMENT TRAINING: CPT

## 2021-07-20 PROCEDURE — 6370000000 HC RX 637 (ALT 250 FOR IP): Performed by: PSYCHIATRY & NEUROLOGY

## 2021-07-20 RX ORDER — KETOCONAZOLE 20 MG/G
CREAM TOPICAL 2 TIMES DAILY
Status: DISCONTINUED | OUTPATIENT
Start: 2021-07-20 | End: 2021-07-22 | Stop reason: HOSPADM

## 2021-07-20 RX ADMIN — BUSPIRONE HYDROCHLORIDE 10 MG: 10 TABLET ORAL at 20:55

## 2021-07-20 RX ADMIN — BUSPIRONE HYDROCHLORIDE 10 MG: 10 TABLET ORAL at 13:25

## 2021-07-20 RX ADMIN — QUETIAPINE FUMARATE 12.5 MG: 25 TABLET ORAL at 09:03

## 2021-07-20 RX ADMIN — CLONIDINE HYDROCHLORIDE 0.1 MG: 0.1 TABLET ORAL at 20:55

## 2021-07-20 RX ADMIN — TRAMADOL HYDROCHLORIDE 50 MG: 50 TABLET, FILM COATED ORAL at 22:05

## 2021-07-20 RX ADMIN — TRAMADOL HYDROCHLORIDE 50 MG: 50 TABLET, FILM COATED ORAL at 13:25

## 2021-07-20 RX ADMIN — KETOCONAZOLE: 20 CREAM TOPICAL at 20:56

## 2021-07-20 RX ADMIN — BUSPIRONE HYDROCHLORIDE 10 MG: 10 TABLET ORAL at 09:03

## 2021-07-20 RX ADMIN — QUETIAPINE FUMARATE 12.5 MG: 25 TABLET ORAL at 22:03

## 2021-07-20 RX ADMIN — QUETIAPINE FUMARATE 12.5 MG: 25 TABLET ORAL at 01:50

## 2021-07-20 RX ADMIN — TRAMADOL HYDROCHLORIDE 50 MG: 50 TABLET, FILM COATED ORAL at 01:51

## 2021-07-20 RX ADMIN — QUETIAPINE FUMARATE 25 MG: 25 TABLET ORAL at 20:55

## 2021-07-20 RX ADMIN — CLONIDINE HYDROCHLORIDE 0.1 MG: 0.1 TABLET ORAL at 09:04

## 2021-07-20 RX ADMIN — QUETIAPINE FUMARATE 12.5 MG: 25 TABLET ORAL at 13:25

## 2021-07-20 RX ADMIN — AMLODIPINE BESYLATE 2.5 MG: 2.5 TABLET ORAL at 09:04

## 2021-07-20 RX ADMIN — TRAZODONE HYDROCHLORIDE 50 MG: 50 TABLET ORAL at 22:03

## 2021-07-20 RX ADMIN — DULOXETINE HYDROCHLORIDE 90 MG: 60 CAPSULE, DELAYED RELEASE ORAL at 09:03

## 2021-07-20 ASSESSMENT — PAIN SCALES - GENERAL
PAINLEVEL_OUTOF10: 2
PAINLEVEL_OUTOF10: 6
PAINLEVEL_OUTOF10: 7
PAINLEVEL_OUTOF10: 0
PAINLEVEL_OUTOF10: 6

## 2021-07-20 NOTE — PROGRESS NOTES
Inpatient Occupational Therapy  Evaluation and Treatment    Unit:  [facility]  Date:  7/20/2021  Patient Name:    Mercedes Aquino  Admitting diagnosis:  Depression, unspecified depression type [F32.9]  Admit Date:  7/17/2021  Precautions/Restrictions/WB Status/ Lines/ Wounds/ Oxygen:  Up as tolerated  Treatment Time:  10:03-10:29  Treatment Number:  1    Patient Goals for Therapy:  \" Try to get some sleep. \"      Discharge Recommendations:   []Home Independently  [x] Home with assist prn [] Home OT  []SNF  []ARU    DME needs for discharge:   NT     AM-PAC Score:  24     Home Health S4 Level: [x] NA   [] Level 1- Standard  []  Level 2- Social  [] Level 3- Safety  []  Level 4- Sick    ACLS:  TBA secondary to fatigue and irritability. HPI:  per chart note by Mendoza Bustillo MD; Date of Service:  7/18/2021  49 y/o wf well known to our service from previous admissions that presented initially to [facility] for si and transferred to [facility] for more in depth assessment.   Pt states her sister passed away from stomach cancer, three days ago. She states that this has resulted in her cutting her stomach and arms the last two days. Pt states that she is not having suicidal thoughts but is afraid to be d/c home because of what she could do. She said that she didn't want to cut anymore so she reached out for help. Pt states that she really wants to be admitted because she said that she needs the groups we provide and she wants to talk things through. Pt stated that she feels all alone and she is having problems coping with this recent loss. Pt feels that she does not have any support in the community and she has only few friends. She reports that md took her off the seroquel and she has been feeling anxious, not sleeping well, dep, hopeless, anhedonia, helpless, cutting to relieve the emotional pain, feels heart racing and mind racing, dec appetite. She also reports break up with BF.  Pt does not acknowledge that she has been using cocaine and stated that her cannabis was laced. Pt feels that cymbalta is helpful but she needs something for anxiety and at [facility] they gave her ativan and it was helpful and reports been stable on ssri and clonazepam.   No psychosis and no tigre reported. Dx: axis I: Mdd recurrent moderate, opiate and alcohol abuse  Axis 2: deferred   [place] 3: See Medical History     Axis 4: Problems with primary support group and Other psychosocial and environmental problems     lt is hospitalized    Preadmission Environment:    Pt. Lives   [x] alone  []with family  Home environment:   [x]Apartment   []one story  []two story home. Steps to enter first floor:    [] No steps     [x]  1 Steps to enter   [] Railings    Bathroom: [x] Bath Tub Shower  []Walk in [facility]  [] [employer] owned: [] RW [] SW  []Rollator []W/C  []Shower Chair []BSC []Reacher  [employer] [] Other     Preadmission Status / PLOF:  History of falls   []Yes  [x]No  Pt. Able to drive   [x]Yes  []No   Pt Fully independent for ADLs/IADLs. [x]Yes  []No    Pt. Required assistance from family for:  []Bathing []Dressing []Cooking []Cleaning  []Laundry  []Other :   Pt. 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 Leisure Interests:  Music, movies, reading, walks; \"I don't know anymore. \"  Medication Management:   Self; Pt. Reports no difficulty. Pt. Only takes meds in the morning. Health Management:  No PCP, Psychiatrist or therapist.  Social Network:  \"no body. \"  Stressors:  Recent death of Mother, Father and Sister. Coping Skills:  Drinking    Pain  [x]Yes  []No  Ratin:10  Location:  Groin, lower back, L abdomen \"They found a mass. \"  Pain Medicine Status: [] Denies need  [] Pain med requested  [x] RN notified. Cognition    A&Ox4,   Patient cecile however states that she feel very irritable and is not sleeping well. Follows []1 step and [x]2 step commands. Upper Extremity ROM:    WFL, pt able to perform all bed mobility, transfers, and gait without ROM limitation. Upper Extremity Strength:    WFL, pt able to perform all bed mobility, transfers, and gait without strength limitation. Upper Extremity Sensation:    No apparent deficits. Upper Extremity Proprioception:    No apparent deficits. Coordination and Tone:  WFL    Balance  Static Sitting:  [x] Good [] Fair [] Poor  Dynamic Sitting:  [x] Good [] Fair [] Poor  Static Standing: [x] Good [] Fair [] Poor  Dynamic Standing: [x] Good [] Fair [] Poor    Bed mobility:  independent  Supine to sit:  Sit to supine:  Scooting to head of bed:  Scooting in sitting:  Rolling:  Bridging:    Transfers:  independent  Sit to stand:  Stand to sit:  Bed/Chair to/from toilet:    Self Care: Toileting:   independent  Grooming:  independent  Dressing:  independent    Exercise / Activities Initiated:   Pt. Educated on role of OT. Pt. Participated in:  Eval, ADL Retraining, medication management, healthy habits/routines, wellness planning, and health management. Assessment of Deficits:   Pt demonstrated decreased activity tolerance, decreased safety awareness, decreased cognition, and decreased ADL/IADL status. Plan to follow up with wellness plan next visit. Pt. Limited during evaluation by decreased cognition. At end of evaluation, pt. In gathering room. Goal(s) : To be met in 3 Visits:  1). Pt. To complete ACLS. 2). Pt. To verbalize understanding of sleep hygiene education. To be met in 5 Visits:  1). Pt. To complete 1 SMART long term goal and 2 SMART short term goals with mod assist.  2). Pt. To verbalize 3 new coping skills. 3). Pt. To complete interest check list.    4). Pt. To verbalize understanding of 3 communication styles. 5).  Pt. To complete wellness plan. 6). Pt. To complete a daily schedule of healthy activities/routines with mod assist.   7). Pt. To identify 2 memory strategies to take medications as prescribed. Rehabilitation Potential:  Good for goals listed above. Strengths for achieving goals include:  PLOF  Barriers to achieving goals include:  Decreased Cognition     Plan: To be seen 2-5x/week while in acute care setting for therapeutic exercises/act, ADL retraining, NMR and patient/caregiver ed/instruction.      Timed Code Treatment Minutes:   16  minutes    Total Treatment Time:    26  minutes    Signature and License Number    García Townsend, OTR/L  #[id number]        If patient discharges from this facility prior to next visit, this note will serve as the Discharge Summary

## 2021-07-20 NOTE — CARE COORDINATION
[facility]  Treatment Team Note  Day 3    Review Date & Time: 0900  7/20/2021    Patient was not in treatment team      Status EXAM:   Status and Exam  Normal: No  Facial Expression: Worried, Sad  Affect: Constricted  Level of Consciousness: Alert  Mood:Normal: No  Mood: Depressed, Anxious, Helpless  Motor Activity:Normal: Yes  Motor Activity: Decreased  Interview Behavior: Cooperative  Preception: [place] to Person, Delia Spar to Time, [place] to Place, [place] to Situation  Attention:Normal: Yes  Thought Processes:  (linear)  Thought Content:Normal: No  Thought Content: Preoccupations  Hallucinations: None  Delusions: No  Memory:Normal: Yes  Insight and Judgment: No  Insight and Judgment: Poor Insight, Poor Judgment  Present Suicidal Ideation: No (can promise to be safe here but not sure about home)  Present Homicidal Ideation: No      Suicide Risk CSSR-S:  1) Within the past month, have you wished you were dead or wished you could go to sleep and not wake up? : Yes  2) Have you actually had any thoughts of killing yourself? : No  6) Have you ever done anything, started to do anything, or prepared to do anything to end your life?: No      PLAN/TREATMENT RECOMMENDATIONS UPDATE: Patient will take medication as prescribed, eat 75% of meals, attend groups, participate in milieu activities, participate in treatment team and care planning for discharge and follow up.       Kimberlyn Hurtado RN
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
## 2021-07-20 NOTE — GROUP NOTE
Group Therapy Note    Date: 7/20/2021    Group Start Time: 1270  Group End Time: 6082  Group Topic: [address] [facility]        Group Therapy Note    Attendees: 5         Patient's Goal:  Patient will complete worksheet on boundaries and will discuss how they apply to mental and emotional wellbeing. Notes:  Patient attended group. Completed the worksheet and discussed in group. Verbalized a basic understanding of boundaries and shared examples of poor and healthy boundaries from life. Received feedback and support from peers. Status After Intervention:  Improved    Participation Level:  Active Listener and Interactive    Participation Quality: Appropriate and Attentive    Speech:  normal    Thought Process/Content: Logical    Affective Functioning: Congruent    Mood: anxious, depressed     Level of consciousness:  Oriented x4    Response to Learning: Able to verbalize current knowledge/experience and Able to verbalize/acknowledge new learning    Endings: None Reported    Modes of Intervention: Education, Support, Socialization and Exploration    Discipline Responsible: /Counselor    Signature:  Akhil Hall MA, Brody Cardona

## 2021-07-20 NOTE — PLAN OF CARE
Problem: Altered Mood, Depressive Behavior:  Goal: Able to verbalize and/or display a decrease in depressive symptoms  Description: Able to verbalize and/or display a decrease in depressive symptoms  7/20/2021 1739 by Billie Benoit LPN  Outcome: Ongoing     Problem: Altered Mood, Depressive Behavior:  Goal: Absence of self-harm  Description: Absence of self-harm  7/20/2021 1739 by Billie Benoit LPN  Outcome: Ongoing   La has been visible and social on milieu. Patient expresses her depression is not any better at this time. Patient currently denies SI/HI/AVH. Yaquelin Kowalski has been medication compliant and has attended groups. Will continue to monitor for safety.

## 2021-07-20 NOTE — GROUP NOTE
Group Therapy Note    Date: 7/20/2021    Group Start Time: 1000  Group End Time: 1100  Group Topic: Cognitive Skills    [address]        Group Therapy Note    Attendees: 6    Group members engaged in a group activity involving identifying negative self talk patterns and transforming patterns into positive \"I\" statements. Group members created individual cut paper snowflakes (an activity requested by one of the group members and agreed on by all others) and wrote their positive \"I\" statements on created snowflakes. Topics of focus included cognitive distortions and ways to change thought patters. Handouts were offered and all group members accepted. Notes:  Octavia Nunn actively engaged in the directive and group discussion. She was able to identify negative self-talk, transform self-talk into positive \"I\" statements and share these statements aloud with the group. Octavia Nunn reported that it \"was good to hear these things out loud, as it challenged the lies she tells herself regularly. \"    Status After Intervention:  Improved    Participation Level:  Active Listener and Interactive    Participation Quality: Appropriate, Attentive and Sharing      Speech:  normal      Thought Process/Content: Logical  Linear      Affective Functioning: Congruent      Mood: anxious and depressed  (decreased)    Level of consciousness:  Alert, Oriented x4 and Attentive      Response to Learning: Able to verbalize current knowledge/experience, Able to verbalize/acknowledge new learning, Able to retain information, Capable of insight and Progressing to goal      Endings: None Reported    Modes of Intervention: Education, Support, Exploration, Clarifying, Problem-solving and Activity      Discipline Responsible: Psychoeducational Specialist      Signature:  David Beard MS, ATR-BC

## 2021-07-20 NOTE — GROUP NOTE
Group Therapy Note    Date: 7/20/2021    Group Start Time: 1105  Group End Time: 1150  Group Topic: Psychotherapy    [facility]    Betsy Logan, [facility]        Group Therapy Note    Attendees: 8         Patient's Goal:  Pt's goal was to focus on herself. Notes:  Pt was engaged in group. Pt shared, gave feedback and support. Pt met goal.     Status After Intervention:  Improved    Participation Level:  Active Listener and Interactive    Participation Quality: Appropriate, Attentive, Sharing and Supportive      Speech:  normal      Thought Process/Content: Logical  Linear      Affective Functioning: Congruent      Mood: anxious and depressed      Level of consciousness:  Alert, Oriented x4 and Attentive      Response to Learning: Able to verbalize current knowledge/experience, Able to verbalize/acknowledge new learning, Able to retain information, Capable of insight, Able to change behavior and Progressing to goal      Endings: None Reported    Modes of Intervention: Education, Support, Socialization, Exploration, Clarifying, Problem-solving, Activity, Movement, Confrontation, Limit-setting and Reality-testing      Discipline Responsible: /Counselor      Signature:  Betsy Logan, [facility]

## 2021-07-21 PROCEDURE — 6370000000 HC RX 637 (ALT 250 FOR IP): Performed by: PHYSICIAN ASSISTANT

## 2021-07-21 PROCEDURE — 99233 SBSQ HOSP IP/OBS HIGH 50: CPT | Performed by: PSYCHIATRY & NEUROLOGY

## 2021-07-21 PROCEDURE — 6370000000 HC RX 637 (ALT 250 FOR IP): Performed by: PSYCHIATRY & NEUROLOGY

## 2021-07-21 PROCEDURE — 1240000000 HC EMOTIONAL WELLNESS R&B

## 2021-07-21 RX ORDER — ATORVASTATIN CALCIUM 10 MG/1
20 TABLET, FILM COATED ORAL NIGHTLY
Status: DISCONTINUED | OUTPATIENT
Start: 2021-07-21 | End: 2021-07-22 | Stop reason: HOSPADM

## 2021-07-21 RX ADMIN — BUSPIRONE HYDROCHLORIDE 10 MG: 10 TABLET ORAL at 16:42

## 2021-07-21 RX ADMIN — AMLODIPINE BESYLATE 2.5 MG: 2.5 TABLET ORAL at 08:54

## 2021-07-21 RX ADMIN — BUSPIRONE HYDROCHLORIDE 10 MG: 10 TABLET ORAL at 20:07

## 2021-07-21 RX ADMIN — QUETIAPINE FUMARATE 25 MG: 25 TABLET ORAL at 20:07

## 2021-07-21 RX ADMIN — QUETIAPINE FUMARATE 12.5 MG: 25 TABLET ORAL at 16:42

## 2021-07-21 RX ADMIN — TRAMADOL HYDROCHLORIDE 50 MG: 50 TABLET, FILM COATED ORAL at 12:46

## 2021-07-21 RX ADMIN — KETOCONAZOLE: 20 CREAM TOPICAL at 08:59

## 2021-07-21 RX ADMIN — BUSPIRONE HYDROCHLORIDE 10 MG: 10 TABLET ORAL at 08:56

## 2021-07-21 RX ADMIN — ATORVASTATIN CALCIUM 20 MG: 10 TABLET, FILM COATED ORAL at 20:07

## 2021-07-21 RX ADMIN — CLONIDINE HYDROCHLORIDE 0.1 MG: 0.1 TABLET ORAL at 20:07

## 2021-07-21 RX ADMIN — DULOXETINE HYDROCHLORIDE 90 MG: 60 CAPSULE, DELAYED RELEASE ORAL at 08:54

## 2021-07-21 RX ADMIN — QUETIAPINE FUMARATE 12.5 MG: 25 TABLET ORAL at 20:10

## 2021-07-21 RX ADMIN — QUETIAPINE FUMARATE 12.5 MG: 25 TABLET ORAL at 08:54

## 2021-07-21 RX ADMIN — CLONIDINE HYDROCHLORIDE 0.1 MG: 0.1 TABLET ORAL at 08:55

## 2021-07-21 RX ADMIN — TRAZODONE HYDROCHLORIDE 50 MG: 50 TABLET ORAL at 22:16

## 2021-07-21 RX ADMIN — KETOCONAZOLE: 20 CREAM TOPICAL at 22:15

## 2021-07-21 ASSESSMENT — PAIN SCALES - GENERAL: PAINLEVEL_OUTOF10: 6

## 2021-07-21 NOTE — GROUP NOTE
Group Therapy Note    Date: 7/21/2021    Group Start Time: 1000  Group End Time: 1100  Group Topic: Art Therapy     [address]        Group Therapy Note    Attendees: 6    Group Members were offered a list of positive quotations. They were encouraged to choose a quote and create a piece of response art based on their chosen quote. Group members were then offered the opportunity to share their quotes and artwork with the group; discussing the relationship the quote has to their life experiences. Group members were given a handout on Gratitude and how practicing gratitude increases positive thinking patterns. Notes:  La displayed positive engagement in the art therapy directive and was able to share her artwork with the group. She chose a quote related to her lester and ability to draw strength from her lester. She engaged in discussion with group members and accepted all handouts offered in group. Status After Intervention:  Improved    Participation Level:  Active Listener and Interactive    Participation Quality: Appropriate, Attentive, Sharing and Supportive      Speech:  normal      Thought Process/Content: Logical  Linear      Affective Functioning: Congruent      Mood: anxious (decreased)      Level of consciousness:  Alert, Oriented x4 and Attentive      Response to Learning: Able to verbalize current knowledge/experience, Able to verbalize/acknowledge new learning, Able to retain information, Capable of insight and Progressing to goal      Endings: None Reported    Modes of Intervention: Education, Exploration and Activity      Discipline Responsible: Psychoeducational Specialist      Signature:  Benito Bird MS, ATR-BC

## 2021-07-21 NOTE — PLAN OF CARE
Problem: Altered Mood, Depressive Behavior:  Goal: Able to verbalize and/or display a decrease in depressive symptoms  Description: Able to verbalize and/or display a decrease in depressive symptoms  7/21/2021 1115 by Coco Rosas LPN  Outcome: Ongoing     Problem: Altered Mood, Depressive Behavior:  Goal: Absence of self-harm  Description: Absence of self-harm  7/21/2021 1115 by Coco Rosas LPN  Outcome: Ongoing   La is brightened and expresses she is feeling more hopeful. Patient states she is looking forward to discharge where she planning to spend sometime with a friend. Patient has been medication compliant and currently denies SI/HI/AVH. Will continue to monitor for safety.

## 2021-07-21 NOTE — FLOWSHEET NOTE
Purposeful Rounding     Patient Location Day room     Patient willing to engage in conversation yes     Presentation/behavior cooperative     Affect Neutral/Euthymic(normal)     Concerns reported: none     PRN medications given:none     Environmental assessment No safety hazards noted     Fall prevention interventions in place: na     Daily [place] Fall Risk Score :77     Daily [place] Fall Risk Score : low

## 2021-07-21 NOTE — GROUP NOTE
Group Therapy Note    Date: 7/21/2021    Group Start Time: 7733  Group End Time: 614 [facility] , MSW        Group Therapy Note    Music Therapy group consisted of game Heads Up. Pts engaged in collaborative decision-making in response to game questions. , providing clues to peers who are guessing responses. Attendees: 7             Notes:  Pt actively engaged with group members, engaging in collaborative socialization and game play throughout. Status After Intervention:  Improved    Participation Level:  Active Listener and Interactive    Participation Quality: Appropriate and Attentive      Speech:  normal      Thought Process/Content: Logical      Affective Functioning: Congruent      Mood: euthymic      Level of consciousness:  Alert and Attentive      Response to Learning: Capable of insight and Progressing to goal      Endings: None Reported    Modes of Intervention: Education, Support, Socialization, Activity and Media      Discipline Responsible: Psychoeducational Specialist      Signature:  Pawel Canas MM, MT-BC

## 2021-07-21 NOTE — GROUP NOTE
Group Therapy Note    Date: 7/20/2021    Group Start Time: 2030  Group End Time: 2100  Group Topic: Wrap-Up    [address] [place] [address]        Group Therapy Note    Attendees: 9  Wrap up group         Patient's Goal:  To go to group    Notes: met goal    Status After Intervention:  Unchanged    Participation Level:  Active Listener    Participation Quality: Appropriate      Speech:  normal      Thought Process/Content: Logical      Affective Functioning: Congruent      Mood: depressed      Level of consciousness:  Alert      Response to Learning: Able to verbalize current knowledge/experience      Endings: None Reported    Modes of Intervention: Support      Discipline Responsible: [address], [employer]      Signature:  Lamont Bradford

## 2021-07-21 NOTE — FLOWSHEET NOTE
Purposeful Rounding     Patient Location Patient room     Patient willing to engage in conversation no, asleep     Presentation/behavior cooperative     Affect Neutral/Euthymic(normal)     Concerns reported: none     PRN medications given:none     Environmental assessment No safety hazards noted     Fall prevention interventions in place: na     Daily [place] Fall Risk Score :77     Daily [place] Fall Risk Score : low

## 2021-07-21 NOTE — PROGRESS NOTES
Occupational Therapy      Attempted OT follow up. Pt. Refused and requested follow up tomorrow.     Eldon Reese, OTR/L  #[id number]

## 2021-07-21 NOTE — PLAN OF CARE
Rash improving to abdomen, continue topical cream     Lipid panel elevated, start atorvastatin 20 mg PO QHS    Nick Scruggs PA-C  7/21/2021 2:50 PM

## 2021-07-21 NOTE — PLAN OF CARE
Problem: Altered Mood, Depressive Behavior:  Goal: Absence of self-harm  Description: Absence of self-harm  7/21/2021 0214 by Francisco Godinez RN  Outcome: Ongoing     Problem: Altered Mood, Depressive Behavior:  Goal: Able to verbalize and/or display a decrease in depressive symptoms  Description: Able to verbalize and/or display a decrease in depressive symptoms  7/21/2021 0214 by Francisco Godinez RN  Outcome: Ongoing  Patient denies SI currently. Reports having fleeting SI earlier in the day but able to use coping skills to distract intrusive thoughts. Visible and social on unit. PRN trazodone given for sleep. PRN tramadol given for pain. Both medications were effective.

## 2021-07-21 NOTE — PROGRESS NOTES
Department of Psychiatry  Progress Note    Patient's chart was reviewed. Discussed with treatment team. Met with patient. SUBJECTIVE:      Reports no improvement sine admission - hasn't slept well because of peers. Tolerated medication changes well. Some insight into substance use disorder. Is now very concerned about nodule in ovary \"I know it's cancer, I might as well hang it up.'    ROS:   Patient has new complaints: no  Sleeping adequately:  No: maintenance    Appetite adequate: Yes  Engaged in programming: yes    OBJECTIVE:  VITALS:  BP (!) 142/92   Pulse 80   Temp 98.7 °F (37.1 °C) (Temporal)   Resp 18   Ht 5' 2\" (1.575 m)   Wt 175 lb (79.4 kg)   SpO2 97%   BMI 32.01 kg/m²     Mental Status Examination:    Appearance: fair grooming and hygiene  Behavior/Attitude toward examiner:  cooperative, attentive and fair eye contact  Speech: low volume. Mood:  \"ok\"  Affect:  blunted     Thought processes:  Goal directed, linear, no JASEN or gross disorganization  Thought Content: less SI, no HI, no delusions voiced, no obsessions  Perceptions: no AVH  Attention: attention span and concentration were intact to interview   Abstraction: intact  Cognition:  Alert and oriented to person, place, time, and situation, recall intact  Insight: fair  Judgment: fair    Medication:  Scheduled:   ketoconazole   Topical BID    amLODIPine  2.5 mg Oral Daily    nicotine  1 patch Transdermal Daily    QUEtiapine  12.5 mg Oral TID    QUEtiapine  25 mg Oral Nightly    DULoxetine  90 mg Oral Daily    busPIRone  10 mg Oral TID    cloNIDine  0.1 mg Oral BID        PRN:  traMADol, acetaminophen, ibuprofen, magnesium hydroxide, hydrOXYzine, traZODone, aluminum & magnesium hydroxide-simethicone, OLANZapine **OR** OLANZapine, sterile water     ASSESSMENT AND PLAN:  48 y.o. female with GERD, anxiety, and depression who presented to [facility] ED with complaint of thoughts of self harm.     Principal Problem: Unspecified mood (affective) disorder (HCC)  Active Problems:    Hypokalemia    Hypertension, essential    Adnexal cyst    Alcohol use disorder, moderate, in controlled environment ([facility] [address]    Cannabis use disorder, moderate, in controlled environment ([facility]    Cocaine use disorder, moderate, in controlled environment [facility])  Resolved Problems:    * No resolved hospital problems. *     1. Admitted to adult psychiatry for stabilization and treatment. 2.On admission, continued quetiapine, increased Cymbalta to 90mg daily, and added Buspar 10mg TID. Ordered q15min checks for safety, programming, and prn medication for anxiety, agitation, and insomnia. 3.Internal medicine consult for admission. Hypokalemia  - replete with diet.      Macrocytosis  - most likely related to alcohol use. - reports increased drinking recently  - monitor for s/s alcohol withdrawal.     Adnexal cyst  - CT recommends prompt follow up  - pelvic US ordered.      Hypertension  - BP elevated. - resumed home Amlodipine, Clonidine  - BP improved. Obesity  - Body mass index is 32.01 kg/m². - Recommended weight loss    4. ELOS 5-8 days. Voluntary. Total time with patient was 35 minutes and more than 50 % of that time was spent counseling the patient on their symptoms, treatment, and expected goals.      Leo Wilson MD

## 2021-07-21 NOTE — FLOWSHEET NOTE
Purposeful Rounding     Patient Location Patient room     Patient willing to engage in conversation no, asleep     Presentation/behavior cooperative     Affect Neutral/Euthymic(normal)     Concerns reported: none     PRN medications given:none     Environmental assessment No safety hazards noted     Fall prevention interventions in place: na     Daily [place] Fall Risk Score :77     Daily Blevins Fall Risk Score : low

## 2021-07-21 NOTE — GROUP NOTE
Group Therapy Note    Date: 7/21/2021    Group Start Time: 1115  Group End Time: 9985  Group Topic: Psychotherapy    [address]        Group Therapy Note    Attendees: 7         Patient's Goal:  Patient was invited to participate in Psychotherapy group and to set a goal at the beginning of session to practice in group a new way of being in relationships. Notes:  La shared her goal was to [place] the way I am and hold this positive. \" Remi Christianson was an attentive listener and participant in group discussion and gave supportive feedback to others. Status After Intervention:  Improved    Participation Level:  Active Listener and Interactive    Participation Quality: Appropriate, Attentive, Sharing and Supportive      Speech:  normal      Thought Process/Content: Logical      Affective Functioning: Blunted      Mood: anxious      Level of consciousness:  Alert, Oriented x4 and Attentive      Response to Learning: Able to verbalize current knowledge/experience, Able to verbalize/acknowledge new learning, Able to retain information and Capable of insight      Endings: None Reported    Modes of Intervention: Education, Support, Socialization, Exploration, Clarifying, Problem-solving and Activity      Discipline Responsible: Psychoeducational Specialist      Signature:  Randee Ibrahim

## 2021-07-21 NOTE — PROGRESS NOTES
Department of Psychiatry  Progress Note    Patient's chart was reviewed. Discussed with treatment team. Met with patient. SUBJECTIVE:       Reports feeling better today - \"almost myself again. \"    Mood improved; SI resolving. Tolerated medication changes well. Plan to stay with a friend after discharge and looks forward to it. ROS:   Patient has new complaints: no  Sleeping adequately:  No: maintenance    Appetite adequate: Yes  Engaged in programming: yes    OBJECTIVE:  VITALS:  BP (!) 137/94   Pulse 62   Temp 96.6 °F (35.9 °C) (Temporal)   Resp 20   Ht 5' 2\" (1.575 m)   Wt 175 lb (79.4 kg)   SpO2 97%   BMI 32.01 kg/m²     Mental Status Examination:    Appearance: improved grooming and hygiene  Behavior/Attitude toward examiner:  cooperative, attentive and improved eye contact  Speech: low volume. Mood:  \"better\"  Affect:  improved    Thought processes:  Goal directed, linear, no JASEN or gross disorganization  Thought Content: less SI, no HI, no delusions voiced, no obsessions  Perceptions: no AVH  Attention: attention span and concentration were intact to interview   Abstraction: intact  Cognition:  Alert and oriented to person, place, time, and situation, recall intact  Insight: improved  Judgment: improving     Medication:  Scheduled:   ketoconazole   Topical BID    amLODIPine  2.5 mg Oral Daily    nicotine  1 patch Transdermal Daily    QUEtiapine  12.5 mg Oral TID    QUEtiapine  25 mg Oral Nightly    DULoxetine  90 mg Oral Daily    busPIRone  10 mg Oral TID    cloNIDine  0.1 mg Oral BID        PRN:  traMADol, acetaminophen, ibuprofen, magnesium hydroxide, hydrOXYzine, traZODone, aluminum & magnesium hydroxide-simethicone, OLANZapine **OR** OLANZapine, sterile water     ASSESSMENT AND PLAN:  48 y.o. female with GERD, anxiety, and depression who presented to [facility] ED with complaint of thoughts of self harm.     Principal Problem:    Unspecified mood (affective) disorder [facility])  Active Problems:    Hypokalemia    Hypertension, essential    Adnexal cyst    Alcohol use disorder, moderate, in controlled environment ([facility] [address]    Cannabis use disorder, moderate, in controlled environment ([facility]    Cocaine use disorder, moderate, in controlled environment [facility])  Resolved Problems:    * No resolved hospital problems. *     1. Admitted to adult psychiatry for stabilization and treatment. 2.On admission, continued quetiapine, increased Cymbalta to 90mg daily, and added Buspar 10mg TID. Ordered q15min checks for safety, programming, and prn medication for anxiety, agitation, and insomnia. 3.Internal medicine consult for admission. Hypokalemia  - replete with diet.      Macrocytosis  - most likely related to alcohol use. - reports increased drinking recently  - monitor for s/s alcohol withdrawal.     Adnexal cyst  - CT recommends prompt follow up  - pelvic US ordered.      Hypertension  - BP elevated. - resumed home Amlodipine, Clonidine  - BP improved. Obesity  - Body mass index is 32.01 kg/m². - Recommended weight loss    4. ELOS 5-8 days. Voluntary. Target DC tomorrow. Total time with patient was 35 minutes and more than 50 % of that time was spent counseling the patient on their symptoms, treatment, and expected goals.      Alina Lozada MD

## 2021-07-22 VITALS
TEMPERATURE: 98.6 F | OXYGEN SATURATION: 98 % | DIASTOLIC BLOOD PRESSURE: 86 MMHG | RESPIRATION RATE: 16 BRPM | HEART RATE: 84 BPM | BODY MASS INDEX: 32.2 KG/M2 | WEIGHT: 175 LBS | HEIGHT: 62 IN | SYSTOLIC BLOOD PRESSURE: 134 MMHG

## 2021-07-22 PROCEDURE — 99239 HOSP IP/OBS DSCHRG MGMT >30: CPT | Performed by: PSYCHIATRY & NEUROLOGY

## 2021-07-22 PROCEDURE — 5130000000 HC BRIDGE APPOINTMENT

## 2021-07-22 PROCEDURE — 6370000000 HC RX 637 (ALT 250 FOR IP): Performed by: PHYSICIAN ASSISTANT

## 2021-07-22 PROCEDURE — 6370000000 HC RX 637 (ALT 250 FOR IP): Performed by: PSYCHIATRY & NEUROLOGY

## 2021-07-22 RX ORDER — QUETIAPINE FUMARATE 25 MG/1
25 TABLET, FILM COATED ORAL NIGHTLY
Qty: 30 TABLET | Refills: 0 | Status: ON HOLD | OUTPATIENT
Start: 2021-07-22 | End: 2021-10-18

## 2021-07-22 RX ORDER — ATORVASTATIN CALCIUM 20 MG/1
20 TABLET, FILM COATED ORAL NIGHTLY
Qty: 30 TABLET | Refills: 0 | Status: ON HOLD | OUTPATIENT
Start: 2021-07-22 | End: 2021-10-18 | Stop reason: HOSPADM

## 2021-07-22 RX ORDER — QUETIAPINE FUMARATE 25 MG/1
12.5 TABLET, FILM COATED ORAL 3 TIMES DAILY
Qty: 45 TABLET | Refills: 0 | Status: ON HOLD | OUTPATIENT
Start: 2021-07-22 | End: 2021-10-15

## 2021-07-22 RX ORDER — DULOXETIN HYDROCHLORIDE 30 MG/1
90 CAPSULE, DELAYED RELEASE ORAL DAILY
Qty: 90 CAPSULE | Refills: 0 | Status: ON HOLD | OUTPATIENT
Start: 2021-07-23 | End: 2021-10-18

## 2021-07-22 RX ORDER — TRAZODONE HYDROCHLORIDE 50 MG/1
50 TABLET ORAL NIGHTLY PRN
Qty: 20 TABLET | Refills: 0 | Status: ON HOLD | OUTPATIENT
Start: 2021-07-22 | End: 2021-10-15

## 2021-07-22 RX ORDER — BUSPIRONE HYDROCHLORIDE 10 MG/1
10 TABLET ORAL 3 TIMES DAILY
Qty: 90 TABLET | Refills: 0 | Status: SHIPPED | OUTPATIENT
Start: 2021-07-22 | End: 2021-08-21

## 2021-07-22 RX ADMIN — QUETIAPINE FUMARATE 12.5 MG: 25 TABLET ORAL at 09:00

## 2021-07-22 RX ADMIN — DULOXETINE HYDROCHLORIDE 90 MG: 60 CAPSULE, DELAYED RELEASE ORAL at 08:59

## 2021-07-22 RX ADMIN — TRAMADOL HYDROCHLORIDE 50 MG: 50 TABLET, FILM COATED ORAL at 09:12

## 2021-07-22 RX ADMIN — BUSPIRONE HYDROCHLORIDE 10 MG: 10 TABLET ORAL at 09:01

## 2021-07-22 RX ADMIN — CLONIDINE HYDROCHLORIDE 0.1 MG: 0.1 TABLET ORAL at 09:01

## 2021-07-22 RX ADMIN — AMLODIPINE BESYLATE 2.5 MG: 2.5 TABLET ORAL at 09:00

## 2021-07-22 ASSESSMENT — PAIN DESCRIPTION - ORIENTATION: ORIENTATION: LEFT

## 2021-07-22 ASSESSMENT — PAIN DESCRIPTION - PROGRESSION: CLINICAL_PROGRESSION: GRADUALLY WORSENING

## 2021-07-22 ASSESSMENT — PAIN SCALES - GENERAL
PAINLEVEL_OUTOF10: 5
PAINLEVEL_OUTOF10: 0
PAINLEVEL_OUTOF10: 5

## 2021-07-22 ASSESSMENT — PAIN DESCRIPTION - DESCRIPTORS: DESCRIPTORS: ACHING

## 2021-07-22 ASSESSMENT — PAIN DESCRIPTION - FREQUENCY: FREQUENCY: INTERMITTENT

## 2021-07-22 ASSESSMENT — PAIN DESCRIPTION - ONSET: ONSET: GRADUAL

## 2021-07-22 ASSESSMENT — PAIN DESCRIPTION - LOCATION: LOCATION: BACK

## 2021-07-22 ASSESSMENT — PAIN - FUNCTIONAL ASSESSMENT: PAIN_FUNCTIONAL_ASSESSMENT: ACTIVITIES ARE NOT PREVENTED

## 2021-07-22 ASSESSMENT — PAIN DESCRIPTION - PAIN TYPE: TYPE: CHRONIC PAIN

## 2021-07-22 NOTE — FLOWSHEET NOTE
Purposeful Rounding     Patient Location Day room     Patient willing to engage in conversationYes     Presentation/behavior Cooperative and Pleasant     Affect Brightened     Concerns reported: None     PRN medications given:None     Environmental assessment No safety hazards noted     Fall prevention interventions in place: Yellow non-skid socks on     Daily Jaiden Fall Risk Score :73     Daily Blevins Fall Risk Score : 0

## 2021-07-22 NOTE — PLAN OF CARE
Problem: Pain:  Goal: Pain level will decrease  Description: Pain level will decrease  Outcome: Ongoing     Problem: Altered Mood, Depressive Behavior:  Goal: Able to verbalize and/or display a decrease in depressive symptoms  Description: Able to verbalize and/or display a decrease in depressive symptoms  7/21/2021 6807 by Terrance Henderson RN  Outcome: Ongoing  7/21/2021 1115 by Poornima Saunders LPN  Outcome: Ongoing       Pt is visible and social on unit. She is alert and oriented bright and A&O X4. She denies all. She is excited about possible d/c tomorrow. Plans to spend time with a long time friend for the next couple of weeks while she takes time off work. Pt states her family is very dysfunctional but she lost two parents in 2020 and found out recently she lost her sister and \"lost it\". Pt feels much better now stating she is showering and performing daily cares independently. +meds. +group.

## 2021-07-22 NOTE — PLAN OF CARE
Problem: Altered Mood, Depressive Behavior:  Goal: Able to verbalize acceptance of life and situations over which he or she has no control  7/22/2021 1019 by Marga Phillips RN  Outcome: Ongoing  Note: 1:1 Assessment, see flow sheet. Patient is alert and oriented x4. Patient has been visible on the unit this shift and is dressed appropriately in street clothing. Patient denied SI/HI/AVH. The patient reported her mood as \"I am great, I am going home today\". Patient did attend group this shift and participated appropriately. Compliant with medications. Received tramadol for complaints of left lower abdominal pain that radiates to back/flank area, effective. Vital signs are stable.  Safety checks  Q 15 minutes throughout the shift.    7/21/2021 0967 by Luc Mccoy RN  Outcome: Ongoing

## 2021-07-22 NOTE — GROUP NOTE
Group Therapy Note    Date: 7/22/2021    Group Start Time: 1000  Group End Time: 1100  Group Topic: Cognitive Skills    [address]        Group Therapy Note    Attendees: 11    Group members engaged in group discussion focusing on self-care. Group members were encouraged to identify self-care methods in the areas of Physical, Mental, Emotional, Social, Financial and Spiritual areas in an effort to better manage stress and anxiety. A handout pertaining to stress cycle and coping was given at the end of group. Notes:  La displayed positive engagement in group. She actively listened to presented material and accepted handouts at the end of group. Status After Intervention:  Improved    Participation Level:  Active Listener and Interactive    Participation Quality: Appropriate and Attentive      Speech:  normal      Thought Process/Content: Logical  Linear      Affective Functioning: Congruent      Mood: anxious      Level of consciousness:  Alert, Oriented x4 and Attentive      Response to Learning: Able to verbalize current knowledge/experience, Able to verbalize/acknowledge new learning, Able to retain information and Progressing to goal      Endings: None Reported    Modes of Intervention: Education, Clarifying, Problem-solving and Activity      Discipline Responsible: Psychoeducational Specialist      Signature:  David Beard MS, ATR-BC

## 2021-07-22 NOTE — BH NOTE
[facility]  Discharge Note    Pt discharged with followings belongings:   Dentures: None  Vision - Corrective Lenses: Glasses (readers)  Hearing Aid: None  Jewelry: Ring (3 credit cards, 's lic)  Body Piercings Removed: N/A  Clothing: Footwear, Pants, Shirt, Undergarments (Comment)  Were All Patient Medications Collected?: Yes  Other Valuables: Cell phone, Purse, [employer] 1923, Keys, Money (Comment), Other (Comment) (several bottles of home meds and naprosyn tabsl)   Valuables returned to patient. Patient education on aftercare instructions: yes  Information faxed by Jean Claude Vazquez  at 1:34 PM .Patient verbalize understanding of AVS:  yes.     Status EXAM upon discharge:  Status and Exam  Normal: No  Facial Expression: Worried, Brightened  Affect: Appropriate, Congruent, Stable  Level of Consciousness: Alert  Mood:Normal: No  Mood: Anxious, Irritable (one episode of irritability, reported she was \"nervous\" )  Motor Activity:Normal: Yes  Motor Activity: Decreased  Interview Behavior: Cooperative  Preception: [place] to Person, [place] to Time, [place] to Place, [place] to Situation  Attention:Normal: Yes  Thought Processes:  (linear)  Thought Content:Normal: No  Thought Content: Preoccupations  Hallucinations: None  Delusions: No  Memory:Normal: Yes  Insight and Judgment: Yes  Insight and Judgment:  (judgment and insight improved since admission)  Present Suicidal Ideation: No  Present Homicidal Ideation: No      Metabolic Screening:    Lab Results   Component Value Date    LABA1C 5.5 07/18/2021       Lab Results   Component Value Date    CHOL 227 (H) 07/18/2021    CHOL 199 11/28/2020    CHOL 203 (H) 02/02/2010     Lab Results   Component Value Date    TRIG 132 07/18/2021    TRIG 95 11/28/2020    TRIG 94 02/02/2010     Lab Results   Component Value Date    HDL 78 (H) 07/18/2021    HDL 73 (H) 11/28/2020    HDL 57 02/02/2010     No components found for: LDLCAL  Lab Results   Component Value Date    LABVLDL 26 07/18/2021    LABVLDL 19 11/28/2020    LABVLDL 19 02/02/2010       Bridge Appointment completed: Reviewed Discharge Instructions with patient. Patient verbalizes understanding and agreement with the discharge plan using the teachback method.      Referral for Outpatient Tobacco Cessation Counseling, upon discharge (jacobo X if applicable and completed):    ( )  Hospital staff assisted patient to call Quit Line or faxed referral                                   during hospitalization                  ( )  Recognizing danger situations (included triggers and roadblocks), if not completed on admission                    ( )  Coping skills (new ways to manage stress, exercise, relaxation techniques, changing routine, distraction), if not completed on admission                                                           ( )  Basic information about quitting (benefits of quitting, techniques in how to quit, available resources, if not completed on admission  ( ) Referral for counseling faxed to [facility]   (x ) Patient refused referral  (x ) Patient refused counseling  (x ) Patient refused smoking cessation medication upon discharge    Vaccinations (jacobo X if applicable and completed):  ( ) Patient states already received influenza vaccine elsewhere  ( ) Patient received influenza vaccine during this hospitalization  ( x) Patient refused influenza vaccine at this time

## 2021-07-22 NOTE — SUICIDE SAFETY PLAN
SAFETY PLAN    A suicide Safety Plan is a document that supports someone when they are having thoughts of suicide. Warning Signs that indicate a suicidal crisis may be developing: What (situations, thoughts, feelings, body sensations, behaviors, etc.) do you experience that lets you know you are beginning to think about suicide? 1. Thoughts  2. mood  3. behavior    Internal Coping Strategies:  What things can I do (relaxation techniques, hobbies, physical activities, etc.) to take my mind off my problems without contacting another person? 1. Work  2. Talk  3. Walk    People and social settings that provide distraction: Who can I call or where can I go to distract me? 1. Name: Tavares  Phone:   2. Name: [place]  Phone:    3. Place: Park            4. Place: mall    People whom I can ask for help: Who can I call when I need help - for example, friends, family, clergy, someone else? 1. Name: Tavares                Phone:   2. Name: [place]  Phone:   3. Name: [employer]  Phone:     Professionals or [employer] agencies I can contact during a crisis: Who can I call for help - for example, my doctor, my psychiatrist, my psychologist, a mental health provider, a suicide hotline? Suicide Prevention Lifeline: 2-884-852-TALK (0738)      Making the environment safe: How can I make my environment (house/apartment/living space) safer? For example, can I remove guns, medications, and other items? 1.  Ask for help      Something worth living for:  Linda Carter

## 2021-07-22 NOTE — PROGRESS NOTES
Purposeful Rounding     Patient Location pt room     Patient willing to engage in conversation sleeping     Presentation/behavior sleeping     Affect sleeping     Concerns reported: n/a     PRN medications given: none     Environmental assessment Room free from clutter and No safety hazards noted     Fall prevention interventions in place: Yellow non-skid socks on     Daily [place] Fall Risk Score : 73     Daily Blevins Fall Risk Score : low

## 2021-07-22 NOTE — GROUP NOTE
Group Therapy Note    Date: 7/21/2021    Group Start Time: 2045  Group End Time: 2100  Group Topic: [address] Sus[address], RN        Group Therapy Note    Attendees: 10         Patient's Goal:  Stay in a positive mood today    Notes:  Pt reports she was able to do this today. Status After Intervention:  Improved    Participation Level:  Active Listener    Participation Quality: Appropriate      Speech:  normal      Thought Process/Content: Logical  Linear      Affective Functioning: Congruent      Mood: WNL      Level of consciousness:  Alert and Oriented x4      Response to Learning: Able to verbalize current knowledge/experience      Endings: None Reported    Modes of Intervention: Socialization      Discipline Responsible: Registered Nurse      Signature:  Charlene Grossman RN

## 2021-07-22 NOTE — PROGRESS NOTES
Purposeful Rounding    Patient Location Day room    Patient willing to engage in conversationYes    Presentation/behavior euthmic    Affect Neutral/Euthymic(normal)    Concerns reported: n/a    PRN medications given: none    Environmental assessment Room free from clutter and No safety hazards noted    Fall prevention interventions in place: Yellow non-skid socks on    Daily Jaiden Fall Risk Score : 73    Daily Blevnis Fall Risk Score : low

## 2021-07-22 NOTE — FLOWSHEET NOTE
Purposeful Rounding    Patient Location Day room    Patient willing to engage in conversationYes    Presentation/behavior Cooperative and Pleasant    Affect Brightened    Concerns reported: None    PRN medications given:None    Environmental assessment No safety hazards noted    Fall prevention interventions in place: Yellow non-skid socks on    Daily [place] Fall Risk Score :73    Daily Blevins Fall Risk Score : 0

## 2021-07-22 NOTE — PROGRESS NOTES
Purposef ul Rounding     Patient Location pt room     Patient willing to engage in conversation sleeping     Presentation/behavior sleeping     Affect sleeping     Concerns reported: n/a     PRN medications given: none     Environmental assessment Room free from clutter and No safety hazards noted     Fall prevention interventions in place: Yellow non-skid socks on     Daily Jaiden Fall Risk Score : 73     Daily Blevins Fall Risk Score : low

## 2021-08-10 NOTE — ED PROVIDER NOTES
This patient was seen by the PA and physician on the previous shift. She has been medically cleared and is behavioral holding awaiting psychiatric evaluation. The patient was later deemed to be appropriate for admission by the psychiatry service and admitted for further care. Based on signout I never saw or performed my own medical evaluation on this patient.      Whit Candelario MD  08/10/21 0526
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
## 2021-10-14 ENCOUNTER — HOSPITAL ENCOUNTER (INPATIENT)
Age: 56
LOS: 4 days | Discharge: HOME | DRG: 885 | End: 2021-10-18
Attending: PSYCHIATRY & NEUROLOGY | Admitting: PSYCHIATRY & NEUROLOGY

## 2021-10-14 ENCOUNTER — HOSPITAL ENCOUNTER (EMERGENCY)
Age: 56
Discharge: PSYCHIATRIC OR SUBSTANCE USE FACILITY | End: 2021-10-14
Attending: EMERGENCY MEDICINE

## 2021-10-14 VITALS
RESPIRATION RATE: 18 BRPM | BODY MASS INDEX: 31.1 KG/M2 | TEMPERATURE: 97 F | HEART RATE: 83 BPM | HEIGHT: 64 IN | WEIGHT: 182.19 LBS | DIASTOLIC BLOOD PRESSURE: 92 MMHG | OXYGEN SATURATION: 97 % | SYSTOLIC BLOOD PRESSURE: 147 MMHG

## 2021-10-14 DIAGNOSIS — F39 MOOD DISORDER (HCC): Primary | ICD-10-CM

## 2021-10-14 PROBLEM — F33.9 MDD (MAJOR DEPRESSIVE DISORDER), RECURRENT EPISODE (HCC): Status: ACTIVE | Noted: 2021-10-14

## 2021-10-14 LAB
A/G RATIO: 1.4 (ref 1.1–2.2)
ACETAMINOPHEN LEVEL: <5 UG/ML (ref 10–30)
ALBUMIN SERPL-MCNC: 4.5 G/DL (ref 3.4–5)
ALP BLD-CCNC: 141 U/L (ref 40–129)
ALT SERPL-CCNC: 20 U/L (ref 10–40)
AMPHETAMINE SCREEN, URINE: ABNORMAL
ANION GAP SERPL CALCULATED.3IONS-SCNC: 14 MMOL/L (ref 3–16)
AST SERPL-CCNC: 17 U/L (ref 15–37)
BACTERIA: ABNORMAL /HPF
BARBITURATE SCREEN URINE: ABNORMAL
BASOPHILS ABSOLUTE: 0.1 K/UL (ref 0–0.2)
BASOPHILS RELATIVE PERCENT: 1.1 %
BENZODIAZEPINE SCREEN, URINE: ABNORMAL
BILIRUB SERPL-MCNC: 0.9 MG/DL (ref 0–1)
BILIRUBIN URINE: NEGATIVE
BLOOD, URINE: NEGATIVE
BUN BLDV-MCNC: 8 MG/DL (ref 7–20)
CALCIUM SERPL-MCNC: 10.1 MG/DL (ref 8.3–10.6)
CANNABINOID SCREEN URINE: POSITIVE
CHLORIDE BLD-SCNC: 97 MMOL/L (ref 99–110)
CLARITY: ABNORMAL
CO2: 23 MMOL/L (ref 21–32)
COCAINE METABOLITE SCREEN URINE: ABNORMAL
COLOR: YELLOW
CREAT SERPL-MCNC: 0.6 MG/DL (ref 0.6–1.1)
EOSINOPHILS ABSOLUTE: 0.1 K/UL (ref 0–0.6)
EOSINOPHILS RELATIVE PERCENT: 0.6 %
EPITHELIAL CELLS, UA: >36 /HPF (ref 0–5)
ETHANOL: 106 MG/DL (ref 0–0.08)
ETHANOL: NORMAL MG/DL (ref 0–0.08)
GFR AFRICAN AMERICAN: >60
GFR NON-AFRICAN AMERICAN: >60
GLOBULIN: 3.2 G/DL
GLUCOSE BLD-MCNC: 90 MG/DL (ref 70–99)
GLUCOSE URINE: NEGATIVE MG/DL
HCG QUALITATIVE: NEGATIVE
HCT VFR BLD CALC: 43.8 % (ref 36–48)
HEMOGLOBIN: 15 G/DL (ref 12–16)
HYALINE CASTS: 3 /LPF (ref 0–8)
KETONES, URINE: NEGATIVE MG/DL
LEUKOCYTE ESTERASE, URINE: ABNORMAL
LYMPHOCYTES ABSOLUTE: 2.7 K/UL (ref 1–5.1)
LYMPHOCYTES RELATIVE PERCENT: 26 %
Lab: ABNORMAL
MAGNESIUM: 2 MG/DL (ref 1.8–2.4)
MCH RBC QN AUTO: 34.7 PG (ref 26–34)
MCHC RBC AUTO-ENTMCNC: 34.3 G/DL (ref 31–36)
MCV RBC AUTO: 101.1 FL (ref 80–100)
METHADONE SCREEN, URINE: ABNORMAL
MICROSCOPIC EXAMINATION: YES
MONOCYTES ABSOLUTE: 0.6 K/UL (ref 0–1.3)
MONOCYTES RELATIVE PERCENT: 5.5 %
NEUTROPHILS ABSOLUTE: 6.9 K/UL (ref 1.7–7.7)
NEUTROPHILS RELATIVE PERCENT: 66.8 %
NITRITE, URINE: NEGATIVE
OPIATE SCREEN URINE: ABNORMAL
OXYCODONE URINE: ABNORMAL
PDW BLD-RTO: 13.1 % (ref 12.4–15.4)
PH UA: 6
PH UA: 6 (ref 5–8)
PHENCYCLIDINE SCREEN URINE: ABNORMAL
PLATELET # BLD: 348 K/UL (ref 135–450)
PMV BLD AUTO: 8.4 FL (ref 5–10.5)
POTASSIUM REFLEX MAGNESIUM: 3.5 MMOL/L (ref 3.5–5.1)
PROPOXYPHENE SCREEN: ABNORMAL
PROTEIN UA: NEGATIVE MG/DL
RBC # BLD: 4.33 M/UL (ref 4–5.2)
RBC UA: 1 /HPF (ref 0–4)
SALICYLATE, SERUM: <0.3 MG/DL (ref 15–30)
SARS-COV-2, NAAT: NOT DETECTED
SODIUM BLD-SCNC: 134 MMOL/L (ref 136–145)
SPECIFIC GRAVITY UA: 1 (ref 1–1.03)
TOTAL PROTEIN: 7.7 G/DL (ref 6.4–8.2)
URINE REFLEX TO CULTURE: YES
URINE TYPE: ABNORMAL
UROBILINOGEN, URINE: 0.2 E.U./DL
WBC # BLD: 10.4 K/UL (ref 4–11)
WBC UA: 10 /HPF (ref 0–5)

## 2021-10-14 PROCEDURE — 82077 ASSAY SPEC XCP UR&BREATH IA: CPT

## 2021-10-14 PROCEDURE — 36415 COLL VENOUS BLD VENIPUNCTURE: CPT

## 2021-10-14 PROCEDURE — 80179 DRUG ASSAY SALICYLATE: CPT

## 2021-10-14 PROCEDURE — 83735 ASSAY OF MAGNESIUM: CPT

## 2021-10-14 PROCEDURE — 87086 URINE CULTURE/COLONY COUNT: CPT

## 2021-10-14 PROCEDURE — 99285 EMERGENCY DEPT VISIT HI MDM: CPT

## 2021-10-14 PROCEDURE — 87635 SARS-COV-2 COVID-19 AMP PRB: CPT

## 2021-10-14 PROCEDURE — 84703 CHORIONIC GONADOTROPIN ASSAY: CPT

## 2021-10-14 PROCEDURE — 6370000000 HC RX 637 (ALT 250 FOR IP): Performed by: EMERGENCY MEDICINE

## 2021-10-14 PROCEDURE — 81001 URINALYSIS AUTO W/SCOPE: CPT

## 2021-10-14 PROCEDURE — 1240000000 HC EMOTIONAL WELLNESS R&B

## 2021-10-14 PROCEDURE — 85025 COMPLETE CBC W/AUTO DIFF WBC: CPT

## 2021-10-14 PROCEDURE — 80053 COMPREHEN METABOLIC PANEL: CPT

## 2021-10-14 PROCEDURE — 80143 DRUG ASSAY ACETAMINOPHEN: CPT

## 2021-10-14 PROCEDURE — 80307 DRUG TEST PRSMV CHEM ANLYZR: CPT

## 2021-10-14 RX ORDER — LORAZEPAM 1 MG/1
2 TABLET ORAL ONCE
Status: COMPLETED | OUTPATIENT
Start: 2021-10-14 | End: 2021-10-14

## 2021-10-14 RX ORDER — IBUPROFEN 400 MG/1
400 TABLET ORAL ONCE
Status: COMPLETED | OUTPATIENT
Start: 2021-10-14 | End: 2021-10-14

## 2021-10-14 RX ORDER — ACETAMINOPHEN 325 MG/1
650 TABLET ORAL ONCE
Status: COMPLETED | OUTPATIENT
Start: 2021-10-14 | End: 2021-10-14

## 2021-10-14 RX ADMIN — LORAZEPAM 2 MG: 1 TABLET ORAL at 15:20

## 2021-10-14 RX ADMIN — IBUPROFEN 400 MG: 400 TABLET, FILM COATED ORAL at 14:10

## 2021-10-14 RX ADMIN — ACETAMINOPHEN 650 MG: 325 TABLET ORAL at 14:10

## 2021-10-14 ASSESSMENT — PAIN DESCRIPTION - DESCRIPTORS: DESCRIPTORS: ACHING

## 2021-10-14 ASSESSMENT — PAIN SCALES - GENERAL
PAINLEVEL_OUTOF10: 9
PAINLEVEL_OUTOF10: 0
PAINLEVEL_OUTOF10: 9

## 2021-10-14 ASSESSMENT — PAIN DESCRIPTION - FREQUENCY: FREQUENCY: CONTINUOUS

## 2021-10-14 ASSESSMENT — PAIN DESCRIPTION - LOCATION: LOCATION: MOUTH

## 2021-10-14 ASSESSMENT — PAIN DESCRIPTION - ORIENTATION: ORIENTATION: RIGHT;UPPER
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
## 2021-10-14 NOTE — ED NOTES
Report received from out going RN. Pt resting in bed with sitter at bedside, she is calm and  States she feels safe  and deneis and SI at this time. No needs voiced,  Pt updated on POC. VSS, please see flow sheets for full set.      Jeanette Horowitz RN  10/14/21 3032
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
## 2021-10-15 PROBLEM — F12.10 CANNABIS ABUSE: Status: ACTIVE | Noted: 2021-07-19

## 2021-10-15 PROBLEM — F14.21 COCAINE USE DISORDER, MODERATE, IN EARLY REMISSION (HCC): Status: ACTIVE | Noted: 2021-07-19

## 2021-10-15 LAB — URINE CULTURE, ROUTINE: NORMAL

## 2021-10-15 PROCEDURE — 6370000000 HC RX 637 (ALT 250 FOR IP): Performed by: PHYSICIAN ASSISTANT

## 2021-10-15 PROCEDURE — 6370000000 HC RX 637 (ALT 250 FOR IP): Performed by: PSYCHIATRY & NEUROLOGY

## 2021-10-15 PROCEDURE — 99223 1ST HOSP IP/OBS HIGH 75: CPT | Performed by: PSYCHIATRY & NEUROLOGY

## 2021-10-15 PROCEDURE — 99223 1ST HOSP IP/OBS HIGH 75: CPT | Performed by: PHYSICIAN ASSISTANT

## 2021-10-15 PROCEDURE — 1240000000 HC EMOTIONAL WELLNESS R&B

## 2021-10-15 RX ORDER — AMLODIPINE BESYLATE 2.5 MG/1
2.5 TABLET ORAL DAILY
Status: DISCONTINUED | OUTPATIENT
Start: 2021-10-15 | End: 2021-10-18 | Stop reason: HOSPADM

## 2021-10-15 RX ORDER — POLYETHYLENE GLYCOL 3350 17 G
2 POWDER IN PACKET (EA) ORAL
Status: DISCONTINUED | OUTPATIENT
Start: 2021-10-15 | End: 2021-10-18 | Stop reason: HOSPADM

## 2021-10-15 RX ORDER — LACTOBACILLUS RHAMNOSUS GG 10B CELL
1 CAPSULE ORAL
Status: DISCONTINUED | OUTPATIENT
Start: 2021-10-16 | End: 2021-10-18 | Stop reason: HOSPADM

## 2021-10-15 RX ORDER — ALBUTEROL SULFATE 90 UG/1
2 AEROSOL, METERED RESPIRATORY (INHALATION) EVERY 6 HOURS PRN
Status: DISCONTINUED | OUTPATIENT
Start: 2021-10-15 | End: 2021-10-18 | Stop reason: HOSPADM

## 2021-10-15 RX ORDER — ACETAMINOPHEN 325 MG/1
650 TABLET ORAL EVERY 4 HOURS PRN
Status: DISCONTINUED | OUTPATIENT
Start: 2021-10-15 | End: 2021-10-15

## 2021-10-15 RX ORDER — ACETAMINOPHEN 325 MG/1
650 TABLET ORAL EVERY 4 HOURS PRN
Status: DISCONTINUED | OUTPATIENT
Start: 2021-10-15 | End: 2021-10-18 | Stop reason: HOSPADM

## 2021-10-15 RX ORDER — FLUCONAZOLE 100 MG/1
150 TABLET ORAL ONCE
Status: COMPLETED | OUTPATIENT
Start: 2021-10-15 | End: 2021-10-15

## 2021-10-15 RX ORDER — HYDROXYZINE PAMOATE 25 MG/1
50 CAPSULE ORAL 3 TIMES DAILY PRN
Status: DISCONTINUED | OUTPATIENT
Start: 2021-10-15 | End: 2021-10-18 | Stop reason: HOSPADM

## 2021-10-15 RX ORDER — CLONIDINE HYDROCHLORIDE 0.1 MG/1
0.1 TABLET ORAL 2 TIMES DAILY
Status: DISCONTINUED | OUTPATIENT
Start: 2021-10-15 | End: 2021-10-18 | Stop reason: HOSPADM

## 2021-10-15 RX ORDER — QUETIAPINE FUMARATE 25 MG/1
25 TABLET, FILM COATED ORAL NIGHTLY
Status: DISCONTINUED | OUTPATIENT
Start: 2021-10-15 | End: 2021-10-18 | Stop reason: HOSPADM

## 2021-10-15 RX ORDER — QUETIAPINE FUMARATE 25 MG/1
50 TABLET, FILM COATED ORAL 2 TIMES DAILY PRN
Status: DISCONTINUED | OUTPATIENT
Start: 2021-10-15 | End: 2021-10-18 | Stop reason: HOSPADM

## 2021-10-15 RX ORDER — AMOXICILLIN AND CLAVULANATE POTASSIUM 875; 125 MG/1; MG/1
1 TABLET, FILM COATED ORAL EVERY 12 HOURS SCHEDULED
Status: DISCONTINUED | OUTPATIENT
Start: 2021-10-15 | End: 2021-10-18 | Stop reason: HOSPADM

## 2021-10-15 RX ORDER — NICOTINE 21 MG/24HR
1 PATCH, TRANSDERMAL 24 HOURS TRANSDERMAL DAILY
Status: DISCONTINUED | OUTPATIENT
Start: 2021-10-15 | End: 2021-10-18 | Stop reason: HOSPADM

## 2021-10-15 RX ORDER — TRAZODONE HYDROCHLORIDE 50 MG/1
50 TABLET ORAL NIGHTLY PRN
Status: DISCONTINUED | OUTPATIENT
Start: 2021-10-15 | End: 2021-10-18 | Stop reason: HOSPADM

## 2021-10-15 RX ADMIN — ACETAMINOPHEN 650 MG: 325 TABLET ORAL at 03:51

## 2021-10-15 RX ADMIN — CLONIDINE HYDROCHLORIDE 0.1 MG: 0.1 TABLET ORAL at 20:58

## 2021-10-15 RX ADMIN — DULOXETINE HYDROCHLORIDE 90 MG: 60 CAPSULE, DELAYED RELEASE ORAL at 12:30

## 2021-10-15 RX ADMIN — FLUCONAZOLE 150 MG: 100 TABLET ORAL at 14:07

## 2021-10-15 RX ADMIN — QUETIAPINE FUMARATE 25 MG: 25 TABLET ORAL at 22:32

## 2021-10-15 RX ADMIN — ACETAMINOPHEN 650 MG: 325 TABLET ORAL at 09:14

## 2021-10-15 RX ADMIN — AMOXICILLIN AND CLAVULANATE POTASSIUM 1 TABLET: 875; 125 TABLET, FILM COATED ORAL at 20:58

## 2021-10-15 RX ADMIN — ACETAMINOPHEN 650 MG: 325 TABLET ORAL at 15:52

## 2021-10-15 RX ADMIN — CLONIDINE HYDROCHLORIDE 0.1 MG: 0.1 TABLET ORAL at 12:30

## 2021-10-15 RX ADMIN — AMLODIPINE BESYLATE 2.5 MG: 2.5 TABLET ORAL at 12:30

## 2021-10-15 RX ADMIN — HYDROXYZINE PAMOATE 50 MG: 25 CAPSULE ORAL at 12:35

## 2021-10-15 ASSESSMENT — LIFESTYLE VARIABLES
HISTORY_ALCOHOL_USE: YES
HISTORY_ALCOHOL_USE: YES

## 2021-10-15 ASSESSMENT — SLEEP AND FATIGUE QUESTIONNAIRES
DO YOU HAVE DIFFICULTY SLEEPING: YES
DIFFICULTY FALLING ASLEEP: NO
AVERAGE NUMBER OF SLEEP HOURS: 4
RESTFUL SLEEP: NO
DIFFICULTY FALLING ASLEEP: YES
DIFFICULTY ARISING: NO
RESTFUL SLEEP: YES
AVERAGE NUMBER OF SLEEP HOURS: 4
DO YOU USE A SLEEP AID: YES
SLEEP PATTERN: DIFFICULTY FALLING ASLEEP;DISTURBED/INTERRUPTED SLEEP
DIFFICULTY STAYING ASLEEP: YES
DO YOU USE A SLEEP AID: YES
SLEEP PATTERN: DISTURBED/INTERRUPTED SLEEP;RESTLESSNESS
DIFFICULTY ARISING: NO
DIFFICULTY STAYING ASLEEP: YES
DO YOU HAVE DIFFICULTY SLEEPING: YES

## 2021-10-15 ASSESSMENT — PAIN DESCRIPTION - DESCRIPTORS
DESCRIPTORS: ACHING
DESCRIPTORS: HEADACHE

## 2021-10-15 ASSESSMENT — PAIN DESCRIPTION - ONSET: ONSET: GRADUAL

## 2021-10-15 ASSESSMENT — PAIN DESCRIPTION - LOCATION
LOCATION: TEETH
LOCATION: HEAD

## 2021-10-15 ASSESSMENT — PAIN SCALES - GENERAL
PAINLEVEL_OUTOF10: 8
PAINLEVEL_OUTOF10: 3
PAINLEVEL_OUTOF10: 7
PAINLEVEL_OUTOF10: 2
PAINLEVEL_OUTOF10: 0

## 2021-10-15 ASSESSMENT — PAIN DESCRIPTION - PAIN TYPE
TYPE: CHRONIC PAIN
TYPE: ACUTE PAIN

## 2021-10-15 ASSESSMENT — PATIENT HEALTH QUESTIONNAIRE - PHQ9
SUM OF ALL RESPONSES TO PHQ QUESTIONS 1-9: 16
SUM OF ALL RESPONSES TO PHQ QUESTIONS 1-9: 11

## 2021-10-15 ASSESSMENT — PAIN DESCRIPTION - PROGRESSION: CLINICAL_PROGRESSION: GRADUALLY WORSENING

## 2021-10-15 ASSESSMENT — PAIN DESCRIPTION - ORIENTATION: ORIENTATION: RIGHT;UPPER

## 2021-10-15 ASSESSMENT — PAIN - FUNCTIONAL ASSESSMENT: PAIN_FUNCTIONAL_ASSESSMENT: ACTIVITIES ARE NOT PREVENTED

## 2021-10-15 ASSESSMENT — PAIN DESCRIPTION - FREQUENCY: FREQUENCY: INTERMITTENT

## 2021-10-15 NOTE — PLAN OF CARE
Problem: Pain:  Goal: Pain level will decrease  Outcome: Ongoing    Patient able to use pain rating scale 0/10 adequately without problems. Pain medications explained along with frequency and when to notify the nurse with  possible side effects. Verbalizes understanding. Medicated with tylenol this morning for complaints of a \"HA\" with effective results. Problem: Suicide risk  Goal: Provide patient with safe environment  Outcome: Ongoing    1:1 Assessment completed, see flow sheet. Patient is alert and oriented x4. Patient has been visible on the unit this shift and is dressed appropriately in street clothing. Patient denied SI/HI/AVH. The patient reported her mood as\"I have been better\". Sri Souza was tearful this morning but able to verbalize her thoughts and talk through it with staff. Patient did attend group this shift and participated appropriately. Medications verified through [employer].
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
## 2021-10-15 NOTE — PROGRESS NOTES
...   [facility]  Admission Note     Admission Type:   Admission Type: Voluntary (signed in as voluntary once she arrived to the [facility])    Reason for admission:  Reason for Admission: SI+, self harm on the 13th, relapsed on alcohol Sunday.     PATIENT STRENGTHS:  Strengths: Communication, Connection to output provider, No significant Physical Illness, Employment, Social Skills    Patient Strengths and Limitations:  Limitations: Tendency to isolate self, Lacks leisure interests, Difficulty problem solving/relies on others to help solve problems    Addictive Behavior:   Addictive Behavior  In the past 3 months, have you felt or has someone told you that you have a problem with:  : None  Do you have a history of Chemical Use?: No  Do you have a history of Alcohol Use?: Yes  Do you have a history of Street Drug Abuse?: Yes  Histroy of Prescripton Drug Abuse?: No    Medical Problems:   Past Medical History:   Diagnosis Date    Alcohol abuse     Depression     Diverticulosis     DOMINICK (generalized anxiety disorder)     GERD (gastroesophageal reflux disease)     Kidney stone     Tobacco abuse        Status EXAM:  Status and Exam  Normal: No  Facial Expression: Sad  Affect: Blunt  Level of Consciousness: Alert  Mood:Normal: No  Mood: Depressed, Sad  Motor Activity:Normal: Yes  Interview Behavior: Cooperative  Preception: [place] to Person, [place] to Time, [place] to Place, [place] to Situation  Attention:Normal: Yes  Thought Processes: Circumstantial  Thought Content:Normal: Yes  Hallucinations: None (patient denied)  Delusions: No  Memory:Normal: Yes  Insight and Judgment: No  Insight and Judgment: Poor Judgment, Poor Insight  Present Suicidal Ideation: No  Present Homicidal Ideation: No    Tobacco Screening:  Practical Counseling, on admission, jacobo X, if applicable and completed (first 3 are required if patient doesn't refuse):            ( )  Recognizing danger situations (included triggers and roadblocks)                    ( )  Coping skills (new ways to manage stress, exercise, relaxation techniques, changing routine, distraction)                                                           (X )  Basic information about quitting (benefits of quitting, techniques in how to quit, available resources  ( ) Referral for counseling faxed to [facility]ise                                           ( ) Patient refused counseling  ( ) Patient has not smoked in the last 30 days    Metabolic Screening:    Lab Results   Component Value Date    LABA1C 5.5 07/18/2021       Lab Results   Component Value Date    CHOL 227 (H) 07/18/2021    CHOL 199 11/28/2020    CHOL 203 (H) 02/02/2010     Lab Results   Component Value Date    TRIG 132 07/18/2021    TRIG 95 11/28/2020    TRIG 94 02/02/2010     Lab Results   Component Value Date    HDL 78 (H) 07/18/2021    HDL 73 (H) 11/28/2020    HDL 57 02/02/2010     No components found for: LDLCAL  Lab Results   Component Value Date    LABVLDL 26 07/18/2021    LABVLDL 19 11/28/2020    LABVLDL 19 02/02/2010         Body mass index is 32.27 kg/m². BP Readings from Last 2 Encounters:   10/14/21 (!) 140/95   10/14/21 (!) 147/92           Pt admitted with followings belongings:  Dentures: None  Vision - Corrective Lenses: Glasses (for reading)  Hearing Aid: None  Jewelry: None  Body Piercings Removed: N/A  Clothing: Footwear, Shirt (pink nike swoosh slides, 1 pink sleeveless t-shirt, 1 black sugar skull t-shirt.,1 sports type bra- pink and blk.,)  Were All Patient Medications Collected?: Yes (placed in separate bag and labeled.)  Other Valuables: Cell phone, Money (Comment), Nellie Mccann (partial cig pack with lighter, wallet with cards, keys, cell phone, elastic bracelet, $3.00 dollar bills and small amount of change.)     Patient's home medications were recorded and placed in a clear plastic bag and placed in patient locker.   Patient oriented to surroundings and program expectations and copy of patient rights given. Received admission packet:  YES. Consents reviewed, signed YES. Refused No. Patient verbalize understanding:  YES.   Patient education on precautions: Tequila Jenkins RN

## 2021-10-15 NOTE — ED NOTES
Pt transferred to [facility]  Via ambulance for  Psych care     Bk Kerns RN  10/14/21 7817

## 2021-10-15 NOTE — PROGRESS NOTES
Patient arrived to the [facility] from [facility] via ambulance transport. 2 EMTs related that transfer was uneventful. She said that she was having thoughts of suicide but felt safe on the I and was able to contract for safety.

## 2021-10-15 NOTE — FLOWSHEET NOTE
Group Therapy Note    Date: 10/15/2021  Start Time: 1000  End Time:  8446  Number of Participants: 9    Type of Group: Music Group     Notes:  Pt present for early part of Music Group. Pt left after first 15 minutes in did not return.     Participation Level: Minimal    Participation Quality: Appropriate      Speech:  hesitant      Affective Functioning: Blunted      Endings: None Reported    Modes of Intervention: Support, Socialization, Activity and Media      Discipline Responsible: Chaplain Marleny Richter       10/15/21 1315   Encounter Summary   Services provided to: Patient   Continue Visiting   (10/15 Music Group)   Complexity of Encounter Moderate   Length of Encounter 15 minutes

## 2021-10-15 NOTE — H&P
Hospital Medicine History & Physical      PCP: MITCHELL Leon NP    Date of Admission: 10/14/2021    Date of Service: Pt seen/examined on 10/15/2021    Chief Complaint:  No chief complaint on file. History Of Present Illness: The patient is a 48 y.o. female with a PMH of HTN, HLD who presented to [facility] for suicidal ideation. Patient was seen and evaluated in the ED by the ED medical provider, patient was medically cleared for admission to [facility] at [facility]. This note serves as an admission medical H&P.   PCP: MITCHELL Leon NP   Tobacco Use: 1 ppd  EtOH Use: occasional use  Illicit Drug Use: UDS + cannabis, endorses occasional use    Complains of dental pain to upper right jaw with periodic drainage along gum line. No sinus pain but reports fullness. No fevers or chills. Also requested to be tested for STDs as her prior significant other hx of infidelity. Past Medical History:        Diagnosis Date    Alcohol abuse     Depression     Diverticulosis     DOMINICK (generalized anxiety disorder)     GERD (gastroesophageal reflux disease)     Kidney stone     Tobacco abuse        Past Surgical History:        Procedure Laterality Date    COLONOSCOPY  2006    HYSTERECTOMY  2008       Medications Prior to Admission:    Prior to Admission medications    Medication Sig Start Date End Date Taking?  Authorizing Provider   DULoxetine (CYMBALTA) 30 MG extended release capsule Take 3 capsules by mouth daily 7/23/21 10/15/21 Yes Alcira Coyle MD   QUEtiapine (SEROQUEL) 25 MG tablet Take 1 tablet by mouth nightly 7/22/21 10/15/21 Yes Alcira Coyle MD   amLODIPine (NORVASC) 2.5 MG tablet Take 2.5 mg by mouth daily   Yes Historical Provider, MD   cloNIDine (CATAPRES) 0.1 MG tablet Take 1 tablet by mouth 2 times daily 12/1/20  Yes Kwaku Yun MD   atorvastatin (LIPITOR) 20 MG tablet Take 1 tablet by mouth nightly  Patient taking differently: Take 20 mg by mouth nightly Patient not taking and no prescription recorded at [facility] 7/22/21 8/21/21  Swapna Somers MD       Allergies:  Bupropion, Macrobid [nitrofurantoin macrocrystal], Morphine and related, and Varenicline    Social History:  The patient currently lives alone. TOBACCO:   reports that she has been smoking cigarettes. She started smoking about 40 years ago. She has a 40.00 pack-year smoking history. She has never used smokeless tobacco.  ETOH:   reports current alcohol use of about 42.0 standard drinks of alcohol per week. Family History:   Positive as follows:        Problem Relation Age of Onset    Diabetes Mother     High Cholesterol Mother     High Blood Pressure Mother     Diabetes Father     High Cholesterol Father     Cancer Father        REVIEW OF SYSTEMS:     Constitutional: Negative for fever   HENT: Negative for sore throat   Eyes: Negative for redness   Respiratory: Negative  for dyspnea, cough   Cardiovascular: Negative for chest pain   Gastrointestinal: Negative for vomiting, diarrhea   Genitourinary: Negative for hematuria   Musculoskeletal: Negative for arthralgias   Skin: Negative for rash   Neurological: Negative for syncope   Hematological: Negative for adenopathy   Psychiatric/Behavorial: Negative for anxiety    PHYSICAL EXAM:    BP (!) 146/84   Pulse 76   Temp 97.5 °F (36.4 °C) (Temporal)   Resp 16   Ht 5' 3\" (1.6 m)   Wt 165 lb (74.8 kg)   LMP  (LMP Unknown)   SpO2 96%   Breastfeeding No   BMI 29.23 kg/m²   Gen: No distress. Alert. Middle aged  female  Eyes: PERRL. No sclera icterus. No conjunctival injection. ENT: No discharge. Pharynx clear. Mild erythema to left upper mandible along 2nd molar  Neck:  Trachea midline. Resp: No accessory muscle use. No crackles. No wheezes. No rhonchi. CV: Regular rate. Regular rhythm. No murmur. No rub. No edema. GI: Soft, Non-tender. Non-distended. Skin: Warm and dry. Superficial linear abrasions to left arm.  No rash on exposed extremities. Neuro: Awake. Grossly nonfocal, CN II-XII intact, moves all extremities, follows commands, no dysarthria    Psych: Defer to psychiatry. CBC:   Recent Labs     10/14/21  1245   WBC 10.4   HGB 15.0   HCT 43.8   .1*        BMP:   Recent Labs     10/14/21  1245   *   K 3.5   CL 97*   CO2 23   BUN 8   CREATININE 0.6     LIVER PROFILE:   Recent Labs     10/14/21  1245   AST 17   ALT 20   BILITOT 0.9   ALKPHOS 141*     UA:  Recent Labs     10/14/21  1245   COLORU YELLOW   PHUR 6.0  6.0   WBCUA 10*   RBCUA 1   BACTERIA 4+*   CLARITYU CLOUDY*   SPECGRAV 1.005   LEUKOCYTESUR TRACE*   UROBILINOGEN 0.2   BILIRUBINUR Negative   BLOODU Negative   GLUCOSEU Negative      U/A:    Lab Results   Component Value Date    COLORU YELLOW 10/14/2021    WBCUA 10 10/14/2021    RBCUA 1 10/14/2021    BACTERIA 4+ 10/14/2021    CLARITYU CLOUDY 10/14/2021    SPECGRAV 1.005 10/14/2021    LEUKOCYTESUR TRACE 10/14/2021    BLOODU Negative 10/14/2021    GLUCOSEU Negative 10/14/2021    GLUCOSEU NEGATIVE 02/05/2010    AMORPHOUS 1+ 11/29/2020     CULTURES  Genital Wet Prep: pending    C trachomatis/N.gonorrhoeae: pending    SARS-COV-2 - Rapid: Not detected     EKG: None    RADIOLOGY  None    ASSESSMENT/PLAN:    Unspecified Mood DO  Suicidal Ideation  - cont mgmt per I    Dental Infection  - start Augmentin with lactobacillus  - add PRN Diflucan as pt frequently develops vaginal candidiasis with abx - may need script sent with her at d/c     Asthma  - mild, intermittent  - PRN Albuterol    HTN  - uncontrolled  - cont Norvasc and Clonidine    HLD  - previously on statin  - pt no longer taking this, unclear why    Pt requested STI testing  - wet prep, GC/Chlamydia were ordered    Cannabis Abuse  - UDS +  - counseled cessation     Tobacco Dependence  - Recommended cessation  - nicotine patch ordered    Pt has no medical complaints at this time.  Pt was informed that they may have [facility] contact us should any medical concerns arise during this admission.     Jem Pollock PA-C 12:47 PM 10/15/2021
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
## 2021-10-15 NOTE — PROGRESS NOTES
Behavioral Services  Medicare Certification Upon Admission    I certify that this patient's inpatient psychiatric hospital admission is medically necessary for:    [x] (1) Treatment which could reasonably be expected to improve this patient's condition,       [x] (2) Or for diagnostic study;     AND     [x](2) The inpatient psychiatric services are provided while the individual is under the care of a physician and are included in the individualized plan of care.     Estimated length of stay/service 4-6 days    Plan for post-hospital care incomplete     Electronically signed by Frances Perry MD on 10/15/2021 at 11:03 AM

## 2021-10-16 LAB
BACTERIA WET PREP: NORMAL
CLUE CELLS: NORMAL
EPITHELIAL CELLS WET PREP: NORMAL
RBC WET PREP: NORMAL
SOURCE WET PREP: NORMAL
TRICHOMONAS PREP: NORMAL
WBC WET PREP: NORMAL
YEAST WET PREP: NORMAL

## 2021-10-16 PROCEDURE — 87491 CHLMYD TRACH DNA AMP PROBE: CPT

## 2021-10-16 PROCEDURE — 99233 SBSQ HOSP IP/OBS HIGH 50: CPT | Performed by: PSYCHIATRY & NEUROLOGY

## 2021-10-16 PROCEDURE — 1240000000 HC EMOTIONAL WELLNESS R&B

## 2021-10-16 PROCEDURE — 6370000000 HC RX 637 (ALT 250 FOR IP): Performed by: PSYCHIATRY & NEUROLOGY

## 2021-10-16 PROCEDURE — 87591 N.GONORRHOEAE DNA AMP PROB: CPT

## 2021-10-16 PROCEDURE — 6370000000 HC RX 637 (ALT 250 FOR IP): Performed by: PHYSICIAN ASSISTANT

## 2021-10-16 PROCEDURE — 87210 SMEAR WET MOUNT SALINE/INK: CPT

## 2021-10-16 RX ORDER — GUAIFENESIN 600 MG/1
600 TABLET, EXTENDED RELEASE ORAL 2 TIMES DAILY
Status: DISCONTINUED | OUTPATIENT
Start: 2021-10-16 | End: 2021-10-18 | Stop reason: HOSPADM

## 2021-10-16 RX ADMIN — GUAIFENESIN 600 MG: 600 TABLET, EXTENDED RELEASE ORAL at 13:14

## 2021-10-16 RX ADMIN — QUETIAPINE FUMARATE 25 MG: 25 TABLET ORAL at 22:10

## 2021-10-16 RX ADMIN — GUAIFENESIN 600 MG: 600 TABLET, EXTENDED RELEASE ORAL at 22:09

## 2021-10-16 RX ADMIN — CLONIDINE HYDROCHLORIDE 0.1 MG: 0.1 TABLET ORAL at 08:37

## 2021-10-16 RX ADMIN — HYDROXYZINE PAMOATE 50 MG: 25 CAPSULE ORAL at 13:14

## 2021-10-16 RX ADMIN — AMOXICILLIN AND CLAVULANATE POTASSIUM 1 TABLET: 875; 125 TABLET, FILM COATED ORAL at 22:09

## 2021-10-16 RX ADMIN — AMOXICILLIN AND CLAVULANATE POTASSIUM 1 TABLET: 875; 125 TABLET, FILM COATED ORAL at 08:37

## 2021-10-16 RX ADMIN — Medication 1 CAPSULE: at 08:37

## 2021-10-16 RX ADMIN — DULOXETINE HYDROCHLORIDE 90 MG: 60 CAPSULE, DELAYED RELEASE ORAL at 08:37

## 2021-10-16 RX ADMIN — AMLODIPINE BESYLATE 2.5 MG: 2.5 TABLET ORAL at 08:37

## 2021-10-16 RX ADMIN — ACETAMINOPHEN 650 MG: 325 TABLET ORAL at 03:35

## 2021-10-16 RX ADMIN — CLONIDINE HYDROCHLORIDE 0.1 MG: 0.1 TABLET ORAL at 22:09

## 2021-10-16 ASSESSMENT — PAIN DESCRIPTION - PROGRESSION: CLINICAL_PROGRESSION: GRADUALLY WORSENING

## 2021-10-16 ASSESSMENT — PAIN DESCRIPTION - ONSET: ONSET: GRADUAL

## 2021-10-16 ASSESSMENT — PAIN DESCRIPTION - FREQUENCY: FREQUENCY: INTERMITTENT

## 2021-10-16 ASSESSMENT — PAIN SCALES - GENERAL
PAINLEVEL_OUTOF10: 7
PAINLEVEL_OUTOF10: 0

## 2021-10-16 ASSESSMENT — PAIN DESCRIPTION - ORIENTATION: ORIENTATION: RIGHT;UPPER

## 2021-10-16 ASSESSMENT — PAIN DESCRIPTION - DESCRIPTORS: DESCRIPTORS: ACHING

## 2021-10-16 ASSESSMENT — PAIN - FUNCTIONAL ASSESSMENT: PAIN_FUNCTIONAL_ASSESSMENT: ACTIVITIES ARE NOT PREVENTED

## 2021-10-16 ASSESSMENT — PAIN DESCRIPTION - LOCATION: LOCATION: TEETH

## 2021-10-16 ASSESSMENT — PAIN DESCRIPTION - PAIN TYPE: TYPE: ACUTE PAIN

## 2021-10-16 NOTE — BH NOTE
Time: 1600      Type of Group: Health and Wellness    Level of Participation: Interactive      Comments: Group topic was on progressive muscle relaxation. Pt participated appropriately and provided feedback.      Dilip Franklin, [id number]  10/16/2021
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
## 2021-10-16 NOTE — PLAN OF CARE
Problem: Substance Abuse:  Goal: Participates in care planning  Description: Participates in care planning  10/15/2021 2153 by Jem Turk RN  Outcome: Ongoing   Austin Chung has been out in the day room with peers and attended group this shift. Patient denies current SI/HI, denies A/V/H. Patient reports the anniversary of her parent's death is approaching on Halloween. Patient reports \" I need to be here. \" Medication compliant.

## 2021-10-17 PROCEDURE — 6370000000 HC RX 637 (ALT 250 FOR IP): Performed by: PSYCHIATRY & NEUROLOGY

## 2021-10-17 PROCEDURE — 6370000000 HC RX 637 (ALT 250 FOR IP): Performed by: PHYSICIAN ASSISTANT

## 2021-10-17 PROCEDURE — 1240000000 HC EMOTIONAL WELLNESS R&B

## 2021-10-17 RX ADMIN — DULOXETINE HYDROCHLORIDE 90 MG: 60 CAPSULE, DELAYED RELEASE ORAL at 07:54

## 2021-10-17 RX ADMIN — QUETIAPINE FUMARATE 25 MG: 25 TABLET ORAL at 21:53

## 2021-10-17 RX ADMIN — GUAIFENESIN 600 MG: 600 TABLET, EXTENDED RELEASE ORAL at 07:56

## 2021-10-17 RX ADMIN — HYDROXYZINE PAMOATE 50 MG: 25 CAPSULE ORAL at 08:03

## 2021-10-17 RX ADMIN — CLONIDINE HYDROCHLORIDE 0.1 MG: 0.1 TABLET ORAL at 07:53

## 2021-10-17 RX ADMIN — GUAIFENESIN 600 MG: 600 TABLET, EXTENDED RELEASE ORAL at 21:53

## 2021-10-17 RX ADMIN — Medication 1 CAPSULE: at 07:53

## 2021-10-17 RX ADMIN — AMOXICILLIN AND CLAVULANATE POTASSIUM 1 TABLET: 875; 125 TABLET, FILM COATED ORAL at 21:53

## 2021-10-17 RX ADMIN — AMLODIPINE BESYLATE 2.5 MG: 2.5 TABLET ORAL at 07:54

## 2021-10-17 RX ADMIN — AMOXICILLIN AND CLAVULANATE POTASSIUM 1 TABLET: 875; 125 TABLET, FILM COATED ORAL at 07:54

## 2021-10-17 RX ADMIN — CLONIDINE HYDROCHLORIDE 0.1 MG: 0.1 TABLET ORAL at 21:53

## 2021-10-17 NOTE — CARE COORDINATION
[facility][1]  Treatment Team Note  Review Date & Time: 10/17/21 0930     Patient was not in treatment team        Status EXAM:   Status and Exam  Normal: No  Facial Expression: Flat  Affect: Constricted  Level of Consciousness: Alert  Mood:Normal: No  Mood: Depressed, Anxious, Sad  Motor Activity:Normal: Yes  Motor Activity: Decreased  Interview Behavior: Cooperative  Preception: [place] to Person, [place] to Time, [place] to Place, [place] to Situation  Attention:Normal: Yes  Attention:  (wnl)  Thought Processes:  (Linear)  Thought Content:Normal: No  Thought Content: Preoccupations  Hallucinations: None  Delusions: No  Memory:Normal: Yes  Memory: Poor Recent  Insight and Judgment: No  Insight and Judgment: Poor Judgment  Present Suicidal Ideation: No  Present Homicidal Ideation: No        Suicide Risk CSSR-S:  1) Within the past month, have you wished you were dead or wished you could go to sleep and not wake up? : Yes  2) Have you actually had any thoughts of killing yourself? : Yes  3) Have you been thinking about how you might kill yourself? : Yes  5) Have you started to work out or worked out the details of how to kill yourself?  Do you intend to carry out this plan? : No  6) Have you ever done anything, started to do anything, or prepared to do anything to end your life?: Yes        PLAN/TREATMENT RECOMMENDATIONS UPDATE: Patient will take medication as prescribed, eat 75% of meals, attend groups, participate in milieu activities, participate in treatment team and care planning for discharge and follow up.     Linda Bae RN

## 2021-10-17 NOTE — CARE COORDINATION
[facility]  Treatment Team Note  Review Date & Time: 10/165/21 0920     Patient was not in treatment team        Status EXAM:   Status and Exam  Normal: No  Facial Expression: Flat  Affect: Constricted  Level of Consciousness: Alert  Mood:Normal: No  Mood: Depressed, Anxious, Sad  Motor Activity:Normal: Yes  Motor Activity: Decreased  Interview Behavior: Cooperative  Preception: [place] to Person, Daralyn Heckle to Time, [place] to Place, [place] to Situation  Attention:Normal: Yes  Attention:  (wnl)  Thought Processes:  (Linear)  Thought Content:Normal: No  Thought Content: Preoccupations  Hallucinations: None  Delusions: No  Memory:Normal: Yes  Memory: Poor Recent  Insight and Judgment: No  Insight and Judgment: Poor Judgment  Present Suicidal Ideation: No  Present Homicidal Ideation: No        Suicide Risk CSSR-S:  1) Within the past month, have you wished you were dead or wished you could go to sleep and not wake up? : Yes  2) Have you actually had any thoughts of killing yourself? : Yes  3) Have you been thinking about how you might kill yourself? : Yes  5) Have you started to work out or worked out the details of how to kill yourself?  Do you intend to carry out this plan? : No  6) Have you ever done anything, started to do anything, or prepared to do anything to end your life?: Yes        PLAN/TREATMENT RECOMMENDATIONS UPDATE: Patient will take medication as prescribed, eat 75% of meals, attend groups, participate in milieu activities, participate in treatment team and care planning for discharge and follow up.     Faith Stafford RN

## 2021-10-17 NOTE — PLAN OF CARE
Pt is alert and oriented X4. She is pleasant and cooperative with staff and her peers. She appears to \"mother\" some of the younger patients on the unit. She inquires about their status and offers to listen if they want to talk. She denies SI/HI/AVH.

## 2021-10-17 NOTE — PLAN OF CARE
Problem: Substance Abuse:  Goal: Absence of drug withdrawal signs and symptoms  Description: Absence of drug withdrawal signs and symptoms  10/16/2021 2349 by Marycruz Barton RN  Outcome: Ongoing   Alesia Small has been out in the day room and social with select peers this shift. Patient medication compliant. Denies current SI/HI, denies A/V/H. Bright affect and pleasant on approach.

## 2021-10-18 VITALS
DIASTOLIC BLOOD PRESSURE: 96 MMHG | WEIGHT: 165 LBS | BODY MASS INDEX: 29.23 KG/M2 | OXYGEN SATURATION: 98 % | HEIGHT: 63 IN | RESPIRATION RATE: 16 BRPM | TEMPERATURE: 97.7 F | SYSTOLIC BLOOD PRESSURE: 136 MMHG | HEART RATE: 68 BPM

## 2021-10-18 LAB
C TRACH DNA GENITAL QL NAA+PROBE: NEGATIVE
N. GONORRHOEAE DNA: NEGATIVE

## 2021-10-18 PROCEDURE — 6370000000 HC RX 637 (ALT 250 FOR IP): Performed by: PHYSICIAN ASSISTANT

## 2021-10-18 PROCEDURE — 6370000000 HC RX 637 (ALT 250 FOR IP): Performed by: PSYCHIATRY & NEUROLOGY

## 2021-10-18 PROCEDURE — 99239 HOSP IP/OBS DSCHRG MGMT >30: CPT | Performed by: PSYCHIATRY & NEUROLOGY

## 2021-10-18 PROCEDURE — 5130000000 HC BRIDGE APPOINTMENT

## 2021-10-18 RX ORDER — AMOXICILLIN AND CLAVULANATE POTASSIUM 875; 125 MG/1; MG/1
1 TABLET, FILM COATED ORAL EVERY 12 HOURS SCHEDULED
Qty: 5 TABLET | Refills: 0 | Status: SHIPPED | OUTPATIENT
Start: 2021-10-18 | End: 2021-10-21

## 2021-10-18 RX ORDER — DULOXETIN HYDROCHLORIDE 30 MG/1
90 CAPSULE, DELAYED RELEASE ORAL DAILY
Qty: 90 CAPSULE | Refills: 0 | Status: ON HOLD | OUTPATIENT
Start: 2021-10-18 | End: 2021-12-20 | Stop reason: HOSPADM

## 2021-10-18 RX ORDER — QUETIAPINE FUMARATE 25 MG/1
25 TABLET, FILM COATED ORAL NIGHTLY
Qty: 30 TABLET | Refills: 0 | Status: ON HOLD | OUTPATIENT
Start: 2021-10-18 | End: 2021-12-20 | Stop reason: HOSPADM

## 2021-10-18 RX ORDER — TRAZODONE HYDROCHLORIDE 50 MG/1
50 TABLET ORAL NIGHTLY PRN
Qty: 20 TABLET | Refills: 0 | Status: SHIPPED | OUTPATIENT
Start: 2021-10-18 | End: 2021-12-10

## 2021-10-18 RX ADMIN — CLONIDINE HYDROCHLORIDE 0.1 MG: 0.1 TABLET ORAL at 09:15

## 2021-10-18 RX ADMIN — Medication 1 CAPSULE: at 09:13

## 2021-10-18 RX ADMIN — DULOXETINE HYDROCHLORIDE 90 MG: 60 CAPSULE, DELAYED RELEASE ORAL at 09:14

## 2021-10-18 RX ADMIN — AMLODIPINE BESYLATE 2.5 MG: 2.5 TABLET ORAL at 09:17

## 2021-10-18 RX ADMIN — AMOXICILLIN AND CLAVULANATE POTASSIUM 1 TABLET: 875; 125 TABLET, FILM COATED ORAL at 09:17

## 2021-10-18 RX ADMIN — GUAIFENESIN 600 MG: 600 TABLET, EXTENDED RELEASE ORAL at 09:16

## 2021-10-18 NOTE — SUICIDE SAFETY PLAN
SAFETY PLAN    A suicide Safety Plan is a document that supports someone when they are having thoughts of suicide. Warning Signs that indicate a suicidal crisis may be developing: What (situations, thoughts, feelings, body sensations, behaviors, etc.) do you experience that lets you know you are beginning to think about suicide? 1. Thoughts  2. Mood  3. Behavior    Internal Coping Strategies:  What things can I do (relaxation techniques, hobbies, physical activities, etc.) to take my mind off my problems without contacting another person? 1. Walk  2. Call [facility] Hotline  3. Ask for Help    People and social settings that provide distraction: Who can I call or where can I go to distract me? 1. Place: Work       2. Place: Park  Making the environment safe: How can I make my environment (house/apartment/living space) safer? For example, can I remove guns, medications, and other items? 1. Leave and get safe  2. Call  or [facility] whom I can ask for help: Who can I call when I need help - for example, friends, family, clergy, someone else? 1. Name:               2. Name: Robert Olmedo    3. Name: Mishel Meredith or [address] [place] [address] I can contact during a crisis: Who can I call for help - for example, my doctor, my psychiatrist, my psychologist, a mental health provider, a suicide hotline? 1. Suicide Prevention Lifeline: 5-073-291-TALK (5230)    2.  [address] [place] [address] Emergency Services -  for example, [facility] suicide hotline, [facility] Hotline: 80           Emergency Services Phone: [phone]

## 2021-10-18 NOTE — PLAN OF CARE
Problem: Substance Abuse:  Goal: Absence of drug withdrawal signs and symptoms  Description: Absence of drug withdrawal signs and symptoms  10/17/2021 2348 by Claudene Sample, RN  Outcome: Ongoing     Problem: Substance Abuse:  Goal: Participates in care planning  Description: Participates in care planning  10/17/2021 2348 by Claudene Sample, RN  Outcome: Ongoing   Kevin Blue has been out in the day room and social with peers this shift. Patient denies current SI/HI, denies A/V/H. Reports her mood is \"tired\" . Medication compliant.

## 2021-10-18 NOTE — BH NOTE
[facility]  Discharge Note    Pt discharged with followings belongings:   Dentures: None  Vision - Corrective Lenses: Glasses (for reading)  Hearing Aid: None  Jewelry: None  Body Piercings Removed: N/A  Clothing: Footwear, Shirt (pink nike swoosh slides, 1 pink sleeveless t-shirt, 1 black sugar skull t-shirt.,1 sports type bra- pink and blk.,)  Were All Patient Medications Collected?: Yes (placed in separate bag and labeled.)  Other Valuables: Cell phone, Money (Comment), Purse, Wallet, Keys (partial cig pack with lighter, wallet with cards, keys, cell phone, elastic bracelet, $3.00 dollar bills and small amount of change.)   Valuables sent home with patient  or returned to patient. Patient education on aftercare instructions: yes  . Patient verbalize understanding of AVS:  yes.     Status EXAM upon discharge:  Status and Exam  Normal: No  Facial Expression: Worried  Affect: Congruent  Level of Consciousness: Alert  Mood:Normal: No  Mood: Anxious, Depressed (improving)  Motor Activity:Normal: Yes  Interview Behavior: Cooperative  Preception: [place] to Person, Katheleen Dessert to Time, [place] to Situation, [place] to Place  Attention:Normal: Yes  Thought Processes: Other(See comment) (Linear)  Thought Content:Normal: Yes  Hallucinations: None  Delusions: No  Memory:Normal: Yes  Insight and Judgment: No  Insight and Judgment: Poor Judgment (improving)  Present Suicidal Ideation: No  Present Homicidal Ideation: No      Metabolic Screening:    Lab Results   Component Value Date    LABA1C 5.5 07/18/2021       Lab Results   Component Value Date    CHOL 227 (H) 07/18/2021    CHOL 199 11/28/2020    CHOL 203 (H) 02/02/2010     Lab Results   Component Value Date    TRIG 132 07/18/2021    TRIG 95 11/28/2020    TRIG 94 02/02/2010     Lab Results   Component Value Date    HDL 78 (H) 07/18/2021    HDL 73 (H) 11/28/2020    HDL 57 02/02/2010     No components found for: LDLCAL  Lab Results   Component Value Date    LABVLDL 26 07/18/2021    LABVLDL 19 11/28/2020    LABVLDL 19 02/02/2010       Elvis Laguna RN

## 2021-10-18 NOTE — BH NOTE
Case management note:    Writer met with this patient to discuss disposition. Writer recommended women helping women and this patient is familiar with the resource. Patient has an appointment with her  at [facility] on Thursday. Patient was bright, future oriented, and looking forward to going home, where she reports she will be safe.     Johnson Nassar 50
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
## 2021-11-27 PROBLEM — F33.2 MAJOR DEPRESSIVE DISORDER, RECURRENT SEVERE WITHOUT PSYCHOTIC FEATURES (HCC): Status: ACTIVE | Noted: 2021-11-27

## 2021-11-27 PROBLEM — F33.9 MAJOR DEPRESSION, RECURRENT (HCC): Status: ACTIVE | Noted: 2021-11-27

## 2021-11-28 ENCOUNTER — HOSPITAL ENCOUNTER (OUTPATIENT)
Age: 56
Setting detail: OBSERVATION
Discharge: HOME | End: 2021-11-28
Attending: PSYCHIATRY & NEUROLOGY | Admitting: PSYCHIATRY & NEUROLOGY

## 2021-11-28 VITALS
SYSTOLIC BLOOD PRESSURE: 181 MMHG | HEIGHT: 65 IN | HEART RATE: 73 BPM | BODY MASS INDEX: 24.99 KG/M2 | DIASTOLIC BLOOD PRESSURE: 102 MMHG | WEIGHT: 150 LBS | RESPIRATION RATE: 16 BRPM | OXYGEN SATURATION: 99 % | TEMPERATURE: 97 F

## 2021-11-28 PROBLEM — F60.3 BORDERLINE PERSONALITY DISORDER (HCC): Status: ACTIVE | Noted: 2021-11-28

## 2021-11-28 LAB — TSH SERPL DL<=0.05 MIU/L-ACNC: 1.18 UIU/ML (ref 0.27–4.2)

## 2021-11-28 PROCEDURE — 36415 COLL VENOUS BLD VENIPUNCTURE: CPT

## 2021-11-28 PROCEDURE — G0379 DIRECT REFER HOSPITAL OBSERV: HCPCS

## 2021-11-28 PROCEDURE — 84443 ASSAY THYROID STIM HORMONE: CPT

## 2021-11-28 PROCEDURE — 5130000000 HC BRIDGE APPOINTMENT

## 2021-11-28 PROCEDURE — 99223 1ST HOSP IP/OBS HIGH 75: CPT | Performed by: PSYCHIATRY & NEUROLOGY

## 2021-11-28 PROCEDURE — G0378 HOSPITAL OBSERVATION PER HR: HCPCS

## 2021-11-28 RX ORDER — TRAZODONE HYDROCHLORIDE 50 MG/1
50 TABLET ORAL NIGHTLY
Status: DISCONTINUED | OUTPATIENT
Start: 2021-11-28 | End: 2021-11-28 | Stop reason: HOSPADM

## 2021-11-28 RX ORDER — IBUPROFEN 400 MG/1
400 TABLET ORAL EVERY 6 HOURS PRN
Status: DISCONTINUED | OUTPATIENT
Start: 2021-11-28 | End: 2021-11-28 | Stop reason: HOSPADM

## 2021-11-28 RX ORDER — OLANZAPINE 10 MG/1
10 INJECTION, POWDER, LYOPHILIZED, FOR SOLUTION INTRAMUSCULAR EVERY 8 HOURS PRN
Status: DISCONTINUED | OUTPATIENT
Start: 2021-11-28 | End: 2021-11-28 | Stop reason: HOSPADM

## 2021-11-28 RX ORDER — OLANZAPINE 10 MG/1
10 TABLET ORAL EVERY 8 HOURS PRN
Status: DISCONTINUED | OUTPATIENT
Start: 2021-11-28 | End: 2021-11-28 | Stop reason: HOSPADM

## 2021-11-28 RX ORDER — BENZTROPINE MESYLATE 1 MG/ML
2 INJECTION INTRAMUSCULAR; INTRAVENOUS 2 TIMES DAILY PRN
Status: DISCONTINUED | OUTPATIENT
Start: 2021-11-28 | End: 2021-11-28 | Stop reason: HOSPADM

## 2021-11-28 RX ORDER — MAGNESIUM HYDROXIDE/ALUMINUM HYDROXICE/SIMETHICONE 120; 1200; 1200 MG/30ML; MG/30ML; MG/30ML
30 SUSPENSION ORAL EVERY 6 HOURS PRN
Status: DISCONTINUED | OUTPATIENT
Start: 2021-11-28 | End: 2021-11-28 | Stop reason: HOSPADM

## 2021-11-28 RX ORDER — HYDROXYZINE PAMOATE 50 MG/1
50 CAPSULE ORAL EVERY 6 HOURS PRN
Status: DISCONTINUED | OUTPATIENT
Start: 2021-11-28 | End: 2021-11-28 | Stop reason: HOSPADM

## 2021-11-28 RX ORDER — ACETAMINOPHEN 325 MG/1
650 TABLET ORAL EVERY 4 HOURS PRN
Status: DISCONTINUED | OUTPATIENT
Start: 2021-11-28 | End: 2021-11-28 | Stop reason: HOSPADM

## 2021-11-28 SDOH — ECONOMIC STABILITY: INCOME INSECURITY: HOW HARD IS IT FOR YOU TO PAY FOR THE VERY BASICS LIKE FOOD, HOUSING, MEDICAL CARE, AND HEATING?: VERY HARD

## 2021-11-28 SDOH — SOCIAL STABILITY: SOCIAL INSECURITY
WITHIN THE LAST YEAR, HAVE TO BEEN RAPED OR FORCED TO HAVE ANY KIND OF SEXUAL ACTIVITY BY YOUR PARTNER OR EX-PARTNER?: YES

## 2021-11-28 SDOH — ECONOMIC STABILITY: HOUSING INSECURITY: IN THE LAST 12 MONTHS, HOW MANY PLACES HAVE YOU LIVED?: 1

## 2021-11-28 SDOH — SOCIAL STABILITY: SOCIAL NETWORK: ARE YOU MARRIED, WIDOWED, DIVORCED, SEPARATED, NEVER MARRIED, OR LIVING WITH A PARTNER?: DIVORCED

## 2021-11-28 SDOH — ECONOMIC STABILITY: FOOD INSECURITY: WITHIN THE PAST 12 MONTHS, THE FOOD YOU BOUGHT JUST DIDN'T LAST AND YOU DIDN'T HAVE MONEY TO GET MORE.: NEVER TRUE

## 2021-11-28 SDOH — ECONOMIC STABILITY: TRANSPORTATION INSECURITY
IN THE PAST 12 MONTHS, HAS THE LACK OF TRANSPORTATION KEPT YOU FROM MEDICAL APPOINTMENTS OR FROM GETTING MEDICATIONS?: NO

## 2021-11-28 SDOH — HEALTH STABILITY: PHYSICAL HEALTH: ON AVERAGE, HOW MANY MINUTES DO YOU ENGAGE IN EXERCISE AT THIS LEVEL?: 30 MIN

## 2021-11-28 SDOH — HEALTH STABILITY: PHYSICAL HEALTH: ON AVERAGE, HOW MANY DAYS PER WEEK DO YOU ENGAGE IN MODERATE TO STRENUOUS EXERCISE (LIKE A BRISK WALK)?: 3 DAYS

## 2021-11-28 SDOH — SOCIAL STABILITY: SOCIAL NETWORK: HOW OFTEN DO YOU ATTEND CHURCH OR RELIGIOUS SERVICES?: NEVER

## 2021-11-28 SDOH — SOCIAL STABILITY: SOCIAL INSECURITY: WITHIN THE LAST YEAR, HAVE YOU BEEN HUMILIATED OR EMOTIONALLY ABUSED IN OTHER WAYS BY YOUR PARTNER OR EX-PARTNER?: YES

## 2021-11-28 SDOH — ECONOMIC STABILITY: HOUSING INSECURITY
IN THE LAST 12 MONTHS, WAS THERE A TIME WHEN YOU DID NOT HAVE A STEADY PLACE TO SLEEP OR SLEPT IN A SHELTER (INCLUDING NOW)?: NO

## 2021-11-28 SDOH — SOCIAL STABILITY: SOCIAL NETWORK: HOW OFTEN DO YOU GET TOGETHER WITH FRIENDS OR RELATIVES?: TWICE A WEEK

## 2021-11-28 SDOH — SOCIAL STABILITY: SOCIAL NETWORK
DO YOU BELONG TO ANY CLUBS OR ORGANIZATIONS SUCH AS CHURCH GROUPS UNIONS, FRATERNAL OR ATHLETIC GROUPS, OR SCHOOL GROUPS?: NO

## 2021-11-28 SDOH — ECONOMIC STABILITY: INCOME INSECURITY: IN THE LAST 12 MONTHS, WAS THERE A TIME WHEN YOU WERE NOT ABLE TO PAY THE MORTGAGE OR RENT ON TIME?: YES

## 2021-11-28 SDOH — HEALTH STABILITY: MENTAL HEALTH
STRESS IS WHEN SOMEONE FEELS TENSE, NERVOUS, ANXIOUS, OR CAN'T SLEEP AT NIGHT BECAUSE THEIR MIND IS TROUBLED. HOW STRESSED ARE YOU?: VERY MUCH

## 2021-11-28 SDOH — SOCIAL STABILITY: SOCIAL INSECURITY
WITHIN THE LAST YEAR, HAVE YOU BEEN KICKED, HIT, SLAPPED, OR OTHERWISE PHYSICALLY HURT BY YOUR PARTNER OR EX-PARTNER?: YES

## 2021-11-28 SDOH — HEALTH STABILITY: MENTAL HEALTH: HOW MANY STANDARD DRINKS CONTAINING ALCOHOL DO YOU HAVE ON A TYPICAL DAY?: 3 OR 4

## 2021-11-28 SDOH — HEALTH STABILITY: MENTAL HEALTH: HOW OFTEN DO YOU HAVE A DRINK CONTAINING ALCOHOL?: 2-3 TIMES A WEEK

## 2021-11-28 SDOH — SOCIAL STABILITY: SOCIAL INSECURITY: WITHIN THE LAST YEAR, HAVE YOU BEEN AFRAID OF YOUR PARTNER OR EX-PARTNER?: YES

## 2021-11-28 SDOH — ECONOMIC STABILITY: TRANSPORTATION INSECURITY
IN THE PAST 12 MONTHS, HAS LACK OF TRANSPORTATION KEPT YOU FROM MEETINGS, WORK, OR FROM GETTING THINGS NEEDED FOR DAILY LIVING?: NO

## 2021-11-28 SDOH — SOCIAL STABILITY: SOCIAL NETWORK: HOW OFTEN DO YOU ATTENT MEETINGS OF THE CLUB OR ORGANIZATION YOU BELONG TO?: NEVER

## 2021-11-28 SDOH — SOCIAL STABILITY: SOCIAL NETWORK
IN A TYPICAL WEEK, HOW MANY TIMES DO YOU TALK ON THE PHONE WITH FAMILY, FRIENDS, OR NEIGHBORS?: MORE THAN THREE TIMES A WEEK

## 2021-11-28 ASSESSMENT — SLEEP AND FATIGUE QUESTIONNAIRES
RESTFUL SLEEP: NO
DIFFICULTY STAYING ASLEEP: YES
DIFFICULTY ARISING: NO
DO YOU HAVE DIFFICULTY SLEEPING: YES
DO YOU USE A SLEEP AID: YES
DIFFICULTY FALLING ASLEEP: YES
AVERAGE NUMBER OF SLEEP HOURS: 4
SLEEP PATTERN: DIFFICULTY FALLING ASLEEP;RESTLESSNESS;DISTURBED/INTERRUPTED SLEEP;EARLY AWAKENING

## 2021-11-28 ASSESSMENT — PATIENT HEALTH QUESTIONNAIRE - PHQ9: SUM OF ALL RESPONSES TO PHQ QUESTIONS 1-9: 22

## 2021-11-28 ASSESSMENT — LIFESTYLE VARIABLES: HISTORY_ALCOHOL_USE: YES

## 2021-11-28 NOTE — BH NOTE
Bridge Appointment completed: Reviewed Discharge Instructions with patient. Patient verbalizes understanding and agreement with the discharge plan using the teachback method.      Referral for Outpatient Tobacco Cessation Counseling, upon discharge (jacobo X if applicable and completed):    ( )  Hospital staff assisted patient to call Quit Line or faxed referral                                   during hospitalization                  ( )  Recognizing danger situations (included triggers and roadblocks), if not completed on admission                    ( )  Coping skills (new ways to manage stress, exercise, relaxation techniques, changing routine, distraction), if not completed on admission                                                           ( )  Basic information about quitting (benefits of quitting, techniques in how to quit, available resources, if not completed on admission  ( ) Referral for counseling faxed to [facility]   ( ) Patient refused referral  ( ) Patient refused counseling  ( X) Patient refused smoking cessation medication upon discharge    Vaccinations (jacobo X if applicable and completed):  (X) Patient states already received influenza vaccine elsewhere  ( ) Patient received influenza vaccine during this hospitalization  ( ) Patient refused influenza vaccine at this time

## 2021-11-28 NOTE — PROGRESS NOTES
[facility]  Admission Note     Admission Type:   Admission Type: Voluntary    Reason for admission:  Reason for Admission: Suicidal Ideation & depression    PATIENT STRENGTHS:  Strengths: Connection to output provider, Communication, No significant Physical Illness    Patient Strengths and Limitations:  Limitations: Tendency to isolate self, External locus of control, Difficult relationships / poor social skills, General negative or hopeless attitude about future/recovery    Addictive Behavior:   Addictive Behavior  In the past 3 months, have you felt or has someone told you that you have a problem with:  : None  Do you have a history of Chemical Use?: No  Do you have a history of Alcohol Use?: Yes  Do you have a history of Street Drug Abuse?: No  Histroy of Prescripton Drug Abuse?: No    Medical Problems:   Past Medical History:   Diagnosis Date    Alcohol abuse     Depression     Diverticulosis     DOMINICK (generalized anxiety disorder)     GERD (gastroesophageal reflux disease)     Kidney stone     Tobacco abuse        Status EXAM:  Status and Exam  Normal: No  Facial Expression: Flat  Affect: Blunt  Level of Consciousness: Alert  Mood:Normal: No  Mood: Depressed, Worthless, low self-esteem, Helpless, Sad  Motor Activity:Normal: Yes  Interview Behavior: Cooperative  Preception: [place] to Person, Marguerita Civil to Time, [place] to Place, [place] to Situation  Attention:Normal: Yes  Thought Processes:  (Linear)  Thought Content:Normal: Yes  Hallucinations: None  Delusions: No  Memory:Normal: Yes  Insight and Judgment: No  Insight and Judgment: Poor Judgment, Poor Insight  Present Suicidal Ideation: No (\"I did on Saturday. \")  Present Homicidal Ideation: No    Tobacco Screening:  Practical Counseling, on admission, jacobo X, if applicable and completed (first 3 are required if patient doesn't refuse):             ( X)  Recognizing danger situations (included triggers and roadblocks)                    (X ) Coping skills (new ways to manage stress, exercise, relaxation techniques, changing routine, distraction)                                                           (X )  Basic information about quitting (benefits of quitting, techniques in how to quit, available resources  ( ) Referral for counseling faxed to [facility]                                           ( ) Patient refused counseling  ( ) Patient has not smoked in the last 30 days    Metabolic Screening:    Lab Results   Component Value Date    LABA1C 5.5 07/18/2021       Lab Results   Component Value Date    CHOL 227 (H) 07/18/2021    CHOL 199 11/28/2020    CHOL 203 (H) 02/02/2010     Lab Results   Component Value Date    TRIG 132 07/18/2021    TRIG 95 11/28/2020    TRIG 94 02/02/2010     Lab Results   Component Value Date    HDL 78 (H) 07/18/2021    HDL 73 (H) 11/28/2020    HDL 57 02/02/2010     No components found for: LDLCAL  Lab Results   Component Value Date    LABVLDL 26 07/18/2021    LABVLDL 19 11/28/2020    LABVLDL 19 02/02/2010         Body mass index is 24.96 kg/m². BP Readings from Last 2 Encounters:   11/28/21 (!) 142/90   10/18/21 (!) 136/96           Pt admitted with followings belongings:  Dentures: None  Vision - Corrective Lenses: None  Hearing Aid: None  Jewelry: Ring, Bracelet  Clothing: Shirt, Pants, Undergarments (Comment), Socks, Footwear  Other Valuables: Bobo Lopez, Cell phone ()     Patient's home medications were none. Patient oriented to surroundings and program expectations and copy of patient rights given. Received admission packet:  yes. Consents reviewed, signed yes. . Patient verbalize understanding: yes.   Patient education on precautions: yes                   Oswaldo Burleson RN
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
## 2021-11-28 NOTE — PROGRESS NOTES
Behavioral Services  Medicare Certification Upon Admission    I certify that this patient's inpatient psychiatric hospital admission is medically necessary for:    [x] (1) Treatment which could reasonably be expected to improve this patient's condition,       [x] (2) Or for diagnostic study;     AND     [x](2) The inpatient psychiatric services are provided while the individual is under the care of a physician and are included in the individualized plan of care.     Estimated length of stay/service 2-3 d    Plan for post-hospital care outpt    Electronically signed by Cruz Dubois MD on 11/28/2021 at 7:55 AM

## 2021-11-28 NOTE — PROGRESS NOTES
Patient arrived on the unit at 03:40, from [facility], via stretcher, accompanied by 2 transport staff. Patient was pleasant & cooperative & greeted by Waldemar Dubon.  Liz Dominguez R.N.
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
## 2021-12-08 ENCOUNTER — HOSPITAL ENCOUNTER (EMERGENCY)
Age: 56
Discharge: HOME | End: 2021-12-08

## 2021-12-08 VITALS
SYSTOLIC BLOOD PRESSURE: 149 MMHG | TEMPERATURE: 98.2 F | OXYGEN SATURATION: 98 % | HEART RATE: 77 BPM | RESPIRATION RATE: 22 BRPM | DIASTOLIC BLOOD PRESSURE: 98 MMHG

## 2021-12-08 DIAGNOSIS — T74.21XA SEXUAL ASSAULT OF ADULT, INITIAL ENCOUNTER: Primary | ICD-10-CM

## 2021-12-08 PROCEDURE — 99284 EMERGENCY DEPT VISIT MOD MDM: CPT

## 2021-12-08 ASSESSMENT — ENCOUNTER SYMPTOMS
COUGH: 0
VOMITING: 0
RHINORRHEA: 0
NAUSEA: 0
ABDOMINAL PAIN: 0
SHORTNESS OF BREATH: 0
DIARRHEA: 0
WHEEZING: 0

## 2021-12-08 ASSESSMENT — PAIN DESCRIPTION - LOCATION: LOCATION: ABDOMEN

## 2021-12-08 ASSESSMENT — PAIN SCALES - GENERAL: PAINLEVEL_OUTOF10: 9
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
## 2021-12-08 NOTE — ED NOTES
Patient is pacing the hallway stating that she would like to leave. Provider aware.      [facility], RN  12/08/21 2162

## 2021-12-08 NOTE — ED NOTES
VIK RN at bedside. Patient seems to want to refuse exam, VIK is going to speak with patient.      Jessie Antunez RN  12/08/21 5505

## 2021-12-08 NOTE — ED NOTES
[address] walking patient out. Hay called for patient transportation.      Radhika Bonner RN  12/08/21 0714
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
## 2021-12-10 ENCOUNTER — HOSPITAL ENCOUNTER (INPATIENT)
Age: 56
LOS: 10 days | Discharge: HOME | DRG: 883 | End: 2021-12-20
Attending: EMERGENCY MEDICINE | Admitting: PSYCHIATRY & NEUROLOGY

## 2021-12-10 ENCOUNTER — HOSPITAL ENCOUNTER (EMERGENCY)
Age: 56
Discharge: PSYCHIATRIC OR SUBSTANCE USE FACILITY | End: 2021-12-10
Attending: EMERGENCY MEDICINE

## 2021-12-10 VITALS
HEIGHT: 64 IN | SYSTOLIC BLOOD PRESSURE: 142 MMHG | HEART RATE: 90 BPM | BODY MASS INDEX: 25.61 KG/M2 | DIASTOLIC BLOOD PRESSURE: 97 MMHG | WEIGHT: 150 LBS | TEMPERATURE: 97.1 F | RESPIRATION RATE: 20 BRPM | OXYGEN SATURATION: 95 %

## 2021-12-10 DIAGNOSIS — R45.851 SUICIDAL IDEATION: Primary | ICD-10-CM

## 2021-12-10 DIAGNOSIS — F32.9 MAJOR DEPRESSION, CHRONIC: ICD-10-CM

## 2021-12-10 DIAGNOSIS — F39 MOOD DISORDER (HCC): Primary | ICD-10-CM

## 2021-12-10 LAB
A/G RATIO: 1.5 (ref 1.1–2.2)
ACETAMINOPHEN LEVEL: <5 UG/ML (ref 10–30)
ALBUMIN SERPL-MCNC: 4.5 G/DL (ref 3.4–5)
ALP BLD-CCNC: 145 U/L (ref 40–129)
ALT SERPL-CCNC: 36 U/L (ref 10–40)
AMPHETAMINE SCREEN, URINE: ABNORMAL
ANION GAP SERPL CALCULATED.3IONS-SCNC: 13 MMOL/L (ref 3–16)
AST SERPL-CCNC: 35 U/L (ref 15–37)
BACTERIA: ABNORMAL /HPF
BARBITURATE SCREEN URINE: ABNORMAL
BASOPHILS ABSOLUTE: 0 K/UL (ref 0–0.2)
BASOPHILS RELATIVE PERCENT: 0.4 %
BENZODIAZEPINE SCREEN, URINE: ABNORMAL
BILIRUB SERPL-MCNC: 0.9 MG/DL (ref 0–1)
BILIRUBIN URINE: NEGATIVE
BLOOD, URINE: NEGATIVE
BUN BLDV-MCNC: 8 MG/DL (ref 7–20)
CALCIUM SERPL-MCNC: 10 MG/DL (ref 8.3–10.6)
CANNABINOID SCREEN URINE: POSITIVE
CHLORIDE BLD-SCNC: 96 MMOL/L (ref 99–110)
CLARITY: ABNORMAL
CO2: 25 MMOL/L (ref 21–32)
COCAINE METABOLITE SCREEN URINE: ABNORMAL
COLOR: YELLOW
CREAT SERPL-MCNC: 0.8 MG/DL (ref 0.6–1.1)
EOSINOPHILS ABSOLUTE: 0 K/UL (ref 0–0.6)
EOSINOPHILS RELATIVE PERCENT: 0.3 %
EPITHELIAL CELLS, UA: 21 /HPF (ref 0–5)
ETHANOL: NORMAL MG/DL (ref 0–0.08)
GFR AFRICAN AMERICAN: >60
GFR NON-AFRICAN AMERICAN: >60
GLUCOSE BLD-MCNC: 116 MG/DL (ref 70–99)
GLUCOSE URINE: NEGATIVE MG/DL
HCT VFR BLD CALC: 45.5 % (ref 36–48)
HEMOGLOBIN: 15.7 G/DL (ref 12–16)
HYALINE CASTS: 0 /LPF (ref 0–8)
KETONES, URINE: NEGATIVE MG/DL
LEUKOCYTE ESTERASE, URINE: NEGATIVE
LYMPHOCYTES ABSOLUTE: 1.3 K/UL (ref 1–5.1)
LYMPHOCYTES RELATIVE PERCENT: 16.4 %
Lab: ABNORMAL
MCH RBC QN AUTO: 34.7 PG (ref 26–34)
MCHC RBC AUTO-ENTMCNC: 34.5 G/DL (ref 31–36)
MCV RBC AUTO: 100.6 FL (ref 80–100)
METHADONE SCREEN, URINE: ABNORMAL
MICROSCOPIC EXAMINATION: YES
MONOCYTES ABSOLUTE: 0.5 K/UL (ref 0–1.3)
MONOCYTES RELATIVE PERCENT: 5.6 %
NEUTROPHILS ABSOLUTE: 6.3 K/UL (ref 1.7–7.7)
NEUTROPHILS RELATIVE PERCENT: 77.3 %
NITRITE, URINE: NEGATIVE
OPIATE SCREEN URINE: ABNORMAL
OXYCODONE URINE: ABNORMAL
PDW BLD-RTO: 13.1 % (ref 12.4–15.4)
PH UA: 7.5
PH UA: 7.5 (ref 5–8)
PHENCYCLIDINE SCREEN URINE: ABNORMAL
PLATELET # BLD: 301 K/UL (ref 135–450)
PMV BLD AUTO: 8.4 FL (ref 5–10.5)
POTASSIUM REFLEX MAGNESIUM: 3.6 MMOL/L (ref 3.5–5.1)
PROPOXYPHENE SCREEN: ABNORMAL
PROTEIN UA: NEGATIVE MG/DL
RBC # BLD: 4.52 M/UL (ref 4–5.2)
RBC UA: 1 /HPF (ref 0–4)
SALICYLATE, SERUM: <0.3 MG/DL (ref 15–30)
SARS-COV-2, NAAT: NOT DETECTED
SODIUM BLD-SCNC: 134 MMOL/L (ref 136–145)
SPECIFIC GRAVITY UA: 1.01 (ref 1–1.03)
TOTAL PROTEIN: 7.6 G/DL (ref 6.4–8.2)
URINE REFLEX TO CULTURE: YES
URINE TYPE: ABNORMAL
UROBILINOGEN, URINE: 0.2 E.U./DL
WBC # BLD: 8.2 K/UL (ref 4–11)
WBC UA: 12 /HPF (ref 0–5)

## 2021-12-10 PROCEDURE — 90715 TDAP VACCINE 7 YRS/> IM: CPT | Performed by: PHYSICIAN ASSISTANT

## 2021-12-10 PROCEDURE — 90471 IMMUNIZATION ADMIN: CPT | Performed by: PHYSICIAN ASSISTANT

## 2021-12-10 PROCEDURE — 87635 SARS-COV-2 COVID-19 AMP PRB: CPT

## 2021-12-10 PROCEDURE — 80179 DRUG ASSAY SALICYLATE: CPT

## 2021-12-10 PROCEDURE — 6370000000 HC RX 637 (ALT 250 FOR IP): Performed by: PHYSICIAN ASSISTANT

## 2021-12-10 PROCEDURE — 36415 COLL VENOUS BLD VENIPUNCTURE: CPT

## 2021-12-10 PROCEDURE — 85025 COMPLETE CBC W/AUTO DIFF WBC: CPT

## 2021-12-10 PROCEDURE — 80053 COMPREHEN METABOLIC PANEL: CPT

## 2021-12-10 PROCEDURE — 99285 EMERGENCY DEPT VISIT HI MDM: CPT

## 2021-12-10 PROCEDURE — 80307 DRUG TEST PRSMV CHEM ANLYZR: CPT

## 2021-12-10 PROCEDURE — 6360000002 HC RX W HCPCS: Performed by: PHYSICIAN ASSISTANT

## 2021-12-10 PROCEDURE — 99284 EMERGENCY DEPT VISIT MOD MDM: CPT

## 2021-12-10 PROCEDURE — 81001 URINALYSIS AUTO W/SCOPE: CPT

## 2021-12-10 PROCEDURE — 1240000000 HC EMOTIONAL WELLNESS R&B

## 2021-12-10 PROCEDURE — 82077 ASSAY SPEC XCP UR&BREATH IA: CPT

## 2021-12-10 PROCEDURE — 87086 URINE CULTURE/COLONY COUNT: CPT

## 2021-12-10 PROCEDURE — 80143 DRUG ASSAY ACETAMINOPHEN: CPT

## 2021-12-10 PROCEDURE — 6370000000 HC RX 637 (ALT 250 FOR IP): Performed by: PSYCHIATRY & NEUROLOGY

## 2021-12-10 RX ORDER — OLANZAPINE 10 MG/1
10 TABLET ORAL EVERY 8 HOURS PRN
Status: DISCONTINUED | OUTPATIENT
Start: 2021-12-10 | End: 2021-12-20 | Stop reason: HOSPADM

## 2021-12-10 RX ORDER — AMLODIPINE BESYLATE 2.5 MG/1
2.5 TABLET ORAL DAILY
Status: DISCONTINUED | OUTPATIENT
Start: 2021-12-11 | End: 2021-12-20 | Stop reason: HOSPADM

## 2021-12-10 RX ORDER — IBUPROFEN 400 MG/1
400 TABLET ORAL EVERY 6 HOURS PRN
Status: DISCONTINUED | OUTPATIENT
Start: 2021-12-10 | End: 2021-12-20 | Stop reason: HOSPADM

## 2021-12-10 RX ORDER — ONDANSETRON 4 MG/1
8 TABLET, ORALLY DISINTEGRATING ORAL ONCE
Status: COMPLETED | OUTPATIENT
Start: 2021-12-10 | End: 2021-12-10

## 2021-12-10 RX ORDER — LORAZEPAM 1 MG/1
1 TABLET ORAL ONCE
Status: COMPLETED | OUTPATIENT
Start: 2021-12-10 | End: 2021-12-10

## 2021-12-10 RX ORDER — ACETAMINOPHEN 325 MG/1
650 TABLET ORAL EVERY 4 HOURS PRN
Status: DISCONTINUED | OUTPATIENT
Start: 2021-12-10 | End: 2021-12-20 | Stop reason: HOSPADM

## 2021-12-10 RX ORDER — BENZTROPINE MESYLATE 1 MG/ML
2 INJECTION INTRAMUSCULAR; INTRAVENOUS 2 TIMES DAILY PRN
Status: DISCONTINUED | OUTPATIENT
Start: 2021-12-10 | End: 2021-12-20 | Stop reason: HOSPADM

## 2021-12-10 RX ORDER — CLONIDINE HYDROCHLORIDE 0.1 MG/1
0.1 TABLET ORAL 2 TIMES DAILY
Status: DISCONTINUED | OUTPATIENT
Start: 2021-12-10 | End: 2021-12-20 | Stop reason: HOSPADM

## 2021-12-10 RX ORDER — OLANZAPINE 10 MG/1
10 INJECTION, POWDER, LYOPHILIZED, FOR SOLUTION INTRAMUSCULAR EVERY 8 HOURS PRN
Status: DISCONTINUED | OUTPATIENT
Start: 2021-12-10 | End: 2021-12-20 | Stop reason: HOSPADM

## 2021-12-10 RX ORDER — MAGNESIUM HYDROXIDE/ALUMINUM HYDROXICE/SIMETHICONE 120; 1200; 1200 MG/30ML; MG/30ML; MG/30ML
30 SUSPENSION ORAL EVERY 6 HOURS PRN
Status: DISCONTINUED | OUTPATIENT
Start: 2021-12-10 | End: 2021-12-20 | Stop reason: HOSPADM

## 2021-12-10 RX ORDER — QUETIAPINE FUMARATE 25 MG/1
25 TABLET, FILM COATED ORAL NIGHTLY
Status: DISCONTINUED | OUTPATIENT
Start: 2021-12-10 | End: 2021-12-11

## 2021-12-10 RX ADMIN — QUETIAPINE FUMARATE 25 MG: 25 TABLET ORAL at 23:50

## 2021-12-10 RX ADMIN — CLONIDINE HYDROCHLORIDE 0.1 MG: 0.1 TABLET ORAL at 23:50

## 2021-12-10 RX ADMIN — ONDANSETRON 8 MG: 4 TABLET, ORALLY DISINTEGRATING ORAL at 10:40

## 2021-12-10 RX ADMIN — LORAZEPAM 1 MG: 1 TABLET ORAL at 10:40

## 2021-12-10 RX ADMIN — TETANUS TOXOID, REDUCED DIPHTHERIA TOXOID AND ACELLULAR PERTUSSIS VACCINE, ADSORBED 0.5 ML: 5; 2.5; 8; 8; 2.5 SUSPENSION INTRAMUSCULAR at 10:41

## 2021-12-10 ASSESSMENT — ENCOUNTER SYMPTOMS
SHORTNESS OF BREATH: 0
VOMITING: 0
NAUSEA: 0
COUGH: 0
DIARRHEA: 0
CONSTIPATION: 0
ABDOMINAL PAIN: 0

## 2021-12-10 ASSESSMENT — PAIN SCALES - GENERAL
PAINLEVEL_OUTOF10: 9
PAINLEVEL_OUTOF10: 0
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
## 2021-12-10 NOTE — ED PROVIDER NOTES
for chest pain. Gastrointestinal: Negative for abdominal pain, constipation, diarrhea, nausea and vomiting. Skin: Positive for wound (superficial wounds lower extremities and abdomen). Neurological: Negative for dizziness, seizures, weakness, numbness and headaches. Psychiatric/Behavioral: Positive for self-injury and suicidal ideas. The patient is nervous/anxious and is hyperactive. All other systems reviewed and are negative. Positives and Pertinent negatives as per HPI. Except as noted above in the ROS, all other systems were reviewed and negative.        PAST MEDICAL HISTORY     Past Medical History:   Diagnosis Date    Alcohol abuse     Depression     Diverticulosis     DOMINICK (generalized anxiety disorder)     GERD (gastroesophageal reflux disease)     Kidney stone     Tobacco abuse          SURGICAL HISTORY     Past Surgical History:   Procedure Laterality Date    COLONOSCOPY  2006    HYSTERECTOMY  2008         CURRENTMEDICATIONS       Previous Medications    AMLODIPINE (NORVASC) 2.5 MG TABLET    Take 2.5 mg by mouth daily    CLONIDINE (CATAPRES) 0.1 MG TABLET    Take 1 tablet by mouth 2 times daily    DULOXETINE (CYMBALTA) 30 MG EXTENDED RELEASE CAPSULE    Take 3 capsules by mouth daily    QUETIAPINE (SEROQUEL) 25 MG TABLET    Take 1 tablet by mouth nightly    TRAZODONE (DESYREL) 50 MG TABLET    Take 1 tablet by mouth nightly as needed for Sleep         ALLERGIES     Bupropion, Kiwi extract, Macrobid [nitrofurantoin macrocrystal], Morphine and related, and Varenicline    FAMILYHISTORY       Family History   Problem Relation Age of Onset    Diabetes Mother     High Cholesterol Mother     High Blood Pressure Mother     Diabetes Father     High Cholesterol Father     Cancer Father           SOCIAL HISTORY       Social History     Tobacco Use    Smoking status: Current Every Day Smoker     Packs/day: 1.00     Years: 40.00     Pack years: 40.00     Types: Cigarettes     Start date: 10/15/1981    Smokeless tobacco: Never Used   Vaping Use    Vaping Use: Never used   Substance Use Topics    Alcohol use: Yes     Alcohol/week: 42.0 standard drinks     Types: 42 Cans of beer per week     Comment: 3-4 alcohol per week    Drug use: Yes     Types: Marijuana [place])     Comment: occ       SCREENINGS             PHYSICAL EXAM    (up to 7 for level 4, 8 or more for level 5)     ED Triage Vitals [12/10/21 0907]   BP Temp Temp Source Pulse Resp SpO2 Height Weight   (!) 165/101 97.1 °F (36.2 °C) Temporal 89 20 97 % 5' 4\" (1.626 m) 150 lb (68 kg)       Physical Exam  Vitals and nursing note reviewed. Constitutional:       Appearance: Normal appearance. She is well-developed. She is not toxic-appearing or diaphoretic. HENT:      Head: Normocephalic. Right Ear: External ear normal.      Left Ear: External ear normal.      Nose: Nose normal.   Eyes:      General:         Right eye: No discharge. Left eye: No discharge. Conjunctiva/sclera: Conjunctivae normal.   Cardiovascular:      Rate and Rhythm: Normal rate and regular rhythm. Heart sounds: Normal heart sounds. No murmur heard. No friction rub. No gallop. Pulmonary:      Effort: Pulmonary effort is normal. No respiratory distress. Breath sounds: Normal breath sounds. No wheezing or rales. Musculoskeletal:         General: Normal range of motion. Cervical back: Normal range of motion and neck supple. Skin:     General: Skin is warm and dry. Coloration: Skin is not pale. Neurological:      Mental Status: She is alert and oriented to person, place, and time. Psychiatric:         Mood and Affect: Mood is anxious and depressed. Affect is tearful. Behavior: Behavior is cooperative. Thought Content: Thought content includes suicidal ideation. Thought content does not include homicidal ideation. Thought content includes suicidal plan. Thought content does not include homicidal plan. DIAGNOSTIC RESULTS   LABS:    Labs Reviewed   CBC WITH AUTO DIFFERENTIAL - Abnormal; Notable for the following components:       Result Value    .6 (*)     MCH 34.7 (*)     All other components within normal limits    Narrative:     Performed at:  [facility] [address], [facility], [address] [employer]   Phone [phone]   COMPREHENSIVE METABOLIC PANEL W/ REFLEX TO MG FOR LOW K - Abnormal; Notable for the following components:    Sodium 134 (*)     Chloride 96 (*)     Glucose 116 (*)     Alkaline Phosphatase 145 (*)     All other components within normal limits    Narrative:     Performed at:  [facility] [address], [facility], [address] [employer]   Phone [phone]   URINE RT REFLEX TO CULTURE - Abnormal; Notable for the following components:    Clarity, UA CLOUDY (*)     All other components within normal limits    Narrative:     Performed at:  [facility] [address], [facility], [address] [employer]   Phone [phone]   [address] - Abnormal; Notable for the following components:    Cannabinoid Scrn, Ur POSITIVE (*)     All other components within normal limits    Narrative:     Performed at:  [facility] [address], [facility], [address] [employer]   Phone [phone]   ACETAMINOPHEN LEVEL - Abnormal; Notable for the following components:    Acetaminophen Level <5 (*)     All other components within normal limits    Narrative:     Performed at:  [facility] [address], [facility], [address] [employer]   Phone [phone]   SALICYLATE LEVEL - Abnormal; Notable for the following components:    Salicylate, Serum <3.8 (*)     All other components within normal limits    Narrative:     Performed at:  [facility] [address], [facility], [address] [employer]   Phone [phone]   MICROSCOPIC URINALYSIS - Abnormal; Notable for the following components:    Bacteria, UA 1+ (*)     WBC, UA 12 (*)     Epithelial Cells, UA 21 (*)     All other components within normal limits    Narrative:     Performed at:  [facility] [address] [facility], [address] [place] [address]   Phone [phone], RAPID    Narrative:     Performed at:  [facility], [address] [place] [address]   Phone [phone]   CULTURE, URINE   ETHANOL    Narrative:     Performed at:  [facility] [address] [facility], [address] [place] [address]   Phone [phone]       All other labs were within normal range or not returned as of this dictation. EKG: All EKG's are interpreted by the Emergency Department Physician in the absence of a cardiologist.  Please see their note for interpretation of EKG. RADIOLOGY:   Non-plain film images such as CT, Ultrasound and MRI are read by the radiologist. Plain radiographic images are visualized and preliminarily interpreted by the ED Provider with the below findings:        Interpretation per the Radiologist below, if available at the time of this note:    No orders to display     No results found.         PROCEDURES   Unless otherwise noted below, none     Procedures    CRITICAL CARE TIME   N/A    CONSULTS:  None      EMERGENCY DEPARTMENT COURSE and DIFFERENTIAL DIAGNOSIS/MDM:   Vitals:    Vitals:    12/10/21 0907   BP: (!) 165/101   Pulse: 89   Resp: 20   Temp: 97.1 °F (36.2 °C)   TempSrc: Temporal   SpO2: 97%   Weight: 150 lb (68 kg)   Height: 5' 4\" (1.626 m)       Patient was given the following medications:  Medications   LORazepam (ATIVAN) tablet 1 mg (1 mg Oral Given 12/10/21 1040)   ondansetron (ZOFRAN-ODT) disintegrating tablet 8 mg (8 mg Oral Given 12/10/21 1040)   Tetanus-Diphth-Acell Pertussis (BOOSTRIX) injection 0.5 mL (0.5 mLs IntraMUSCular Given 12/10/21 1041)         Patient presents to the emergency department with complaints of suicidal ideation and concern for wanting to kill herself. Patient is very anxious and tearful at time of evaluation is requesting help. She does not wish to be sent home. She is very fearful that she will kill herself. She is given Ativan to help control her nerves. Tetanus is updated that she has several areas of superficial abrasions to the lower extremity and abdomen. She is feeling nauseous given Zofran. Medically cleared for psychiatric evaluation, will transfer. The patient tolerated their visit well. They were seen and evaluated by the attending physician, who agreed with the assessment and plan. FINAL IMPRESSION      1. Suicidal ideation    2. Major depression, chronic          DISPOSITION/PLAN   DISPOSITION Decision To Transfer 12/10/2021 01:12:12 PM      PATIENT REFERREDTO:  No follow-up provider specified.     DISCHARGE MEDICATIONS:  New Prescriptions    No medications on file       DISCONTINUED MEDICATIONS:  Discontinued Medications    No medications on file              (Please note that portions of this note were completed with a voice recognition program.  Efforts were made to edit the dictations but occasionally words are mis-transcribed.)    SUNDAR Spaulding (electronically signed)        SUNDAR Adler  12/10/21 5828

## 2021-12-10 NOTE — ED NOTES
Bed: 07  Expected date:   Expected time:   Means of arrival: [facility]  Comments:     Lisseth Calderon RN  12/10/21 6096

## 2021-12-11 LAB — URINE CULTURE, ROUTINE: NORMAL

## 2021-12-11 PROCEDURE — 99223 1ST HOSP IP/OBS HIGH 75: CPT | Performed by: PSYCHIATRY & NEUROLOGY

## 2021-12-11 PROCEDURE — 1240000000 HC EMOTIONAL WELLNESS R&B

## 2021-12-11 PROCEDURE — 99221 1ST HOSP IP/OBS SF/LOW 40: CPT

## 2021-12-11 PROCEDURE — 6370000000 HC RX 637 (ALT 250 FOR IP): Performed by: PSYCHIATRY & NEUROLOGY

## 2021-12-11 RX ORDER — TRAZODONE HYDROCHLORIDE 50 MG/1
50 TABLET ORAL NIGHTLY
Status: DISCONTINUED | OUTPATIENT
Start: 2021-12-11 | End: 2021-12-13

## 2021-12-11 RX ORDER — POLYETHYLENE GLYCOL 3350 17 G
2 POWDER IN PACKET (EA) ORAL
Status: DISCONTINUED | OUTPATIENT
Start: 2021-12-11 | End: 2021-12-20 | Stop reason: HOSPADM

## 2021-12-11 RX ORDER — LAMOTRIGINE 25 MG/1
50 TABLET ORAL DAILY
Status: DISCONTINUED | OUTPATIENT
Start: 2021-12-11 | End: 2021-12-20 | Stop reason: HOSPADM

## 2021-12-11 RX ORDER — NICOTINE 21 MG/24HR
1 PATCH, TRANSDERMAL 24 HOURS TRANSDERMAL DAILY
Status: DISCONTINUED | OUTPATIENT
Start: 2021-12-11 | End: 2021-12-20 | Stop reason: HOSPADM

## 2021-12-11 RX ADMIN — IBUPROFEN 400 MG: 400 TABLET, FILM COATED ORAL at 06:04

## 2021-12-11 RX ADMIN — TRAZODONE HYDROCHLORIDE 50 MG: 50 TABLET ORAL at 21:26

## 2021-12-11 RX ADMIN — AMLODIPINE BESYLATE 2.5 MG: 2.5 TABLET ORAL at 08:40

## 2021-12-11 RX ADMIN — DULOXETINE HYDROCHLORIDE 90 MG: 60 CAPSULE, DELAYED RELEASE ORAL at 08:39

## 2021-12-11 RX ADMIN — CLONIDINE HYDROCHLORIDE 0.1 MG: 0.1 TABLET ORAL at 21:26

## 2021-12-11 RX ADMIN — LAMOTRIGINE 50 MG: 25 TABLET ORAL at 09:02

## 2021-12-11 RX ADMIN — OLANZAPINE 10 MG: 10 TABLET, FILM COATED ORAL at 07:56

## 2021-12-11 RX ADMIN — CLONIDINE HYDROCHLORIDE 0.1 MG: 0.1 TABLET ORAL at 08:39

## 2021-12-11 ASSESSMENT — PATIENT HEALTH QUESTIONNAIRE - PHQ9: SUM OF ALL RESPONSES TO PHQ QUESTIONS 1-9: 27

## 2021-12-11 ASSESSMENT — SLEEP AND FATIGUE QUESTIONNAIRES
AVERAGE NUMBER OF SLEEP HOURS: 0
DIFFICULTY FALLING ASLEEP: YES
DO YOU USE A SLEEP AID: YES
DO YOU HAVE DIFFICULTY SLEEPING: YES
RESTFUL SLEEP: NO
SLEEP PATTERN: DIFFICULTY FALLING ASLEEP;DISTURBED/INTERRUPTED SLEEP
DIFFICULTY ARISING: NO
DIFFICULTY STAYING ASLEEP: YES

## 2021-12-11 ASSESSMENT — PAIN SCALES - GENERAL: PAINLEVEL_OUTOF10: 7

## 2021-12-11 ASSESSMENT — LIFESTYLE VARIABLES: HISTORY_ALCOHOL_USE: YES

## 2021-12-11 NOTE — PROGRESS NOTES
Behavioral Services  Medicare Certification Upon Admission    I certify that this patient's inpatient psychiatric hospital admission is medically necessary for:    [x] (1) Treatment which could reasonably be expected to improve this patient's condition,       [x] (2) Or for diagnostic study;     AND     [x](2) The inpatient psychiatric services are provided while the individual is under the care of a physician and are included in the individualized plan of care.     Estimated length of stay/service 2-3 d    Plan for post-hospital care outpt    Electronically signed by Belen Bennett MD on 12/11/2021 at 8:36 AM

## 2021-12-11 NOTE — H&P
Hospital Medicine History & Physical      PCP: MITCHELL Franks NP    Date of Admission: 12/10/2021    Date of Service: Pt seen/examined on 12/11/2021     Chief Complaint:    Chief Complaint   Patient presents with    Psychiatric Evaluation     Sent from [facility] due to self harm     History Of Present Illness: The patient is a 55 y.o. female with HTN, GERD, marijuana use, tobacco dependence who presented to [facility] for borderline personality disorder. Patient was seen and evaluated in the ED by the ED medical provider, patient was medically cleared for admission to [facility]. This note serves as an admission medical H&P. Tobacco use: 1/2 pack/day x 30 plus years  ETOH use: Occasional  Illicit drug use: Denies    Patient denies any medical complaints. Past Medical History:        Diagnosis Date    Alcohol abuse     Depression     Diverticulosis     DOMINICK (generalized anxiety disorder)     GERD (gastroesophageal reflux disease)     Kidney stone     Tobacco abuse        Past Surgical History:        Procedure Laterality Date    COLONOSCOPY  2006    HYSTERECTOMY  2008       Medications Prior to Admission:    Prior to Admission medications    Medication Sig Start Date End Date Taking? Authorizing Provider   DULoxetine (CYMBALTA) 30 MG extended release capsule Take 3 capsules by mouth daily 10/18/21 11/28/21  Chao Robertson MD   QUEtiapine (SEROQUEL) 25 MG tablet Take 1 tablet by mouth nightly 10/18/21 11/28/21  Chao Robertson MD   amLODIPine (NORVASC) 2.5 MG tablet Take 2.5 mg by mouth daily    Historical Provider, MD   cloNIDine (CATAPRES) 0.1 MG tablet Take 1 tablet by mouth 2 times daily 12/1/20   Ari Marlow MD       Allergies:  Bupropion, Kiwi extract, Macrobid [nitrofurantoin macrocrystal], Morphine and related, and Varenicline    Social History:  The patient currently lives at home alone    TOBACCO:   reports that she has been smoking cigarettes.  She started smoking about 40 years ago. She has a 40.00 pack-year smoking history. She has never used smokeless tobacco.  ETOH:   reports previous alcohol use of about 42.0 standard drinks of alcohol per week. Family History:   Positive as follows:        Problem Relation Age of Onset    Diabetes Mother     High Cholesterol Mother     High Blood Pressure Mother     Diabetes Father     High Cholesterol Father     Cancer Father        REVIEW OF SYSTEMS:       Constitutional: Negative for fever   HENT: Negative for sore throat   Eyes: Negative for redness   Respiratory: Negative  for dyspnea, cough   Cardiovascular: Negative for chest pain   Gastrointestinal: Negative for vomiting, diarrhea   Genitourinary: Negative for hematuria   Musculoskeletal: Negative for arthralgias   Skin: Negative for rash   Neurological: Negative for syncope    Hematological: Negative for easy bruising/bleeding   Psychiatric/Behavorial: Per psychiatry team evaluation     PHYSICAL EXAM:    BP (!) 163/103   Pulse 73   Temp 98 °F (36.7 °C) (Temporal)   Resp 16   Ht 5' 4\" (1.626 m)   Wt 150 lb (68 kg)   LMP  (LMP Unknown)   SpO2 96%   BMI 25.75 kg/m²     Gen: No distress. Alert. Eyes: PERRL. No sclera icterus. No conjunctival injection. ENT: No discharge. Pharynx clear. Neck: Trachea midline. Resp: No accessory muscle use. No crackles. No wheezes. No rhonchi. CV: Regular rate. Regular rhythm. No murmur. No rub. No edema. GI: Non-tender. Non-distended. Normal bowel sounds. Skin: Warm and dry. No nodule on exposed extremities. No rash on exposed extremities. M/S: No cyanosis. No joint deformity. No clubbing. Neuro: Awake. No focal neurologic deficit on exam.  Cranial nerves II through XII intact. Patient is able to ambulate without difficulty.   Psych: Per psychiatry team evaluation     CBC:   Recent Labs     12/10/21  0920   WBC 8.2   HGB 15.7   HCT 45.5   .6*        BMP:   Recent Labs 12/10/21  0920   *   K 3.6   CL 96*   CO2 25   BUN 8   CREATININE 0.8     LIVER PROFILE:   Recent Labs     12/10/21  0920   AST 35   ALT 36   BILITOT 0.9   ALKPHOS 145*     UA:  Recent Labs     12/10/21  0920   COLORU YELLOW   PHUR 7.5  7.5   WBCUA 12*   RBCUA 1   BACTERIA 1+*   CLARITYU CLOUDY*   SPECGRAV 1.009   LEUKOCYTESUR Negative   UROBILINOGEN 0.2   BILIRUBINUR Negative   BLOODU Negative   GLUCOSEU Negative     U/A:    Lab Results   Component Value Date    COLORU YELLOW 12/10/2021    WBCUA 12 12/10/2021    RBCUA 1 12/10/2021    BACTERIA 1+ 12/10/2021    CLARITYU CLOUDY 12/10/2021    SPECGRAV 1.009 12/10/2021    LEUKOCYTESUR Negative 12/10/2021    BLOODU Negative 12/10/2021    GLUCOSEU Negative 12/10/2021    GLUCOSEU NEGATIVE 02/05/2010    AMORPHOUS 1+ 11/29/2020     Results for Jose L Sinha (MRN [id number]) as of 12/11/2021 12:10   Ref. Range 12/10/2021 09:20   Amphetamine Screen, Urine Latest Ref Range: Negative <1000ng/mL  Neg   Benzodiazepine Screen, Urine Latest Ref Range: Negative <200 ng/mL  Neg   Cocaine Metabolite Screen, Urine Latest Ref Range: Negative <300 ng/mL  Neg   Methadone Screen, Urine Latest Ref Range: Negative <300 ng/mL  Neg   Opiate Scrn, Ur Latest Ref Range: Negative <300 ng/mL  Neg   Oxycodone Urine Latest Ref Range: Negative <100 ng/ml  Neg   PCP Screen, Urine Latest Ref Range: Negative <25 ng/mL  Neg   Cannabinoid Scrn, Ur Latest Ref Range: Negative <50 ng/mL  POSITIVE (A)     CULTURES  Results for Jose L Sinha (MRN [id number]) as of 12/11/2021 11:58   Ref.  Range 12/10/2021 10:45   SARS-CoV-2, NAAT Latest Ref Range: Not Detected  Not Detected     Component 12/10/21 0920   Urine Culture, Routine No growth at 18 to 36 hours      RADIOLOGY  No orders to display       ASSESSMENT/PLAN:  #Borderline personality disorder  - per psychiatry team    #HTN   -BP elevated today, was not receiving meds  -Continue Norvasc and clonidine  -Monitor BP    #GERD   -Maalox as needed    #marijuana use  -Denies but tox screen is positive  -Recommend cessation    #tobacco dependence  -Recommend cessation  -Nicotine patch    Azucena Salas PA-C  12/11/2021 11:55 AM
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
## 2021-12-11 NOTE — ED NOTES
Pt arrived via stretcher from [id number] [facility]. Vital signs obtained and all belongings secured in locker. Will monitor pt for safety.      [address] [place] [address]  12/10/21 2524

## 2021-12-11 NOTE — ED NOTES
Pt currently resting in room - no signs or symptoms of distress noted     [facility], RN  12/10/21 3662

## 2021-12-11 NOTE — PLAN OF CARE
Problem: Suicide risk  Goal: Provide patient with safe environment  Description: Provide patient with safe environment  Outcome: Ongoing     Problem: Altered Mood, Depressive Behavior:  Goal: Ability to disclose and discuss suicidal ideas will improve  Description: Ability to disclose and discuss suicidal ideas will improve  Outcome: Ongoing     Problem: Altered Mood, Depressive Behavior:  Goal: Absence of self-harm  Description: Absence of self-harm  Outcome: Ongoing   La was visible in the milieu, c/o severe anxiety, \"feel like I'm gonna go off\"  Offered and received Zyprex orally for her anxiety. She denies SI,HI or AVH and states her H/A is better since receiving Motrin earlier. She CFS and does not appear to be RTIS. Took diet well.
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
## 2021-12-11 NOTE — ED NOTES
Level of Care Disposition: Admit  Patient was seen by ED provider and [facility] AN AFFILIATE OF [facility] staff. The case presented to psychiatric provider on-call Dr. Chele Gutierrez. Based on the ED evaluation and information presented to the provider by Toño Mclaughlin RN it is the recommendation of the on call psychiatric provider that inpatient hospitalization is the least restrictive environment for the patient at this time. The patient will be admitted to the inpatient unit. Admitting provider did not order suicide precautions based on patient nereida for safety.          Ben Dunn RN  12/10/21 0344

## 2021-12-12 PROCEDURE — 6370000000 HC RX 637 (ALT 250 FOR IP)

## 2021-12-12 PROCEDURE — 6370000000 HC RX 637 (ALT 250 FOR IP): Performed by: PSYCHIATRY & NEUROLOGY

## 2021-12-12 PROCEDURE — 1240000000 HC EMOTIONAL WELLNESS R&B

## 2021-12-12 RX ORDER — SENNA AND DOCUSATE SODIUM 50; 8.6 MG/1; MG/1
2 TABLET, FILM COATED ORAL DAILY
Status: DISCONTINUED | OUTPATIENT
Start: 2021-12-12 | End: 2021-12-20 | Stop reason: HOSPADM

## 2021-12-12 RX ADMIN — LAMOTRIGINE 50 MG: 25 TABLET ORAL at 08:25

## 2021-12-12 RX ADMIN — CLONIDINE HYDROCHLORIDE 0.1 MG: 0.1 TABLET ORAL at 21:11

## 2021-12-12 RX ADMIN — TRAZODONE HYDROCHLORIDE 50 MG: 50 TABLET ORAL at 21:12

## 2021-12-12 RX ADMIN — CLONIDINE HYDROCHLORIDE 0.1 MG: 0.1 TABLET ORAL at 08:24

## 2021-12-12 RX ADMIN — STANDARDIZED SENNA CONCENTRATE AND DOCUSATE SODIUM 2 TABLET: 8.6; 5 TABLET ORAL at 16:59

## 2021-12-12 RX ADMIN — DULOXETINE HYDROCHLORIDE 90 MG: 60 CAPSULE, DELAYED RELEASE ORAL at 08:25

## 2021-12-12 RX ADMIN — OLANZAPINE 10 MG: 10 TABLET, FILM COATED ORAL at 14:05

## 2021-12-12 RX ADMIN — AMLODIPINE BESYLATE 2.5 MG: 2.5 TABLET ORAL at 08:25

## 2021-12-12 NOTE — BH NOTE
Patient approached tech during VS stating she felt \"jittery inside\". Writer went to assess patient and she was in a deep sleep. /82, P 112. Skin pink/wnl, respirations even and easy.

## 2021-12-12 NOTE — GROUP NOTE
Group Therapy Note    Date: 12/11/2021    Group Start Time: 2015  Group End Time: 2045  Group Topic: Wrap-Up    [facility]        Group Therapy Note    Attendees: Goals and importance of goal setting discussed. Night time milieu activities discussed. Patient's Goal:  Get squared    Notes:  First day    Status After Intervention:  Improved    Participation Level:  Active Listener and Interactive    Participation Quality: Appropriate and Attentive      Speech:  normal      Thought Process/Content: Logical  Linear      Affective Functioning: Congruent      Mood: euthymic      Level of consciousness:  Alert and Oriented x4      Response to Learning: Progressing to goal      Endings: None Reported    Modes of Intervention: Support      Discipline Responsible: Behavorial Health Tech      Signature:  Guzman Ward

## 2021-12-12 NOTE — PLAN OF CARE
Patient complains of LLQ abdominal pain x 2 weeks. States she cannot remember when she had her last bowel movement. She is nauseous but no vomiting. She is tolerating PO. She states she does have a history of a partial hysterectomy. Abdominal exam: Soft, non-distended. Normal bowel sounds appreciated. No masses or hernia. Mild TTP in LLQ. With mild symptoms and prolonged time frame SBO, ovarian torsion and diverticulitis low on my differential. Likely related to constipation. Will add Senokot-S daily.      Please notify if new or worsening symptoms    Anthony Smith PA-C  12/12/2021 2:16 PM

## 2021-12-12 NOTE — PLAN OF CARE
Out of room and present in milieu this evening. Stated she still feels \"a little depressed\" but improved from admission. Denies SI/HI/hallucinations. Attended group and ate snack. Compliant with scheduled medications. Monitoring continued per orders.     Problem: Altered Mood, Depressive Behavior:  Goal: Able to verbalize and/or display a decrease in depressive symptoms  Description: Able to verbalize and/or display a decrease in depressive symptoms  Outcome: Ongoing  Goal: Ability to disclose and discuss suicidal ideas will improve  Description: Ability to disclose and discuss suicidal ideas will improve  Outcome: Ongoing  Goal: Able to verbalize support systems  Description: Able to verbalize support systems  Outcome: Ongoing  Goal: Absence of self-harm  Description: Absence of self-harm  Outcome: Ongoing
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
## 2021-12-12 NOTE — PLAN OF CARE
Problem: Altered Mood, Depressive Behavior:  Goal: Able to verbalize and/or display a decrease in depressive symptoms  Description: Able to verbalize and/or display a decrease in depressive symptoms  12/12/2021 1053 by Tyler Adams RN  Outcome: Ongoing   Boogie Holder has been intermittently visible this shift. She was moved to a different room (on the large side of the unit) due to her feeling like the mileu on the small side would not be conducive to her treatment. She has been med compliant. She is alert and oriented with overall complaints of feeling sad, depressed and anxious. She states \"my insides just feel like they are shaking. \"  When asked how long this has been happening, she states \"probably for about a week prior to coming in.\"  Patient denies suicidal thoughts at present, she says, \"if I wasn't in here, I would likely have thoughts thoughts. \"  She denies homicidal thoughts. She denies hallucinations. She has superficial self inflicted lacerations to lower abdomen and bilateral lower legs. Area are closed and non-red, healing well. She has no urges for self harm at this time. Boogie Holder expresses her lack of motivation and overwhelming anxiety. She does believe the lamictal she is taking is helping her symptoms, but feels it may need changed to HS due to her  increased tiredness after taking it. Writer encouraged her to discuss this with the doctor. She also endorses some mild lower and left side abdominal pain. She states she has not ate well for about a week and just starting eating yesterday so she does not believe it is constipation.   Will monitor symptoms and discuss with medical.

## 2021-12-13 PROCEDURE — 6370000000 HC RX 637 (ALT 250 FOR IP)

## 2021-12-13 PROCEDURE — 6370000000 HC RX 637 (ALT 250 FOR IP): Performed by: PSYCHIATRY & NEUROLOGY

## 2021-12-13 PROCEDURE — 1240000000 HC EMOTIONAL WELLNESS R&B

## 2021-12-13 PROCEDURE — 99233 SBSQ HOSP IP/OBS HIGH 50: CPT | Performed by: PSYCHIATRY & NEUROLOGY

## 2021-12-13 RX ORDER — QUETIAPINE FUMARATE 25 MG/1
25 TABLET, FILM COATED ORAL NIGHTLY
Status: DISCONTINUED | OUTPATIENT
Start: 2021-12-13 | End: 2021-12-14

## 2021-12-13 RX ADMIN — QUETIAPINE FUMARATE 25 MG: 25 TABLET ORAL at 21:35

## 2021-12-13 RX ADMIN — LAMOTRIGINE 50 MG: 25 TABLET ORAL at 08:34

## 2021-12-13 RX ADMIN — CLONIDINE HYDROCHLORIDE 0.1 MG: 0.1 TABLET ORAL at 21:35

## 2021-12-13 RX ADMIN — AMLODIPINE BESYLATE 2.5 MG: 2.5 TABLET ORAL at 08:34

## 2021-12-13 RX ADMIN — CLONIDINE HYDROCHLORIDE 0.1 MG: 0.1 TABLET ORAL at 08:34

## 2021-12-13 RX ADMIN — DULOXETINE HYDROCHLORIDE 90 MG: 60 CAPSULE, DELAYED RELEASE ORAL at 08:34

## 2021-12-13 RX ADMIN — STANDARDIZED SENNA CONCENTRATE AND DOCUSATE SODIUM 2 TABLET: 8.6; 5 TABLET ORAL at 08:33

## 2021-12-13 RX ADMIN — ACETAMINOPHEN 650 MG: 325 TABLET ORAL at 14:14

## 2021-12-13 ASSESSMENT — PAIN SCALES - GENERAL
PAINLEVEL_OUTOF10: 0
PAINLEVEL_OUTOF10: 8

## 2021-12-13 NOTE — PLAN OF CARE
Problem: Altered Mood, Depressive Behavior:  Goal: Able to verbalize and/or display a decrease in depressive symptoms  Description: Able to verbalize and/or display a decrease in depressive symptoms  12/12/2021 2358 by Ben Hamilton RN  Outcome: Ongoing     Problem: Altered Mood, Depressive Behavior:  Goal: Absence of self-harm  Description: Absence of self-harm  Outcome: Ongoing   Rometta Meigs has been out in the day room with peers and attended group this shift. Patient medication compliant. Denies current SI/HI, denies A/V/H. Patient reports feeling 10% better than she did on admission.

## 2021-12-13 NOTE — GROUP NOTE
Group Therapy Note    Date: 12/12/2021    Group Start Time: 2025  Group End Time: 2100  Group Topic: Wrap-Up    [address] [facility]        Group Therapy Note    Attendees: Goals and importance of goal setting discussed. Night time milieu activities discussed. Patient's Goal:  Journal, get room changed    Notes:  Successful     Status After Intervention:  Improved    Participation Level:  Active Listener and Interactive    Participation Quality: Appropriate, Attentive and Sharing      Speech:  normal      Thought Process/Content: Logical  Linear      Affective Functioning: Congruent      Mood: euthymic      Level of consciousness:  Alert and Oriented x4      Response to Learning: Progressing to goal      Endings: None Reported    Modes of Intervention: Support      Discipline Responsible: Behavorial Health Tech      Signature:  Abe Mohs Writer received update from Jorge Sneed, School Counselor. Pt has a 504 plan at school.  Mr. HURT came up with a plan with pt and his mom last week for classes.  He has been a virtual student this semester and is able to attend his first 3 classes before he leaves for treatment daily.  His  developed a creative solution for that class which seems to be going well.  His math class is an independent online program so he can work on that anytime.  As long as pt puts in the effort, he should do ok academically.  Continue plan of care. Jelly Redmond, School Liaison
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
## 2021-12-13 NOTE — GROUP NOTE
Group Therapy Note    Date: 12/13/2021    Group Start Time: 1000  Group End Time: 8509  Group Topic: Psychoeducation    [address] [facility], [address]        Group Therapy Note    Attendees: 9    Participants learned about Effective Communication Skills as well as Listening Skills. Notes: La attended group and was engaged and participated in group discussion. Status After Intervention:  Improved    Participation Level:  Active Listener and Interactive    Participation Quality: Appropriate and Attentive      Speech:  normal      Thought Process/Content: Logical      Affective Functioning: Congruent      Mood: euthymic      Level of consciousness:  Alert, Oriented x4 and Attentive      Response to Learning: Able to verbalize/acknowledge new learning and Progressing to goal      Endings: None Reported    Modes of Intervention: Education      Discipline Responsible: /Counselor      Signature:  ISAAK Hampton

## 2021-12-13 NOTE — PLAN OF CARE
Problem: Altered Mood, Depressive Behavior:  Goal: Able to verbalize and/or display a decrease in depressive symptoms  Description: Able to verbalize and/or display a decrease in depressive symptoms  12/13/2021 1029 by Tye Meyers LPN  Outcome: Ongoing  Soft-spoken. Eating. Attending group.

## 2021-12-14 PROCEDURE — 99233 SBSQ HOSP IP/OBS HIGH 50: CPT | Performed by: PSYCHIATRY & NEUROLOGY

## 2021-12-14 PROCEDURE — 6370000000 HC RX 637 (ALT 250 FOR IP): Performed by: PSYCHIATRY & NEUROLOGY

## 2021-12-14 PROCEDURE — 1240000000 HC EMOTIONAL WELLNESS R&B

## 2021-12-14 RX ORDER — QUETIAPINE FUMARATE 25 MG/1
50 TABLET, FILM COATED ORAL NIGHTLY
Status: DISCONTINUED | OUTPATIENT
Start: 2021-12-14 | End: 2021-12-20 | Stop reason: HOSPADM

## 2021-12-14 RX ADMIN — AMLODIPINE BESYLATE 2.5 MG: 2.5 TABLET ORAL at 08:41

## 2021-12-14 RX ADMIN — CLONIDINE HYDROCHLORIDE 0.1 MG: 0.1 TABLET ORAL at 21:40

## 2021-12-14 RX ADMIN — LAMOTRIGINE 50 MG: 25 TABLET ORAL at 08:41

## 2021-12-14 RX ADMIN — QUETIAPINE FUMARATE 50 MG: 25 TABLET ORAL at 21:40

## 2021-12-14 RX ADMIN — DULOXETINE HYDROCHLORIDE 90 MG: 60 CAPSULE, DELAYED RELEASE ORAL at 08:41

## 2021-12-14 RX ADMIN — CLONIDINE HYDROCHLORIDE 0.1 MG: 0.1 TABLET ORAL at 08:40

## 2021-12-14 RX ADMIN — ACETAMINOPHEN 650 MG: 325 TABLET ORAL at 20:09

## 2021-12-14 RX ADMIN — ACETAMINOPHEN 650 MG: 325 TABLET ORAL at 11:02

## 2021-12-14 ASSESSMENT — PAIN SCALES - GENERAL
PAINLEVEL_OUTOF10: 5
PAINLEVEL_OUTOF10: 3
PAINLEVEL_OUTOF10: 4

## 2021-12-14 NOTE — FLOWSHEET NOTE
Purposeful Rounding    Patient Location: Day room    Patient willing to engage in conversation: Yes    Presentation/behavior: Cooperative and Pleasant    Affect: Neutral/Euthymic(normal)    Concerns reported: none noted    PRN medications given: none    Environmental assessment: Room free from clutter, Clear path to bathroom  and Adequate lighting    Fall prevention interventions in place: Lighting appropriate, Room free of clutter and Clear path to bathroom     Daily Blevins Fall Risk Score: low      Electronically signed by Dave Zavala RN on 12/13/21 at 9:54 PM EST

## 2021-12-14 NOTE — FLOWSHEET NOTE
Purposeful Rounding    Patient Location: Patient room    Patient willing to engage in conversation: pt sleeping    Presentation/behavior: pt sleeping    Affect: pt sleeping    Concerns reported: none    PRN medications given: none    Environmental assessment: Room free from clutter, Clear path to bathroom , Adequate lighting and No safety hazards noted    Fall prevention interventions in place: Lighting appropriate, Room free of clutter and Clear path to bathroom    Daily Blevins Fall Risk Score: low      Electronically signed by Wilber Arana RN on 12/14/21 at 12:03 AM EST

## 2021-12-14 NOTE — FLOWSHEET NOTE
Purposeful Rounding    Patient Location: Patient room    Patient willing to engage in conversation: Yes    Presentation/behavior: Cooperative    Affect: Flat/blunted    Concerns reported: trouble sleeping    PRN medications given: none at this time, requested medication from Dr. Rock Coronado assessment: Room free from clutter, Clear path to bathroom , Adequate lighting and No safety hazards noted    Fall prevention interventions in place: Lighting appropriate, Room free of clutter and Clear path to bathroom    Daily Blevins Fall Risk Score: low      Electronically signed by Armando Jarrett RN on 12/14/21 at 2:42 AM EST

## 2021-12-14 NOTE — GROUP NOTE
Group Therapy Note    Date: 12/14/2021    Group Start Time: 1100  Group End Time: 1150  Group Topic: Psychoeducation    [address] [place] [address]res        Group Therapy Note    Attendees:4         Patient's Goal: to learn and discuss how to establish appropriate boundaries and limits with others and to apply to our lives. Notes:  Pt joined in on group discussion and was able to apply to her life. Pt supportive of another group member. Status After Intervention:  Improved    Participation Level:  Active Listener    Participation Quality: Appropriate, Attentive, Sharing and Supportive      Speech:  normal      Thought Process/Content: Logical      Affective Functioning: Congruent      Mood: anxious      Level of consciousness:  Alert, Oriented x4 and Attentive      Response to Learning: Able to verbalize current knowledge/experience, Able to verbalize/acknowledge new learning and Progressing to goal      Endings: None Reported    Modes of Intervention: Education, Support, Socialization, Exploration and Clarifying      Discipline Responsible: /Counselor      Signature:  ISAAK Valdes

## 2021-12-14 NOTE — PLAN OF CARE
Problem: Suicide risk  Goal: Provide patient with safe environment  Description: Provide patient with safe environment  Outcome: Ongoing     Problem: Altered Mood, Depressive Behavior:  Goal: Able to verbalize and/or display a decrease in depressive symptoms  Description: Able to verbalize and/or display a decrease in depressive symptoms  Outcome: Ongoing  Goal: Absence of self-harm  Description: Absence of self-harm  Outcome: Ongoing     Patient up ad keshawn. Visible and social on milieu. Pt reports ongoing depression and anxiety, however, states that it is improving. Pt had had trouble sleeping, scheduled seroquel 25mg was given at scheduled time. Pt restless for a period of time; however, was able to get back to sleep. Pt offered much emotional support. Monitored for safety and comfort.

## 2021-12-14 NOTE — PLAN OF CARE
Problem: Suicide risk  Goal: Provide patient with safe environment  Description: Provide patient with safe environment  12/14/2021 1126 by Kindra Cruz RN  Outcome: Ongoing  12/14/2021 0426 by Marcos Melissa RN  Outcome: Ongoing     Problem: Altered Mood, Depressive Behavior:  Goal: Able to verbalize and/or display a decrease in depressive symptoms  Description: Able to verbalize and/or display a decrease in depressive symptoms  12/14/2021 1126 by Kindra Cruz RN  Outcome: Ongoing  12/14/2021 0426 by Marcos Melissa RN  Outcome: Ongoing  Goal: Absence of self-harm  Description: Absence of self-harm  12/14/2021 0426 by Marcos Melissa RN  Outcome: Ongoing   Pt calm, cooperative with all care. Compliant with medication. ADLs completed per self without prompt. Appetite is good. Attends groups. Denies desire to self or other harm. Voices complaints of sore throat but feels it is due to dry heat.  Temp 98.8

## 2021-12-14 NOTE — PROGRESS NOTES
Department of Psychiatry  AttendingProgress Note  Chief Complaint: depression  La is feeling better overall. Sleep poor. No SI. Hi softener stopped. Attending groups. Increase seroquel 50 mg HS  Patient's chart was reviewed and collaborated with  about the treatment plan. SUBJECTIVE:    Patient is feeling better. Suicidal ideation:  denies suicidal ideation. Patient does not have medication side effects. ROS: Patient has new complaints: no  Sleeping adequately:  No:    Appetite adequate: Yes  Attending groups: Yes  Visitors:No    OBJECTIVE    Physical  VITALS:  /76   Pulse 79   Temp 98.8 °F (37.1 °C) (Temporal)   Resp 16   Ht 5' 4\" (1.626 m)   Wt 150 lb (68 kg)   LMP  (LMP Unknown)   SpO2 98%   BMI 25.75 kg/m²     Mental Status Examination:  Patients appearance was street clothes. Thoughts are Goal directed. Homicidal ideations none. No abnormal movements, tics or mannerisms. Memory intact Aims 0. Concentration Good. Alert and oriented X 4. Insight and Judgement impaired insight. Patient was cooperative. Patient gait normal. Mood depressed, affect depressed affect Hallucinations Absent, suicidal ideations no specific plan to harm self Speech normal volume  Data  Labs:   No visits with results within 2 Day(s) from this visit.    Latest known visit with results is:   Admission on 12/10/2021, Discharged on 12/10/2021   Component Date Value Ref Range Status    WBC 12/10/2021 8.2  4.0 - 11.0 K/uL Final    RBC 12/10/2021 4.52  4.00 - 5.20 M/uL Final    Hemoglobin 12/10/2021 15.7  12.0 - 16.0 g/dL Final    Hematocrit 12/10/2021 45.5  36.0 - 48.0 % Final    MCV 12/10/2021 100.6* 80.0 - 100.0 fL Final    MCH 12/10/2021 34.7* 26.0 - 34.0 pg Final    MCHC 12/10/2021 34.5  31.0 - 36.0 g/dL Final    RDW 12/10/2021 13.1  12.4 - 15.4 % Final    Platelets 08/91/5610 301  135 - 450 K/uL Final    MPV 12/10/2021 8.4  5.0 - 10.5 fL Final    Neutrophils % 12/10/2021 77.3  % Final    Lymphocytes % 12/10/2021 16.4  % Final    Monocytes % 12/10/2021 5.6  % Final    Eosinophils % 12/10/2021 0.3  % Final    Basophils % 12/10/2021 0.4  % Final    Neutrophils Absolute 12/10/2021 6.3  1.7 - 7.7 K/uL Final    Lymphocytes Absolute 12/10/2021 1.3  1.0 - 5.1 K/uL Final    Monocytes Absolute 12/10/2021 0.5  0.0 - 1.3 K/uL Final    Eosinophils Absolute 12/10/2021 0.0  0.0 - 0.6 K/uL Final    Basophils Absolute 12/10/2021 0.0  0.0 - 0.2 K/uL Final    Sodium 12/10/2021 134* 136 - 145 mmol/L Final    Potassium reflex Magnesium 12/10/2021 3.6  3.5 - 5.1 mmol/L Final    Chloride 12/10/2021 96* 99 - 110 mmol/L Final    CO2 12/10/2021 25  21 - 32 mmol/L Final    Anion Gap 12/10/2021 13  3 - 16 Final    Glucose 12/10/2021 116* 70 - 99 mg/dL Final    BUN 12/10/2021 8  7 - 20 mg/dL Final    CREATININE 12/10/2021 0.8  0.6 - 1.1 mg/dL Final    GFR Non- 12/10/2021 >60  >60 Final    Comment: >60 mL/min/1.73m2 EGFR, calc. for ages 25 and older using the  MDRD formula (not corrected for weight), is valid for stable  renal function.  GFR  12/10/2021 >60  >60 Final    Comment: Chronic Kidney Disease: less than 60 ml/min/1.73 sq.m. Kidney Failure: less than 15 ml/min/1.73 sq.m. Results valid for patients 18 years and older.       Calcium 12/10/2021 10.0  8.3 - 10.6 mg/dL Final    Total Protein 12/10/2021 7.6  6.4 - 8.2 g/dL Final    Albumin 12/10/2021 4.5  3.4 - 5.0 g/dL Final    Albumin/Globulin Ratio 12/10/2021 1.5  1.1 - 2.2 Final    Total Bilirubin 12/10/2021 0.9  0.0 - 1.0 mg/dL Final    Alkaline Phosphatase 12/10/2021 145* 40 - 129 U/L Final    ALT 12/10/2021 36  10 - 40 U/L Final    AST 12/10/2021 35  15 - 37 U/L Final    Color, UA 12/10/2021 YELLOW  Straw/Yellow Final    Clarity, UA 12/10/2021 CLOUDY* Clear Final    Glucose, Ur 12/10/2021 Negative  Negative mg/dL Final    Bilirubin Urine 12/10/2021 Negative  Negative Final    Ketones, Urine 12/10/2021 Negative  Negative mg/dL Final    Specific Gravity, UA 12/10/2021 1.009  1.005 - 1.030 Final    Blood, Urine 12/10/2021 Negative  Negative Final    pH, UA 12/10/2021 7.5  5.0 - 8.0 Final    Protein, UA 12/10/2021 Negative  Negative mg/dL Final    Urobilinogen, Urine 12/10/2021 0.2  <2.0 E.U./dL Final    Nitrite, Urine 12/10/2021 Negative  Negative Final    Leukocyte Esterase, Urine 12/10/2021 Negative  Negative Final    Microscopic Examination 12/10/2021 YES   Final    Urine Type 12/10/2021 NotGiven   Final    Urine received in a container without preservatives.  Urine Reflex to Culture 12/10/2021 Yes   Final    Amphetamine Screen, Urine 12/10/2021 Neg  Negative <1000ng/mL Final    Barbiturate Screen, Ur 12/10/2021 Neg  Negative <200 ng/mL Final    Benzodiazepine Screen, Urine 12/10/2021 Neg  Negative <200 ng/mL Final    Cannabinoid Scrn, Ur 12/10/2021 POSITIVE* Negative <50 ng/mL Final    Cocaine Metabolite Screen, Urine 12/10/2021 Neg  Negative <300 ng/mL Final    Opiate Scrn, Ur 12/10/2021 Neg  Negative <300 ng/mL Final    Comment: \"Therapeutic levels of pain medication, especially oxycontin and synthetic  opioids, may not be detected by this Methodology. Pain management screen  panel  Drug panel-PM-Hi Res Ur, Interp (PAIN) should be considered for drug  monitoring \".  PCP Screen, Urine 12/10/2021 Neg  Negative <25 ng/mL Final    Methadone Screen, Urine 12/10/2021 Neg  Negative <300 ng/mL Final    Propoxyphene Scrn, Ur 12/10/2021 Neg  Negative <300 ng/mL Final    Oxycodone Urine 12/10/2021 Neg  Negative <100 ng/ml Final    pH, UA 12/10/2021 7.5   Final    Comment: Urine pH less than 5.0 or greater than 8.0 may indicate sample adulteration. Another sample should be collected if clinically  indicated.  Drug Screen Comment: 12/10/2021 see below   Final    Comment: This method is a screening test to detect only these drug  classes as part of a medical workup.   Confirmatory testing  by another method should be ordered if clinically indicated.  Acetaminophen Level 12/10/2021 <5* 10 - 30 ug/mL Final    Comment: Therapeutic Range: 10.0-30.0 ug/mL  Toxic: >=377 ug/mL      Salicylate, Serum 99/69/2213 <0.3* 15.0 - 30.0 mg/dL Final    Comment: Therapeutic Range: 15.0-30.0 mg/dL  Toxic: >30.0 mg/dL      Ethanol Lvl 12/10/2021 None Detected  mg/dL Final    Comment:    None Detected  Conversion factor:  100 mg/dl = .100 g/dl  For Medical Purposes Only      Bacteria, UA 12/10/2021 1+* None Seen /HPF Final    Hyaline Casts, UA 12/10/2021 0  0 - 8 /LPF Final    WBC, UA 12/10/2021 12* 0 - 5 /HPF Final    RBC, UA 12/10/2021 1  0 - 4 /HPF Final    Epithelial Cells, UA 12/10/2021 21* 0 - 5 /HPF Final    Comment: Urinalysis microscopic performed using the  automated methodology (AUWI analyzer).  Urine Culture, Routine 12/10/2021 No growth at 18 to 36 hours   Final    SARS-CoV-2, NAAT 12/10/2021 Not Detected  Not Detected Final    Comment: Rapid NAAT:   Negative results should be treated as presumptive and,  if inconsistent with clinical signs and symptoms or necessary for  patient management, should be tested with an alternative molecular  assay. Negative results do not preclude SARS-CoV-2 infection and  should not be used as the sole basis for patient management decisions. This test has been authorized by the FDA under an Emergency Use  Authorization (EUA) for use by authorized laboratories.     Fact sheet for Healthcare Providers:  Maddy.es  Fact sheet for Patients: BuildHer.es    METHODOLOGY: Isothermal Nucleic Acid Amplification              Medications  Current Facility-Administered Medications: QUEtiapine (SEROQUEL) tablet 25 mg, 25 mg, Oral, Nightly  sennosides-docusate sodium (SENOKOT-S) 8.6-50 MG tablet 2 tablet, 2 tablet, Oral, Daily  lamoTRIgine (LAMICTAL) tablet 50 mg, 50 mg, Oral, Daily  nicotine (NICODERM CQ) 21 MG/24HR 1 patch, 1 patch, TransDERmal, Daily  nicotine polacrilex (COMMIT) lozenge 2 mg, 2 mg, Oral, Q1H PRN  acetaminophen (TYLENOL) tablet 650 mg, 650 mg, Oral, Q4H PRN  ibuprofen (ADVIL;MOTRIN) tablet 400 mg, 400 mg, Oral, Q6H PRN  magnesium hydroxide (MILK OF MAGNESIA) 400 MG/5ML suspension 30 mL, 30 mL, Oral, Daily PRN  aluminum & magnesium hydroxide-simethicone (MAALOX) 200-200-20 MG/5ML suspension 30 mL, 30 mL, Oral, Q6H PRN  OLANZapine (ZYPREXA) tablet 10 mg, 10 mg, Oral, Q8H PRN **OR** OLANZapine (ZYPREXA) injection 10 mg, 10 mg, IntraMUSCular, Q8H PRN  sterile water injection 2.1 mL, 2.1 mL, IntraMUSCular, Q8H PRN  benztropine mesylate (COGENTIN) injection 2 mg, 2 mg, IntraMUSCular, BID PRN  cloNIDine (CATAPRES) tablet 0.1 mg, 0.1 mg, Oral, BID  amLODIPine (NORVASC) tablet 2.5 mg, 2.5 mg, Oral, Daily  DULoxetine (CYMBALTA) extended release capsule 90 mg, 90 mg, Oral, Daily    ASSESSMENT AND PLAN    Principal Problem:    Borderline personality disorder (HCC)  Active Problems:    Primary hypertension    Alcohol use disorder, moderate, in controlled environment [facility]    Cannabis abuse    Cocaine use disorder, moderate, in early remission ([facility])    Major depressive disorder, recurrent severe without psychotic features [facility]    Tobacco dependence    GERD without esophagitis  Resolved Problems:    * No resolved hospital problems. *       1. Patient s symptoms   are improving  2. Probable discharge is 1-2 d  3. Discharge planning is complete  4. Suicidal ideation is none  5. Total time with patient was 40 minutes and more than 50 % of that time was spent counseling the patient on their symptoms, treatment and expected goals.

## 2021-12-14 NOTE — GROUP NOTE
Group Therapy Note    Date: 12/14/2021    Group Start Time: 1000  Group End Time: 0727  Group Topic: Cognitive Skills    [id number] [facility]        Group Therapy Note    Attendees: Group members were taught the importance of Coping skills. Group members completed a coping skills chart and shared when finished. Notes:  La attended group for majority of the time due to meeting with the doctor. Boogie Holder completed her activity and interacted appropriately with her peers. Status After Intervention:  Improved    Participation Level:  Active Listener and Interactive    Participation Quality: Appropriate, Attentive and Sharing      Speech:  normal      Thought Process/Content: Logical      Affective Functioning: Congruent      Mood: Engaging      Level of consciousness:  Alert, Oriented x4 and Attentive      Response to Learning: Able to verbalize current knowledge/experience      Endings: None Reported    Modes of Intervention: Education, Exploration and Activity      Discipline Responsible: Behavorial Health Tech      Signature:  RADHA Marrero

## 2021-12-15 PROCEDURE — 99233 SBSQ HOSP IP/OBS HIGH 50: CPT | Performed by: PSYCHIATRY & NEUROLOGY

## 2021-12-15 PROCEDURE — 6370000000 HC RX 637 (ALT 250 FOR IP): Performed by: PSYCHIATRY & NEUROLOGY

## 2021-12-15 PROCEDURE — 1240000000 HC EMOTIONAL WELLNESS R&B

## 2021-12-15 RX ORDER — GUAIFENESIN 600 MG/1
600 TABLET, EXTENDED RELEASE ORAL 2 TIMES DAILY
Status: DISCONTINUED | OUTPATIENT
Start: 2021-12-15 | End: 2021-12-20 | Stop reason: HOSPADM

## 2021-12-15 RX ADMIN — CLONIDINE HYDROCHLORIDE 0.1 MG: 0.1 TABLET ORAL at 09:02

## 2021-12-15 RX ADMIN — LAMOTRIGINE 50 MG: 25 TABLET ORAL at 09:02

## 2021-12-15 RX ADMIN — AMLODIPINE BESYLATE 2.5 MG: 2.5 TABLET ORAL at 09:02

## 2021-12-15 RX ADMIN — CLONIDINE HYDROCHLORIDE 0.1 MG: 0.1 TABLET ORAL at 22:05

## 2021-12-15 RX ADMIN — GUAIFENESIN 600 MG: 600 TABLET, EXTENDED RELEASE ORAL at 22:05

## 2021-12-15 RX ADMIN — GUAIFENESIN 600 MG: 600 TABLET, EXTENDED RELEASE ORAL at 12:57

## 2021-12-15 RX ADMIN — DULOXETINE HYDROCHLORIDE 90 MG: 60 CAPSULE, DELAYED RELEASE ORAL at 09:02

## 2021-12-15 RX ADMIN — QUETIAPINE FUMARATE 50 MG: 25 TABLET ORAL at 22:05

## 2021-12-15 ASSESSMENT — PAIN SCALES - GENERAL: PAINLEVEL_OUTOF10: 0

## 2021-12-15 NOTE — GROUP NOTE
Group Therapy Note    Date: 12/15/2021    Group Start Time: 1100  Group End Time: 1200  Group Topic: Psychoeducation    [address] [place] [address]        Group Therapy Note    Mode of Intervention: Creative Expression    Intervention Set: 3 Line Drawing - Something From Nothing    Group members engaged in creative intervention, drawing three lines on three pieces of paper, distributing papers to peers. Peers were instructed to create an image incorporating the three lines. Group members then processed recovery, creation and \"making something when it feels like nothing. \"    Attendees: 5         Notes:  Pt present and actively engaged across creative intervention and discussion. Status After Intervention:  Improved    Participation Level:  Active Listener and Interactive    Participation Quality: Appropriate, Sharing and Supportive      Speech:  normal      Thought Process/Content: Logical      Affective Functioning: Congruent      Mood: euthymic      Level of consciousness:  Alert and Attentive      Response to Learning: Able to verbalize/acknowledge new learning, Capable of insight and Progressing to goal      Endings: None Reported    Modes of Intervention: Socialization, Exploration, Activity and Media      Discipline Responsible: Psychoeducational Specialist      Signature:  Fish Snowden MM, MT-BC

## 2021-12-15 NOTE — PROGRESS NOTES
Department of Psychiatry  AttendingProgress Note  Chief Complaint: depression  Alesia Small is doing well I program. Attending groups and feesl that she is learning coping strategies. She is planning to return to her apt and is worried that she'll be evicted for missing her rent. Michael Diaz is helping her with food stamps and other temporary assistance. Sinus congestion . Will add Mucinex. Slept well last night. But woke up and felt like \" a bitch this morning. \"   Patient's chart was reviewed and collaborated with  about the treatment plan. SUBJECTIVE:    Patient is feeling better. Suicidal ideation:  denies suicidal ideation. Patient does not have medication side effects. ROS: Patient has new complaints: no  Sleeping adequately:  Yes   Appetite adequate: Yes  Attending groups: Yes  Visitors:No    OBJECTIVE    Physical  VITALS:  BP (!) 149/94   Pulse 93   Temp 97.7 °F (36.5 °C) (Temporal)   Resp 16   Ht 5' 4\" (1.626 m)   Wt 150 lb (68 kg)   LMP  (LMP Unknown)   SpO2 98%   BMI 25.75 kg/m²     Mental Status Examination:  Patients appearance was street clothes. Thoughts are Goal directed. Homicidal ideations none. No abnormal movements, tics or mannerisms. Memory intact Aims 0. Concentration Good. Alert and oriented X 4. Insight and Judgement impaired insight. Patient was cooperative. Patient gait normal. Mood depressed, affect depressed affect Hallucinations Absent, suicidal ideations no specific plan to harm self Speech normal volume  Data  Labs:   No visits with results within 2 Day(s) from this visit.    Latest known visit with results is:   Admission on 12/10/2021, Discharged on 12/10/2021   Component Date Value Ref Range Status    WBC 12/10/2021 8.2  4.0 - 11.0 K/uL Final    RBC 12/10/2021 4.52  4.00 - 5.20 M/uL Final    Hemoglobin 12/10/2021 15.7  12.0 - 16.0 g/dL Final    Hematocrit 12/10/2021 45.5  36.0 - 48.0 % Final    MCV 12/10/2021 100.6* 80.0 - 100.0 fL Final    [facility] 12/10/2021 34.7* 26.0 - 34.0 pg Final    MCHC 12/10/2021 34.5  31.0 - 36.0 g/dL Final    RDW 12/10/2021 13.1  12.4 - 15.4 % Final    Platelets 91/87/8022 301  135 - 450 K/uL Final    MPV 12/10/2021 8.4  5.0 - 10.5 fL Final    Neutrophils % 12/10/2021 77.3  % Final    Lymphocytes % 12/10/2021 16.4  % Final    Monocytes % 12/10/2021 5.6  % Final    Eosinophils % 12/10/2021 0.3  % Final    Basophils % 12/10/2021 0.4  % Final    Neutrophils Absolute 12/10/2021 6.3  1.7 - 7.7 K/uL Final    Lymphocytes Absolute 12/10/2021 1.3  1.0 - 5.1 K/uL Final    Monocytes Absolute 12/10/2021 0.5  0.0 - 1.3 K/uL Final    Eosinophils Absolute 12/10/2021 0.0  0.0 - 0.6 K/uL Final    Basophils Absolute 12/10/2021 0.0  0.0 - 0.2 K/uL Final    Sodium 12/10/2021 134* 136 - 145 mmol/L Final    Potassium reflex Magnesium 12/10/2021 3.6  3.5 - 5.1 mmol/L Final    Chloride 12/10/2021 96* 99 - 110 mmol/L Final    CO2 12/10/2021 25  21 - 32 mmol/L Final    Anion Gap 12/10/2021 13  3 - 16 Final    Glucose 12/10/2021 116* 70 - 99 mg/dL Final    BUN 12/10/2021 8  7 - 20 mg/dL Final    CREATININE 12/10/2021 0.8  0.6 - 1.1 mg/dL Final    GFR Non- 12/10/2021 >60  >60 Final    Comment: >60 mL/min/1.73m2 EGFR, calc. for ages 25 and older using the  MDRD formula (not corrected for weight), is valid for stable  renal function.  GFR  12/10/2021 >60  >60 Final    Comment: Chronic Kidney Disease: less than 60 ml/min/1.73 sq.m. Kidney Failure: less than 15 ml/min/1.73 sq.m. Results valid for patients 18 years and older.       Calcium 12/10/2021 10.0  8.3 - 10.6 mg/dL Final    Total Protein 12/10/2021 7.6  6.4 - 8.2 g/dL Final    Albumin 12/10/2021 4.5  3.4 - 5.0 g/dL Final    Albumin/Globulin Ratio 12/10/2021 1.5  1.1 - 2.2 Final    Total Bilirubin 12/10/2021 0.9  0.0 - 1.0 mg/dL Final    Alkaline Phosphatase 12/10/2021 145* 40 - 129 U/L Final    ALT 12/10/2021 36  10 - 40 U/L Final    AST 12/10/2021 35  15 - 37 U/L Final    Color, UA 12/10/2021 YELLOW  Straw/Yellow Final    Clarity, UA 12/10/2021 CLOUDY* Clear Final    Glucose, Ur 12/10/2021 Negative  Negative mg/dL Final    Bilirubin Urine 12/10/2021 Negative  Negative Final    Ketones, Urine 12/10/2021 Negative  Negative mg/dL Final    Specific Gravity, UA 12/10/2021 1.009  1.005 - 1.030 Final    Blood, Urine 12/10/2021 Negative  Negative Final    pH, UA 12/10/2021 7.5  5.0 - 8.0 Final    Protein, UA 12/10/2021 Negative  Negative mg/dL Final    Urobilinogen, Urine 12/10/2021 0.2  <2.0 E.U./dL Final    Nitrite, Urine 12/10/2021 Negative  Negative Final    Leukocyte Esterase, Urine 12/10/2021 Negative  Negative Final    Microscopic Examination 12/10/2021 YES   Final    Urine Type 12/10/2021 NotGiven   Final    Urine received in a container without preservatives.  Urine Reflex to Culture 12/10/2021 Yes   Final    Amphetamine Screen, Urine 12/10/2021 Neg  Negative <1000ng/mL Final    Barbiturate Screen, Ur 12/10/2021 Neg  Negative <200 ng/mL Final    Benzodiazepine Screen, Urine 12/10/2021 Neg  Negative <200 ng/mL Final    Cannabinoid Scrn, Ur 12/10/2021 POSITIVE* Negative <50 ng/mL Final    Cocaine Metabolite Screen, Urine 12/10/2021 Neg  Negative <300 ng/mL Final    Opiate Scrn, Ur 12/10/2021 Neg  Negative <300 ng/mL Final    Comment: \"Therapeutic levels of pain medication, especially oxycontin and synthetic  opioids, may not be detected by this Methodology. Pain management screen  panel  Drug panel-PM-Hi Res Ur, Interp (PAIN) should be considered for drug  monitoring \".       PCP Screen, Urine 12/10/2021 Neg  Negative <25 ng/mL Final    Methadone Screen, Urine 12/10/2021 Neg  Negative <300 ng/mL Final    Propoxyphene Scrn, Ur 12/10/2021 Neg  Negative <300 ng/mL Final    Oxycodone Urine 12/10/2021 Neg  Negative <100 ng/ml Final    pH, UA 12/10/2021 7.5   Final    Comment: Urine pH less than 5.0 or greater than 8.0 may indicate sample adulteration. Another sample should be collected if clinically  indicated.  Drug Screen Comment: 12/10/2021 see below   Final    Comment: This method is a screening test to detect only these drug  classes as part of a medical workup. Confirmatory testing  by another method should be ordered if clinically indicated.  Acetaminophen Level 12/10/2021 <5* 10 - 30 ug/mL Final    Comment: Therapeutic Range: 10.0-30.0 ug/mL  Toxic: >=790 ug/mL      Salicylate, Serum 48/11/8862 <0.3* 15.0 - 30.0 mg/dL Final    Comment: Therapeutic Range: 15.0-30.0 mg/dL  Toxic: >30.0 mg/dL      Ethanol Lvl 12/10/2021 None Detected  mg/dL Final    Comment:    None Detected  Conversion factor:  100 mg/dl = .100 g/dl  For Medical Purposes Only      Bacteria, UA 12/10/2021 1+* None Seen /HPF Final    Hyaline Casts, UA 12/10/2021 0  0 - 8 /LPF Final    WBC, UA 12/10/2021 12* 0 - 5 /HPF Final    RBC, UA 12/10/2021 1  0 - 4 /HPF Final    Epithelial Cells, UA 12/10/2021 21* 0 - 5 /HPF Final    Comment: Urinalysis microscopic performed using the  automated methodology (AUWI analyzer).  Urine Culture, Routine 12/10/2021 No growth at 18 to 36 hours   Final    SARS-CoV-2, NAAT 12/10/2021 Not Detected  Not Detected Final    Comment: Rapid NAAT:   Negative results should be treated as presumptive and,  if inconsistent with clinical signs and symptoms or necessary for  patient management, should be tested with an alternative molecular  assay. Negative results do not preclude SARS-CoV-2 infection and  should not be used as the sole basis for patient management decisions. This test has been authorized by the FDA under an Emergency Use  Authorization (EUA) for use by authorized laboratories.     Fact sheet for Healthcare Providers:  Maddy.[email or web address]  Fact sheet for Patients: Maddy.[email or web address]    METHODOLOGY: Isothermal Nucleic Acid Amplification Medications  Current Facility-Administered Medications: guaiFENesin (MUCINEX) extended release tablet 600 mg, 600 mg, Oral, BID  QUEtiapine (SEROQUEL) tablet 50 mg, 50 mg, Oral, Nightly  sennosides-docusate sodium (SENOKOT-S) 8.6-50 MG tablet 2 tablet, 2 tablet, Oral, Daily  lamoTRIgine (LAMICTAL) tablet 50 mg, 50 mg, Oral, Daily  nicotine (NICODERM CQ) 21 MG/24HR 1 patch, 1 patch, TransDERmal, Daily  nicotine polacrilex (COMMIT) lozenge 2 mg, 2 mg, Oral, Q1H PRN  acetaminophen (TYLENOL) tablet 650 mg, 650 mg, Oral, Q4H PRN  ibuprofen (ADVIL;MOTRIN) tablet 400 mg, 400 mg, Oral, Q6H PRN  magnesium hydroxide (MILK OF MAGNESIA) 400 MG/5ML suspension 30 mL, 30 mL, Oral, Daily PRN  aluminum & magnesium hydroxide-simethicone (MAALOX) 200-200-20 MG/5ML suspension 30 mL, 30 mL, Oral, Q6H PRN  OLANZapine (ZYPREXA) tablet 10 mg, 10 mg, Oral, Q8H PRN **OR** OLANZapine (ZYPREXA) injection 10 mg, 10 mg, IntraMUSCular, Q8H PRN  sterile water injection 2.1 mL, 2.1 mL, IntraMUSCular, Q8H PRN  benztropine mesylate (COGENTIN) injection 2 mg, 2 mg, IntraMUSCular, BID PRN  cloNIDine (CATAPRES) tablet 0.1 mg, 0.1 mg, Oral, BID  amLODIPine (NORVASC) tablet 2.5 mg, 2.5 mg, Oral, Daily  DULoxetine (CYMBALTA) extended release capsule 90 mg, 90 mg, Oral, Daily    ASSESSMENT AND PLAN    Principal Problem:    Borderline personality disorder (HCC)  Active Problems:    Primary hypertension    Alcohol use disorder, moderate, in controlled environment (HCC)    Cannabis abuse    Cocaine use disorder, moderate, in early remission (HCC)    Major depressive disorder, recurrent severe without psychotic features ([facility] [address]    Tobacco dependence    GERD without esophagitis  Resolved Problems:    * No resolved hospital problems. *       1. Patient s symptoms   are improving  2. Probable discharge is 1-2 d  3. Discharge planning is complete  4. Suicidal ideation is none  5.  Total time with patient was 40 minutes and more than 50 % of that time was spent counseling the patient on their symptoms, treatment and expected goals.

## 2021-12-15 NOTE — BH NOTE
Group Therapy Note    Date: 12/14/2021  Start Time: 20:00  End Time:  21:00  Number of Participants: 8    Type of Group: Recreational  Wrap up    Jose Angel Bahena Information  Module Name:  /  Session Number:  /    Patient's Goal:  Out of room more    Notes:  Goal completed     Status After Intervention:  Unchanged    Participation Level:  Active Listener and Interactive    Participation Quality: Appropriate, Attentive and Sharing      Speech:  pressured      Thought Process/Content: Logical      Affective Functioning: Blunted      Mood: anxious      Level of consciousness:  Alert, Oriented x4 and Attentive      Response to Learning: Capable of insight and Able to change behavior      Endings: None Reported    Modes of Intervention: Socialization and Problem-solving      Discipline Responsible: [address]      Signature:  Wilfrid Lemus

## 2021-12-15 NOTE — GROUP NOTE
Group Therapy Note    Date: 12/15/2021    Group Start Time: 5038  Group End Time: 9920  Group Topic: Healthy Living/Wellness    [address] [place], RN        Group Therapy Note    Patients sandro or wrote a list of their stressors and ways of dealing with those stressors on a large piece of art paper. Patient's Goal:  List stressors and coping skills    Notes:      Status After Intervention:  Improved    Participation Level:  Active Listener    Participation Quality: Appropriate and Supportive      Speech:  normal      Thought Process/Content: Logical  Linear      Affective Functioning: Congruent      Mood: euthymic      Level of consciousness:  Alert and Oriented x4      Response to Learning: Able to verbalize current knowledge/experience and Able to verbalize/acknowledge new learning      Endings: None Reported    Modes of Intervention: Education, Support and Problem-solving      Discipline Responsible: Registered Nurse      Signature:  Cee Walls RN

## 2021-12-15 NOTE — PLAN OF CARE
Patient was out with patient group, early in the shift & was social. Patient reported having an \"ok\" day. \"Other patient's were fighting. \" Patient reported feeling 40% improved since admission. \"I don't feel like my head is lyric total fog. I can have a conversation, with you. \" Patient complained of a sore throat. Patient was afebrile. \"I think it's caused by the dry heat, here. \" Patient with noted, with no attempts to harm self.  John Dominguez R.N.

## 2021-12-15 NOTE — BH NOTE
BHI Shift Note     Shift: Day    Meals: Day  Sleep: Reports sleep as \"okay\"    Is the Patient Nabil for Safety: Yes If no, what intervention?: n/a    Thought Process: Circumstantial  Thought Content: Goal oriented. Hallucinations: None Explain: n/a    Delusions: None noted. Explain: N/a    Groups Attended: Pt encouraged to attend unit programming. She attended community group. Medication Compliant: Yes. PRNs Given: None. Narrative: Malachi Payne was cooperative and anxious, pt was alert and oriented X4. She stated she was \"bitchy\" this morning and didn't know the reason why, however, she was pleasant and calm during initial assessment. She denies AVH and denies SI/HI. No other questions or concerns at this time, will continue to monitor.

## 2021-12-15 NOTE — GROUP NOTE
Group Therapy Note    Date: 12/15/2021    Group Start Time: 1000  Group End Time: 1050  Group Topic: Recreational    [id number] [facility] [employer]        Group Therapy Note    Attendees: Group members worked together and engaged in games of [address] [employer]. Notes:  La attended group for the full duration. Christiano Barragan remained engaged and interacted appropriately with her peers. Status After Intervention:  Improved    Participation Level:  Active Listener and Interactive    Participation Quality: Appropriate, Attentive and Sharing      Speech:  normal      Thought Process/Content: Logical      Affective Functioning: Congruent      Mood: Brightened, Engaging     Level of consciousness:  Alert, Oriented x4 and Attentive      Response to Learning: Able to verbalize current knowledge/experience      Endings: Suicidality    Modes of Intervention: Exploration      Discipline Responsible: Behavorial Health Tech      Signature:  RADHA Moya

## 2021-12-16 PROCEDURE — 6370000000 HC RX 637 (ALT 250 FOR IP)

## 2021-12-16 PROCEDURE — 99233 SBSQ HOSP IP/OBS HIGH 50: CPT | Performed by: PSYCHIATRY & NEUROLOGY

## 2021-12-16 PROCEDURE — 6370000000 HC RX 637 (ALT 250 FOR IP): Performed by: PSYCHIATRY & NEUROLOGY

## 2021-12-16 PROCEDURE — 1240000000 HC EMOTIONAL WELLNESS R&B

## 2021-12-16 RX ADMIN — STANDARDIZED SENNA CONCENTRATE AND DOCUSATE SODIUM 1 TABLET: 8.6; 5 TABLET ORAL at 07:53

## 2021-12-16 RX ADMIN — LAMOTRIGINE 50 MG: 25 TABLET ORAL at 09:11

## 2021-12-16 RX ADMIN — DULOXETINE HYDROCHLORIDE 90 MG: 60 CAPSULE, DELAYED RELEASE ORAL at 07:55

## 2021-12-16 RX ADMIN — GUAIFENESIN 600 MG: 600 TABLET, EXTENDED RELEASE ORAL at 21:45

## 2021-12-16 RX ADMIN — GUAIFENESIN 600 MG: 600 TABLET, EXTENDED RELEASE ORAL at 09:11

## 2021-12-16 RX ADMIN — ACETAMINOPHEN 650 MG: 325 TABLET ORAL at 07:54

## 2021-12-16 RX ADMIN — CLONIDINE HYDROCHLORIDE 0.1 MG: 0.1 TABLET ORAL at 21:45

## 2021-12-16 RX ADMIN — QUETIAPINE FUMARATE 50 MG: 25 TABLET ORAL at 21:45

## 2021-12-16 RX ADMIN — AMLODIPINE BESYLATE 2.5 MG: 2.5 TABLET ORAL at 07:55

## 2021-12-16 RX ADMIN — CLONIDINE HYDROCHLORIDE 0.1 MG: 0.1 TABLET ORAL at 09:11

## 2021-12-16 RX ADMIN — ACETAMINOPHEN 650 MG: 325 TABLET ORAL at 19:33

## 2021-12-16 ASSESSMENT — PAIN SCALES - GENERAL
PAINLEVEL_OUTOF10: 5
PAINLEVEL_OUTOF10: 0
PAINLEVEL_OUTOF10: 0
PAINLEVEL_OUTOF10: 5

## 2021-12-16 NOTE — PROGRESS NOTES
Department of Psychiatry  AttendingProgress Note  Chief Complaint: depression  Lorette Hashimoto has made good strides with regard to her depression and developing insight and coping strategies. She feels that she is able to think about her life and the poor decisions that she has made. She stated that she feels for the first time , that she was willing to make changes rather than focus on dc from the hospital.  Patient's chart was reviewed and collaborated with  about the treatment plan. SUBJECTIVE:    Patient is feeling better. Suicidal ideation:  denies suicidal ideation. Patient does not have medication side effects. ROS: Patient has new complaints: no  Sleeping adequately:  Yes   Appetite adequate: Yes  Attending groups: Yes  Visitors:No    OBJECTIVE    Physical  VITALS:  /78   Pulse 71   Temp 97.5 °F (36.4 °C) (Temporal)   Resp 16   Ht 5' 4\" (1.626 m)   Wt 150 lb (68 kg)   LMP  (LMP Unknown)   SpO2 98%   BMI 25.75 kg/m²     Mental Status Examination:  Patients appearance was street clothes. Thoughts are Goal directed. Homicidal ideations none. No abnormal movements, tics or mannerisms. Memory intact Aims 0. Concentration Good. Alert and oriented X 4. Insight and Judgement impaired insight. Patient was cooperative. Patient gait normal. Mood depressed, affect normal affect Hallucinations Absent, suicidal ideations no specific plan to harm self Speech normal volume  Data  Labs:   No visits with results within 2 Day(s) from this visit.    Latest known visit with results is:   Admission on 12/10/2021, Discharged on 12/10/2021   Component Date Value Ref Range Status    WBC 12/10/2021 8.2  4.0 - 11.0 K/uL Final    RBC 12/10/2021 4.52  4.00 - 5.20 M/uL Final    Hemoglobin 12/10/2021 15.7  12.0 - 16.0 g/dL Final    Hematocrit 12/10/2021 45.5  36.0 - 48.0 % Final    MCV 12/10/2021 100.6* 80.0 - 100.0 fL Final    MCH 12/10/2021 34.7* 26.0 - 34.0 pg Final    MCHC 12/10/2021 34.5  31.0 - 36.0 g/dL Final    RDW 12/10/2021 13.1  12.4 - 15.4 % Final    Platelets 20/35/9360 301  135 - 450 K/uL Final    MPV 12/10/2021 8.4  5.0 - 10.5 fL Final    Neutrophils % 12/10/2021 77.3  % Final    Lymphocytes % 12/10/2021 16.4  % Final    Monocytes % 12/10/2021 5.6  % Final    Eosinophils % 12/10/2021 0.3  % Final    Basophils % 12/10/2021 0.4  % Final    Neutrophils Absolute 12/10/2021 6.3  1.7 - 7.7 K/uL Final    Lymphocytes Absolute 12/10/2021 1.3  1.0 - 5.1 K/uL Final    Monocytes Absolute 12/10/2021 0.5  0.0 - 1.3 K/uL Final    Eosinophils Absolute 12/10/2021 0.0  0.0 - 0.6 K/uL Final    Basophils Absolute 12/10/2021 0.0  0.0 - 0.2 K/uL Final    Sodium 12/10/2021 134* 136 - 145 mmol/L Final    Potassium reflex Magnesium 12/10/2021 3.6  3.5 - 5.1 mmol/L Final    Chloride 12/10/2021 96* 99 - 110 mmol/L Final    CO2 12/10/2021 25  21 - 32 mmol/L Final    Anion Gap 12/10/2021 13  3 - 16 Final    Glucose 12/10/2021 116* 70 - 99 mg/dL Final    BUN 12/10/2021 8  7 - 20 mg/dL Final    CREATININE 12/10/2021 0.8  0.6 - 1.1 mg/dL Final    GFR Non- 12/10/2021 >60  >60 Final    Comment: >60 mL/min/1.73m2 EGFR, calc. for ages 25 and older using the  MDRD formula (not corrected for weight), is valid for stable  renal function.  GFR  12/10/2021 >60  >60 Final    Comment: Chronic Kidney Disease: less than 60 ml/min/1.73 sq.m. Kidney Failure: less than 15 ml/min/1.73 sq.m. Results valid for patients 18 years and older.       Calcium 12/10/2021 10.0  8.3 - 10.6 mg/dL Final    Total Protein 12/10/2021 7.6  6.4 - 8.2 g/dL Final    Albumin 12/10/2021 4.5  3.4 - 5.0 g/dL Final    Albumin/Globulin Ratio 12/10/2021 1.5  1.1 - 2.2 Final    Total Bilirubin 12/10/2021 0.9  0.0 - 1.0 mg/dL Final    Alkaline Phosphatase 12/10/2021 145* 40 - 129 U/L Final    ALT 12/10/2021 36  10 - 40 U/L Final    AST 12/10/2021 35  15 - 37 U/L Final    Color, UA 12/10/2021 YELLOW Straw/Yellow Final    Clarity, UA 12/10/2021 CLOUDY* Clear Final    Glucose, Ur 12/10/2021 Negative  Negative mg/dL Final    Bilirubin Urine 12/10/2021 Negative  Negative Final    Ketones, Urine 12/10/2021 Negative  Negative mg/dL Final    Specific Gravity, UA 12/10/2021 1.009  1.005 - 1.030 Final    Blood, Urine 12/10/2021 Negative  Negative Final    pH, UA 12/10/2021 7.5  5.0 - 8.0 Final    Protein, UA 12/10/2021 Negative  Negative mg/dL Final    Urobilinogen, Urine 12/10/2021 0.2  <2.0 E.U./dL Final    Nitrite, Urine 12/10/2021 Negative  Negative Final    Leukocyte Esterase, Urine 12/10/2021 Negative  Negative Final    Microscopic Examination 12/10/2021 YES   Final    Urine Type 12/10/2021 NotGiven   Final    Urine received in a container without preservatives.  Urine Reflex to Culture 12/10/2021 Yes   Final    Amphetamine Screen, Urine 12/10/2021 Neg  Negative <1000ng/mL Final    Barbiturate Screen, Ur 12/10/2021 Neg  Negative <200 ng/mL Final    Benzodiazepine Screen, Urine 12/10/2021 Neg  Negative <200 ng/mL Final    Cannabinoid Scrn, Ur 12/10/2021 POSITIVE* Negative <50 ng/mL Final    Cocaine Metabolite Screen, Urine 12/10/2021 Neg  Negative <300 ng/mL Final    Opiate Scrn, Ur 12/10/2021 Neg  Negative <300 ng/mL Final    Comment: \"Therapeutic levels of pain medication, especially oxycontin and synthetic  opioids, may not be detected by this Methodology. Pain management screen  panel  Drug panel-PM-Hi Res Ur, Interp (PAIN) should be considered for drug  monitoring \".  PCP Screen, Urine 12/10/2021 Neg  Negative <25 ng/mL Final    Methadone Screen, Urine 12/10/2021 Neg  Negative <300 ng/mL Final    Propoxyphene Scrn, Ur 12/10/2021 Neg  Negative <300 ng/mL Final    Oxycodone Urine 12/10/2021 Neg  Negative <100 ng/ml Final    pH, UA 12/10/2021 7.5   Final    Comment: Urine pH less than 5.0 or greater than 8.0 may indicate sample adulteration.   Another sample should be collected if clinically  indicated.  Drug Screen Comment: 12/10/2021 see below   Final    Comment: This method is a screening test to detect only these drug  classes as part of a medical workup. Confirmatory testing  by another method should be ordered if clinically indicated.  Acetaminophen Level 12/10/2021 <5* 10 - 30 ug/mL Final    Comment: Therapeutic Range: 10.0-30.0 ug/mL  Toxic: >=036 ug/mL      Salicylate, Serum 83/38/0478 <0.3* 15.0 - 30.0 mg/dL Final    Comment: Therapeutic Range: 15.0-30.0 mg/dL  Toxic: >30.0 mg/dL      Ethanol Lvl 12/10/2021 None Detected  mg/dL Final    Comment:    None Detected  Conversion factor:  100 mg/dl = .100 g/dl  For Medical Purposes Only      Bacteria, UA 12/10/2021 1+* None Seen /HPF Final    Hyaline Casts, UA 12/10/2021 0  0 - 8 /LPF Final    WBC, UA 12/10/2021 12* 0 - 5 /HPF Final    RBC, UA 12/10/2021 1  0 - 4 /HPF Final    Epithelial Cells, UA 12/10/2021 21* 0 - 5 /HPF Final    Comment: Urinalysis microscopic performed using the  automated methodology (AUWI analyzer).  Urine Culture, Routine 12/10/2021 No growth at 18 to 36 hours   Final    SARS-CoV-2, NAAT 12/10/2021 Not Detected  Not Detected Final    Comment: Rapid NAAT:   Negative results should be treated as presumptive and,  if inconsistent with clinical signs and symptoms or necessary for  patient management, should be tested with an alternative molecular  assay. Negative results do not preclude SARS-CoV-2 infection and  should not be used as the sole basis for patient management decisions. This test has been authorized by the FDA under an Emergency Use  Authorization (EUA) for use by authorized laboratories.     Fact sheet for Healthcare Providers:  Maddy.es  Fact sheet for Patients: Maddy.es    METHODOLOGY: Isothermal Nucleic Acid Amplification              Medications  Current Facility-Administered Medications: guaiFENesin ([place] [address]) extended release tablet 600 mg, 600 mg, Oral, BID  QUEtiapine (SEROQUEL) tablet 50 mg, 50 mg, Oral, Nightly  sennosides-docusate sodium (SENOKOT-S) 8.6-50 MG tablet 2 tablet, 2 tablet, Oral, Daily  lamoTRIgine (LAMICTAL) tablet 50 mg, 50 mg, Oral, Daily  nicotine (NICODERM CQ) 21 MG/24HR 1 patch, 1 patch, TransDERmal, Daily  nicotine polacrilex (COMMIT) lozenge 2 mg, 2 mg, Oral, Q1H PRN  acetaminophen (TYLENOL) tablet 650 mg, 650 mg, Oral, Q4H PRN  ibuprofen (ADVIL;MOTRIN) tablet 400 mg, 400 mg, Oral, Q6H PRN  magnesium hydroxide (MILK OF MAGNESIA) 400 MG/5ML suspension 30 mL, 30 mL, Oral, Daily PRN  aluminum & magnesium hydroxide-simethicone (MAALOX) 200-200-20 MG/5ML suspension 30 mL, 30 mL, Oral, Q6H PRN  OLANZapine (ZYPREXA) tablet 10 mg, 10 mg, Oral, Q8H PRN **OR** OLANZapine (ZYPREXA) injection 10 mg, 10 mg, IntraMUSCular, Q8H PRN  sterile water injection 2.1 mL, 2.1 mL, IntraMUSCular, Q8H PRN  benztropine mesylate (COGENTIN) injection 2 mg, 2 mg, IntraMUSCular, BID PRN  cloNIDine (CATAPRES) tablet 0.1 mg, 0.1 mg, Oral, BID  amLODIPine (NORVASC) tablet 2.5 mg, 2.5 mg, Oral, Daily  DULoxetine (CYMBALTA) extended release capsule 90 mg, 90 mg, Oral, Daily    ASSESSMENT AND PLAN    Principal Problem:    Borderline personality disorder (HCC)  Active Problems:    Primary hypertension    Alcohol use disorder, moderate, in controlled environment (HCC)    Cannabis abuse    Cocaine use disorder, moderate, in early remission (HCC)    Major depressive disorder, recurrent severe without psychotic features ([facility] [address]    Tobacco dependence    GERD without esophagitis  Resolved Problems:    * No resolved hospital problems. *       1. Patient s symptoms   are improving  2. Probable discharge is 1-2 d  3. Discharge planning is complete  4. Suicidal ideation is none  5. Total time with patient was 40 minutes and more than 50 % of that time was spent counseling the patient on their symptoms, treatment and expected goals.

## 2021-12-16 NOTE — FLOWSHEET NOTE
Pt participated actively in group. She made some selections of songs that she and the entire group enjoyed listening and singing. Pt shared some life stories about some of the songs she picked. She said, \"I enjoyed the group today. I feel much happier. \"

## 2021-12-16 NOTE — GROUP NOTE
Group Therapy Note    Date: 12/15/2021    Group Start Time: 2000  Group End Time: 2025  Group Topic: Wrap-Up    [address] [facility]        Group Therapy Note    Attendees: Goals and importance of goal setting discussed. Night time milieu activities discussed. Patient's Goal:  \"To stop being bitchy\"    Notes:  Successful     Status After Intervention:  Improved    Participation Level:  Active Listener and Interactive    Participation Quality: Appropriate, Attentive and Sharing      Speech:  normal      Thought Process/Content: Logical  Linear      Affective Functioning: Congruent      Mood: euthymic      Level of consciousness:  Alert and Oriented x4      Response to Learning: Progressing to goal      Endings: None Reported    Modes of Intervention: Support      Discipline Responsible: Behavorial Health Tech      Signature:  Mel Silver

## 2021-12-16 NOTE — PROGRESS NOTES
..Purposeful Rounding    Patient Location: Patient room    Patient willing to engage in conversation: No    Presentation/behavior: sleeping    Affect: sleeping    Concerns reported: no    PRN medications given: no    Environmental assessment: Room free from clutter, Clear path to bathroom  and Adequate lighting    Fall prevention interventions in place: Lighting appropriate, Room free of clutter and Clear path to bathroom    Daily Jaiden Fall Risk Score: 73    Daily Blevins Fall Risk Score: low      Electronically signed by Pilar Hendricks RN on 12/16/21 at 2:13 AM EST

## 2021-12-16 NOTE — PROGRESS NOTES
...Purposeful Rounding    Patient Location: Day room    Patient willing to engage in conversation: Yes    Presentation/behavior: Anxious, Cooperative and Pleasant    Affect: Brightens with interaction and Anxious    Concerns reported: no    PRN medications given: no    Environmental assessment: Room free from clutter, Clear path to bathroom  and Adequate lighting    Fall prevention interventions in place: Lighting appropriate, Room free of clutter and Clear path to bathroom    Daily Chambers Fall Risk Score: 73    Daily Blevins Fall Risk Score: low      Electronically signed by Kailash Perez RN on 12/15/21 at 9:25 PM EST

## 2021-12-16 NOTE — CARE COORDINATION
[facility]  Treatment Team Note  Review Date & Time: 12/16/21  0930    Patient was not in treatment team      Status EXAM:   Status and Exam  Normal: No  Facial Expression: Brightened  Affect: Congruent  Level of Consciousness: Alert  Mood:Normal: No  Mood: Depressed, Anxious  Motor Activity:Normal: Yes  Interview Behavior: Cooperative  Preception: [place] to Person, [place] to Time, [place] to Place, [place] to Situation  Attention:Normal: No  Attention: Distractible  Thought Processes: Circumstantial  Thought Content:Normal: Yes  Thought Content:  (WNL)  Hallucinations: None, Other (Comment) (ppatient denied)  Delusions: No  Memory:Normal: Yes  Insight and Judgment: No  Insight and Judgment: Poor Judgment, Poor Insight  Present Suicidal Ideation: No  Present Homicidal Ideation: No      Suicide Risk CSSR-S:  1) Within the past month, have you wished you were dead or wished you could go to sleep and not wake up? : Yes  2) Have you actually had any thoughts of killing yourself? : Yes  3) Have you been thinking about how you might kill yourself? : Yes  5) Have you started to work out or worked out the details of how to kill yourself? Do you intend to carry out this plan? : Yes  6) Have you ever done anything, started to do anything, or prepared to do anything to end your life?: Yes      PLAN/TREATMENT RECOMMENDATIONS UPDATE: Patient will take medication as prescribed, eat 75% of meals, attend groups, participate in milieu activities, participate in treatment team and care planning for discharge and follow up.           Sony Miranda RN

## 2021-12-16 NOTE — PROGRESS NOTES
Purposeful Rounding     Patient Location: Patient room     Patient willing to engage in conversation: No     Presentation/behavior: sleeping     Affect: sleeping     Concerns reported: no     PRN medications given: no     Environmental assessment: Room free from clutter, Clear path to bathroom  and Adequate lighting     Fall prevention interventions in place: Lighting appropriate, Room free of clutter and Clear path to bathroom     Daily [place] Fall Risk Score: 73     Daily Blevins Fall Risk Score: low

## 2021-12-16 NOTE — PLAN OF CARE
Problem: Altered Mood, Depressive Behavior:  Goal: Able to verbalize acceptance of life and situations over which he or she has no control  Description: Able to verbalize acceptance of life and situations over which he or she has no control  Outcome: Ongoing  Goal: Able to verbalize and/or display a decrease in depressive symptoms  Description: Able to verbalize and/or display a decrease in depressive symptoms  Outcome: Ongoing  Goal: Ability to disclose and discuss suicidal ideas will improve  Description: Ability to disclose and discuss suicidal ideas will improve  Outcome: Ongoing  Goal: Able to verbalize support systems  Description: Able to verbalize support systems  Outcome: Ongoing  Goal: Absence of self-harm  Description: Absence of self-harm  Outcome: Ongoing  Goal: Participates in care planning  Description: Participates in care planning  Outcome: Ongoing   La is in dayroom interacting with peers, attended groups, ate hs snack. Medication compliant. No complaints of pain. She denied SI/HI,A/V hallucinations. She denied any needs. She is asked to come to the staff if thoughts of self harm were to occur. She agrees to do this. Safety checks continue Q 15 minutes through out the shift.

## 2021-12-16 NOTE — GROUP NOTE
Group Therapy Note    Date: 12/16/2021    Group Start Time: 1000  Group End Time: 0189  Group Topic: Psychoeducation    [address] [facility], [facility]        Group Therapy Note    Topic: Stress, Anxiety, & The Body    Group members processed physiological responses to stress/anxiety, explored aspects of fight/flight/freeze response, and related topic to personally-experienced symptoms of stress/anxiety. Attendees: 4         Patient's Goal:  Pt will identify symptoms of their body's response to stress and anxiety, will set intention to validate body's responses to stress as protective instincts. Notes:  Lucila Rodriguez was present across group session, actively participating in group discussions of anxiety and stress symptoms. Lucila Rodriguez provided multiple examples of personally-experienced symptoms, expressing relating to idea of \"running from a tiger through the woods\", validating that her fight/flight/freeze response feels \"problematic and dangerous\", but accepted feedback from peers that symptoms should be acknowledged and not fought. Status After Intervention:  Improved    Participation Level:  Active Listener and Interactive    Participation Quality: Appropriate and Sharing      Speech:  normal      Thought Process/Content: Logical      Affective Functioning: Congruent      Mood: anxious      Level of consciousness:  Alert and Attentive      Response to Learning: Capable of insight and Progressing to goal      Endings: None Reported    Modes of Intervention: Education, Socialization, Exploration, Clarifying and Problem-solving      Discipline Responsible: Psychoeducational Specialist      Signature:  Nabor Marie, MM, MT-BC

## 2021-12-17 PROCEDURE — 99233 SBSQ HOSP IP/OBS HIGH 50: CPT | Performed by: PSYCHIATRY & NEUROLOGY

## 2021-12-17 PROCEDURE — 6370000000 HC RX 637 (ALT 250 FOR IP): Performed by: PSYCHIATRY & NEUROLOGY

## 2021-12-17 PROCEDURE — 1240000000 HC EMOTIONAL WELLNESS R&B

## 2021-12-17 RX ADMIN — CLONIDINE HYDROCHLORIDE 0.1 MG: 0.1 TABLET ORAL at 08:51

## 2021-12-17 RX ADMIN — GUAIFENESIN 600 MG: 600 TABLET, EXTENDED RELEASE ORAL at 21:52

## 2021-12-17 RX ADMIN — CLONIDINE HYDROCHLORIDE 0.1 MG: 0.1 TABLET ORAL at 21:52

## 2021-12-17 RX ADMIN — LAMOTRIGINE 50 MG: 25 TABLET ORAL at 08:51

## 2021-12-17 RX ADMIN — ACETAMINOPHEN 650 MG: 325 TABLET ORAL at 13:30

## 2021-12-17 RX ADMIN — IBUPROFEN 400 MG: 400 TABLET, FILM COATED ORAL at 04:09

## 2021-12-17 RX ADMIN — DULOXETINE HYDROCHLORIDE 90 MG: 60 CAPSULE, DELAYED RELEASE ORAL at 08:50

## 2021-12-17 RX ADMIN — QUETIAPINE FUMARATE 50 MG: 25 TABLET ORAL at 21:52

## 2021-12-17 RX ADMIN — GUAIFENESIN 600 MG: 600 TABLET, EXTENDED RELEASE ORAL at 08:51

## 2021-12-17 RX ADMIN — AMLODIPINE BESYLATE 2.5 MG: 2.5 TABLET ORAL at 08:50

## 2021-12-17 ASSESSMENT — PAIN SCALES - GENERAL
PAINLEVEL_OUTOF10: 5
PAINLEVEL_OUTOF10: 0
PAINLEVEL_OUTOF10: 5

## 2021-12-17 NOTE — PLAN OF CARE
Patient alert and oriented x 3. Patient rates Depression 4/10 and Anxiety 0/10. Patient visible and social with peers. Patient denies SI/I/A/V/H. Patient attended and participated in wrap up group. Patient took HS medications. Patient denies self harm. No c/o's voiced at present.

## 2021-12-17 NOTE — PROGRESS NOTES
Behavioral Services                                              Medicare Re-Certification    I certify that the inpatient psychiatric hospital services furnished since the previous certification/re-certification were, and continue to be, medically necessary for;    [x] (1) Treatment which could reasonably be expected to improve the patient's condition,    [x] (2) Or for diagnostic study. Estimated length of stay/service 2-3 d    Plan for post-hospital care outpt    This patient continues to need, on a daily basis, active treatment furnished directly by or requiring the supervision of inpatient psychiatric personnel.     Electronically signed by Brisa Montelongo MD on 12/17/2021 at 1:13 PM

## 2021-12-17 NOTE — GROUP NOTE
Group Therapy Note    Date: 12/16/2021    Group Start Time: 2030  Group End Time: 2045  Group Topic: Wrap-Up    [address] [facility]        Group Therapy Note    Attendees: goals and importance of goal setting discussed. Night time milieu activities discussed. Patient's Goal:  Stay positive    Notes:  Successful     Status After Intervention:  Improved    Participation Level:  Active Listener and Interactive    Participation Quality: Appropriate and Attentive      Speech:  normal      Thought Process/Content: Logical  Linear      Affective Functioning: Congruent      Mood: euthymic      Level of consciousness:  Alert and Oriented x4      Response to Learning: Progressing to goal      Endings: None Reported    Modes of Intervention: Support      Discipline Responsible: Behavorial Health Tech      Signature:  Bernarda Palmer

## 2021-12-17 NOTE — PLAN OF CARE
Problem: Pain:  Description: Pain management should include both nonpharmacologic and pharmacologic interventions. Goal: Control of acute pain  Description: Control of acute pain  Note: Patient complaint of 5/10 headache pain. Given Tylenol per request, see MAR.

## 2021-12-17 NOTE — PROGRESS NOTES
Department of Psychiatry  AttendingProgress Note  Chief Complaint: depression  Lauro Biswas continues to improve. Attending all groups and working on ways to change her patterns of behavior including choice of partners and coping with trauma as a child with parents. Tolerating meds well. Patient's chart was reviewed and collaborated with  about the treatment plan. SUBJECTIVE:    Patient is feeling better. Suicidal ideation:  denies suicidal ideation. Patient does not have medication side effects. ROS: Patient has new complaints: no  Sleeping adequately:  Yes   Appetite adequate: Yes  Attending groups: Yes  Visitors:No    OBJECTIVE    Physical  VITALS:  BP (!) 162/101   Pulse 72   Temp 98 °F (36.7 °C) (Temporal)   Resp 16   Ht 5' 4\" (1.626 m)   Wt 150 lb (68 kg)   LMP  (LMP Unknown)   SpO2 99%   BMI 25.75 kg/m²     Mental Status Examination:  Patients appearance was street clothes. Thoughts are Goal directed. Homicidal ideations none. No abnormal movements, tics or mannerisms. Memory intact Aims 0. Concentration Good. Alert and oriented X 4. Insight and Judgement impaired insight. Patient was cooperative. Patient gait normal. Mood within normal limits, affect normal affect Hallucinations Absent, suicidal ideations no specific plan to harm self Speech normal volume  Data  Labs:   No visits with results within 2 Day(s) from this visit.    Latest known visit with results is:   Admission on 12/10/2021, Discharged on 12/10/2021   Component Date Value Ref Range Status    WBC 12/10/2021 8.2  4.0 - 11.0 K/uL Final    RBC 12/10/2021 4.52  4.00 - 5.20 M/uL Final    Hemoglobin 12/10/2021 15.7  12.0 - 16.0 g/dL Final    Hematocrit 12/10/2021 45.5  36.0 - 48.0 % Final    MCV 12/10/2021 100.6* 80.0 - 100.0 fL Final    MCH 12/10/2021 34.7* 26.0 - 34.0 pg Final    MCHC 12/10/2021 34.5  31.0 - 36.0 g/dL Final    RDW 12/10/2021 13.1  12.4 - 15.4 % Final    Platelets 44/97/5951 301  135 - 450 K/uL Final  MPV 12/10/2021 8.4  5.0 - 10.5 fL Final    Neutrophils % 12/10/2021 77.3  % Final    Lymphocytes % 12/10/2021 16.4  % Final    Monocytes % 12/10/2021 5.6  % Final    Eosinophils % 12/10/2021 0.3  % Final    Basophils % 12/10/2021 0.4  % Final    Neutrophils Absolute 12/10/2021 6.3  1.7 - 7.7 K/uL Final    Lymphocytes Absolute 12/10/2021 1.3  1.0 - 5.1 K/uL Final    Monocytes Absolute 12/10/2021 0.5  0.0 - 1.3 K/uL Final    Eosinophils Absolute 12/10/2021 0.0  0.0 - 0.6 K/uL Final    Basophils Absolute 12/10/2021 0.0  0.0 - 0.2 K/uL Final    Sodium 12/10/2021 134* 136 - 145 mmol/L Final    Potassium reflex Magnesium 12/10/2021 3.6  3.5 - 5.1 mmol/L Final    Chloride 12/10/2021 96* 99 - 110 mmol/L Final    CO2 12/10/2021 25  21 - 32 mmol/L Final    Anion Gap 12/10/2021 13  3 - 16 Final    Glucose 12/10/2021 116* 70 - 99 mg/dL Final    BUN 12/10/2021 8  7 - 20 mg/dL Final    CREATININE 12/10/2021 0.8  0.6 - 1.1 mg/dL Final    GFR Non- 12/10/2021 >60  >60 Final    Comment: >60 mL/min/1.73m2 EGFR, calc. for ages 25 and older using the  MDRD formula (not corrected for weight), is valid for stable  renal function.  GFR  12/10/2021 >60  >60 Final    Comment: Chronic Kidney Disease: less than 60 ml/min/1.73 sq.m. Kidney Failure: less than 15 ml/min/1.73 sq.m. Results valid for patients 18 years and older.       Calcium 12/10/2021 10.0  8.3 - 10.6 mg/dL Final    Total Protein 12/10/2021 7.6  6.4 - 8.2 g/dL Final    Albumin 12/10/2021 4.5  3.4 - 5.0 g/dL Final    Albumin/Globulin Ratio 12/10/2021 1.5  1.1 - 2.2 Final    Total Bilirubin 12/10/2021 0.9  0.0 - 1.0 mg/dL Final    Alkaline Phosphatase 12/10/2021 145* 40 - 129 U/L Final    ALT 12/10/2021 36  10 - 40 U/L Final    AST 12/10/2021 35  15 - 37 U/L Final    Color, UA 12/10/2021 YELLOW  Straw/Yellow Final    Clarity, UA 12/10/2021 CLOUDY* Clear Final    Glucose, Ur 12/10/2021 Negative  Negative mg/dL Final    Bilirubin Urine 12/10/2021 Negative  Negative Final    Ketones, Urine 12/10/2021 Negative  Negative mg/dL Final    Specific Gravity, UA 12/10/2021 1.009  1.005 - 1.030 Final    Blood, Urine 12/10/2021 Negative  Negative Final    pH, UA 12/10/2021 7.5  5.0 - 8.0 Final    Protein, UA 12/10/2021 Negative  Negative mg/dL Final    Urobilinogen, Urine 12/10/2021 0.2  <2.0 E.U./dL Final    Nitrite, Urine 12/10/2021 Negative  Negative Final    Leukocyte Esterase, Urine 12/10/2021 Negative  Negative Final    Microscopic Examination 12/10/2021 YES   Final    Urine Type 12/10/2021 NotGiven   Final    Urine received in a container without preservatives.  Urine Reflex to Culture 12/10/2021 Yes   Final    Amphetamine Screen, Urine 12/10/2021 Neg  Negative <1000ng/mL Final    Barbiturate Screen, Ur 12/10/2021 Neg  Negative <200 ng/mL Final    Benzodiazepine Screen, Urine 12/10/2021 Neg  Negative <200 ng/mL Final    Cannabinoid Scrn, Ur 12/10/2021 POSITIVE* Negative <50 ng/mL Final    Cocaine Metabolite Screen, Urine 12/10/2021 Neg  Negative <300 ng/mL Final    Opiate Scrn, Ur 12/10/2021 Neg  Negative <300 ng/mL Final    Comment: \"Therapeutic levels of pain medication, especially oxycontin and synthetic  opioids, may not be detected by this Methodology. Pain management screen  panel  Drug panel-PM-Hi Res Ur, Interp (PAIN) should be considered for drug  monitoring \".  PCP Screen, Urine 12/10/2021 Neg  Negative <25 ng/mL Final    Methadone Screen, Urine 12/10/2021 Neg  Negative <300 ng/mL Final    Propoxyphene Scrn, Ur 12/10/2021 Neg  Negative <300 ng/mL Final    Oxycodone Urine 12/10/2021 Neg  Negative <100 ng/ml Final    pH, UA 12/10/2021 7.5   Final    Comment: Urine pH less than 5.0 or greater than 8.0 may indicate sample adulteration. Another sample should be collected if clinically  indicated.  Drug Screen Comment: 12/10/2021 see below   Final    Comment:  This method is a screening test to detect only these drug  classes as part of a medical workup. Confirmatory testing  by another method should be ordered if clinically indicated.  Acetaminophen Level 12/10/2021 <5* 10 - 30 ug/mL Final    Comment: Therapeutic Range: 10.0-30.0 ug/mL  Toxic: >=146 ug/mL      Salicylate, Serum 24/93/4076 <0.3* 15.0 - 30.0 mg/dL Final    Comment: Therapeutic Range: 15.0-30.0 mg/dL  Toxic: >30.0 mg/dL      Ethanol Lvl 12/10/2021 None Detected  mg/dL Final    Comment:    None Detected  Conversion factor:  100 mg/dl = .100 g/dl  For Medical Purposes Only      Bacteria, UA 12/10/2021 1+* None Seen /HPF Final    Hyaline Casts, UA 12/10/2021 0  0 - 8 /LPF Final    WBC, UA 12/10/2021 12* 0 - 5 /HPF Final    RBC, UA 12/10/2021 1  0 - 4 /HPF Final    Epithelial Cells, UA 12/10/2021 21* 0 - 5 /HPF Final    Comment: Urinalysis microscopic performed using the  automated methodology (AUWI analyzer).  Urine Culture, Routine 12/10/2021 No growth at 18 to 36 hours   Final    SARS-CoV-2, NAAT 12/10/2021 Not Detected  Not Detected Final    Comment: Rapid NAAT:   Negative results should be treated as presumptive and,  if inconsistent with clinical signs and symptoms or necessary for  patient management, should be tested with an alternative molecular  assay. Negative results do not preclude SARS-CoV-2 infection and  should not be used as the sole basis for patient management decisions. This test has been authorized by the FDA under an Emergency Use  Authorization (EUA) for use by authorized laboratories.     Fact sheet for Healthcare Providers:  BuildHer.es  Fact sheet for Patients: BuildHer.es    METHODOLOGY: Isothermal Nucleic Acid Amplification              Medications  Current Facility-Administered Medications: guaiFENesin (MUCINEX) extended release tablet 600 mg, 600 mg, Oral, BID  QUEtiapine (SEROQUEL) tablet 50 mg, 50 mg, Oral, Nightly  sennosides-docusate sodium (SENOKOT-S) 8.6-50 MG tablet 2 tablet, 2 tablet, Oral, Daily  lamoTRIgine (LAMICTAL) tablet 50 mg, 50 mg, Oral, Daily  nicotine (NICODERM CQ) 21 MG/24HR 1 patch, 1 patch, TransDERmal, Daily  nicotine polacrilex (COMMIT) lozenge 2 mg, 2 mg, Oral, Q1H PRN  acetaminophen (TYLENOL) tablet 650 mg, 650 mg, Oral, Q4H PRN  ibuprofen (ADVIL;MOTRIN) tablet 400 mg, 400 mg, Oral, Q6H PRN  magnesium hydroxide (MILK OF MAGNESIA) 400 MG/5ML suspension 30 mL, 30 mL, Oral, Daily PRN  aluminum & magnesium hydroxide-simethicone (MAALOX) 200-200-20 MG/5ML suspension 30 mL, 30 mL, Oral, Q6H PRN  OLANZapine (ZYPREXA) tablet 10 mg, 10 mg, Oral, Q8H PRN **OR** OLANZapine (ZYPREXA) injection 10 mg, 10 mg, IntraMUSCular, Q8H PRN  sterile water injection 2.1 mL, 2.1 mL, IntraMUSCular, Q8H PRN  benztropine mesylate (COGENTIN) injection 2 mg, 2 mg, IntraMUSCular, BID PRN  cloNIDine (CATAPRES) tablet 0.1 mg, 0.1 mg, Oral, BID  amLODIPine (NORVASC) tablet 2.5 mg, 2.5 mg, Oral, Daily  DULoxetine (CYMBALTA) extended release capsule 90 mg, 90 mg, Oral, Daily    ASSESSMENT AND PLAN    Principal Problem:    Borderline personality disorder (HCC)  Active Problems:    Primary hypertension    Alcohol use disorder, moderate, in controlled environment ([facility] [address]    Cannabis abuse    Cocaine use disorder, moderate, in early remission (HCC)    Major depressive disorder, recurrent severe without psychotic features ([facility] [address]    Tobacco dependence    GERD without esophagitis  Resolved Problems:    * No resolved hospital problems. *       1. Patient s symptoms   are improving  2. Probable discharge is 2-3 d  3. Discharge planning is complete  4. Suicidal ideation is none  5. Total time with patient was 40 minutes and more than 50 % of that time was spent counseling the patient on their symptoms, treatment and expected goals.

## 2021-12-17 NOTE — BH NOTE
Patient states she \"feels good\" and is \"doing real well. \" States she just needs to get some things taken care of with SW today. Declines SI. Declines thoughts to harm self. States she is going to follow up with [facility] at discharge. Declines current needs.

## 2021-12-17 NOTE — GROUP NOTE
Group Therapy Note    Date: 12/17/2021    Group Start Time: 1000  Group End Time: 1100  Group Topic: Cognitive Skills    [address] [facility] [address]     Attendees: 6     Notes:  Vilma 45 attended group for the full duration. La completed the activity and interacted appropriately with others in the group. Status After Intervention:  Improved    Participation Level:  Active Listener and Interactive    Participation Quality: Appropriate and Attentive      Speech:  normal      Thought Process/Content: Logical      Affective Functioning: Congruent      Mood: Brightened, Engaged       Level of consciousness:  Alert, Oriented x4 and Attentive      Response to Learning: Able to verbalize current knowledge/experience      Endings: None Reported    Modes of Intervention: Exploration      Discipline Responsible: Behavorial Health Tech      Signature:  RADHA Urbina

## 2021-12-17 NOTE — FLOWSHEET NOTE
Purposeful Rounding    Patient Location: [facility]    Patient willing to engage in conversation: Yes    Presentation/behavior: Cooperative and Pleasant    Affect: Brightens with interaction    Concerns reported: None    PRN medications given: None    Environmental assessment: No safety Hazards    Fall prevention interventions in place: Yellow non-skid socks on    Daily Jaiden Fall Risk Score: 65    Daily Blevins Fall Risk Score: Low risk      Electronically signed by Ryan Alexander RN on 12/16/21 at 8:43 PM EST

## 2021-12-18 PROCEDURE — 1240000000 HC EMOTIONAL WELLNESS R&B

## 2021-12-18 PROCEDURE — 6370000000 HC RX 637 (ALT 250 FOR IP): Performed by: PSYCHIATRY & NEUROLOGY

## 2021-12-18 RX ADMIN — GUAIFENESIN 600 MG: 600 TABLET, EXTENDED RELEASE ORAL at 08:54

## 2021-12-18 RX ADMIN — CLONIDINE HYDROCHLORIDE 0.1 MG: 0.1 TABLET ORAL at 08:53

## 2021-12-18 RX ADMIN — ACETAMINOPHEN 650 MG: 325 TABLET ORAL at 04:48

## 2021-12-18 RX ADMIN — DULOXETINE HYDROCHLORIDE 90 MG: 60 CAPSULE, DELAYED RELEASE ORAL at 08:53

## 2021-12-18 RX ADMIN — QUETIAPINE FUMARATE 50 MG: 25 TABLET ORAL at 21:28

## 2021-12-18 RX ADMIN — OLANZAPINE 10 MG: 10 TABLET, FILM COATED ORAL at 12:07

## 2021-12-18 RX ADMIN — GUAIFENESIN 600 MG: 600 TABLET, EXTENDED RELEASE ORAL at 21:28

## 2021-12-18 RX ADMIN — LAMOTRIGINE 50 MG: 25 TABLET ORAL at 08:54

## 2021-12-18 RX ADMIN — CLONIDINE HYDROCHLORIDE 0.1 MG: 0.1 TABLET ORAL at 21:28

## 2021-12-18 RX ADMIN — IBUPROFEN 400 MG: 400 TABLET, FILM COATED ORAL at 21:32

## 2021-12-18 RX ADMIN — AMLODIPINE BESYLATE 2.5 MG: 2.5 TABLET ORAL at 08:53

## 2021-12-18 ASSESSMENT — PAIN SCALES - GENERAL
PAINLEVEL_OUTOF10: 5
PAINLEVEL_OUTOF10: 5

## 2021-12-18 ASSESSMENT — PAIN DESCRIPTION - LOCATION: LOCATION: GENERALIZED

## 2021-12-18 ASSESSMENT — PAIN DESCRIPTION - PROGRESSION: CLINICAL_PROGRESSION: NOT CHANGED

## 2021-12-18 ASSESSMENT — PAIN DESCRIPTION - ORIENTATION: ORIENTATION: OTHER (COMMENT)

## 2021-12-18 ASSESSMENT — PAIN DESCRIPTION - ONSET: ONSET: GRADUAL

## 2021-12-18 ASSESSMENT — PAIN DESCRIPTION - DESCRIPTORS: DESCRIPTORS: ACHING

## 2021-12-18 ASSESSMENT — PAIN - FUNCTIONAL ASSESSMENT: PAIN_FUNCTIONAL_ASSESSMENT: ACTIVITIES ARE NOT PREVENTED

## 2021-12-18 ASSESSMENT — PAIN DESCRIPTION - PAIN TYPE: TYPE: ACUTE PAIN

## 2021-12-18 NOTE — GROUP NOTE
Group Therapy Note    Date: 12/17/2021    Group Start Time: 2015  Group End Time: 2030  Group Topic: Wrap-Up    [facility]        Group Therapy Note    Attendees: Goals and importance of goal setting discussed. Night time milieu activities discussed. Patient's Goal:  Stay safe, felt like cutting self when didn't get to go home    Notes:  Successful     Status After Intervention:  Improved    Participation Level:  Active Listener and Interactive    Participation Quality: Appropriate, Attentive and Sharing      Speech:  normal      Thought Process/Content: Logical  Linear      Affective Functioning: Congruent      Mood: euthymic      Level of consciousness:  Alert and Oriented x4      Response to Learning: Progressing to goal      Endings: Self-harm    Modes of Intervention: Support      Discipline Responsible: [address], [employer] [facility]      Signature:  Priscilla Dos Santos

## 2021-12-18 NOTE — PLAN OF CARE
Problem: Altered Mood, Depressive Behavior:  Goal: Able to verbalize and/or display a decrease in depressive symptoms  Description: Able to verbalize and/or display a decrease in depressive symptoms  Outcome: Ongoing     Problem: Altered Mood, Depressive Behavior:  Goal: Absence of self-harm  Description: Absence of self-harm  Outcome: Ongoing     Problem: Pain:  Goal: Control of chronic pain  Description: Control of chronic pain  Outcome: Ongoing   La is visible and social in the milieu, denies SI,HI or AVH and CFS. She offers no c/o pain or discomfort this morning, eating and taking fluids well. Refused her senna, reporting her bowels are fine.

## 2021-12-18 NOTE — GROUP NOTE
Group Therapy Note    Date: 12/18/2021    Group Start Time: 10:00 AM  Group End Time: 10:30 AM  Group Topic: [id number], [place]        Group Therapy Note    Attendees: 9         Patient's Goal:  Relax and take a break. Notes: Pt was appropriate and participated in group. Status After Intervention:  Improved    Participation Level:  Active Listener and Interactive    Participation Quality: Appropriate, Attentive, Sharing and Supportive      Speech:  normal      Thought Process/Content: Logical      Affective Functioning: Congruent      Mood: euthymic      Level of consciousness:  Alert, Oriented x4 and Attentive      Response to Learning: Progressing to goal      Endings: None Reported    Modes of Intervention: Exploration, Confrontation and Limit-setting      Discipline Responsible: /Counselor      Signature:  STEPHAN Lai

## 2021-12-18 NOTE — GROUP NOTE
Group Therapy Note    Date: 12/18/2021    Group Start Time: 1:00 PM  Group End Time: 2:00 PM  Group Topic: STEPHAN Xie        Group Therapy Note    Attendees: 10       Patient's Goal: Learn about codependency. Receive handouts on how to set healthy boundaries. Notes: Pt left group early- did not return.      Status After Intervention:  Unchanged    Participation Level: Minimal    Participation Quality: Resistant      Speech: soft      Thought Process/Content: Logical      Affective Functioning: Congruent      Mood: euthymic      Level of consciousness:  Alert and Oriented x4      Response to Learning: Resistant      Endings: None Reported    Modes of Intervention: Exploration, Confrontation and Limit-setting      Discipline Responsible: /Counselor      Signature:  STEPHAN Rojo

## 2021-12-18 NOTE — FLOWSHEET NOTE
Purposeful Rounding    Patient Location: [facility]    Patient willing to engage in conversation: Yes    Presentation/behavior: Cooperative and Pleasant    Affect: Brightens with interaction    Concerns reported: None    PRN medications given: None    Environmental assessment: No safety hazards noted    Fall prevention interventions in place: Yellow non-skid socks on    Daily Jaiden Fall Risk Score: 65    Daily Blevins Fall Risk Score: Low risk      Electronically signed by Tariq Roberts RN on 12/17/21 at 10:11 PM EST

## 2021-12-18 NOTE — PLAN OF CARE
Patient alert and oriented x 3. Patient rates Depression 7/10 and Anxiety 5/10. Patient denies SI/HI/A/V/H. Patient visible and social with peers on the milieu. Patient attended and participated in wrap up group. Patient took HS medications. Patient denies self harm. No c/o's voiced at present.

## 2021-12-18 NOTE — BH NOTE
Pt reports she is feeling better since the Zyprexa. No further crying and attended group this afternoon.

## 2021-12-18 NOTE — BH NOTE
Renzo Monica presented to TS to report she was feeling like she might harm herself and asked for room door to be locked and she gave RN several gel inkpens. Medicated with Zyprexa 10 mg for anxiety.

## 2021-12-19 PROCEDURE — 1240000000 HC EMOTIONAL WELLNESS R&B

## 2021-12-19 PROCEDURE — 99233 SBSQ HOSP IP/OBS HIGH 50: CPT | Performed by: PSYCHIATRY & NEUROLOGY

## 2021-12-19 PROCEDURE — 6370000000 HC RX 637 (ALT 250 FOR IP): Performed by: PSYCHIATRY & NEUROLOGY

## 2021-12-19 RX ADMIN — ACETAMINOPHEN 650 MG: 325 TABLET ORAL at 21:49

## 2021-12-19 RX ADMIN — DULOXETINE HYDROCHLORIDE 90 MG: 60 CAPSULE, DELAYED RELEASE ORAL at 08:36

## 2021-12-19 RX ADMIN — LAMOTRIGINE 50 MG: 25 TABLET ORAL at 08:36

## 2021-12-19 RX ADMIN — QUETIAPINE FUMARATE 50 MG: 25 TABLET ORAL at 21:49

## 2021-12-19 RX ADMIN — AMLODIPINE BESYLATE 2.5 MG: 2.5 TABLET ORAL at 08:37

## 2021-12-19 RX ADMIN — GUAIFENESIN 600 MG: 600 TABLET, EXTENDED RELEASE ORAL at 21:49

## 2021-12-19 RX ADMIN — CLONIDINE HYDROCHLORIDE 0.1 MG: 0.1 TABLET ORAL at 08:36

## 2021-12-19 RX ADMIN — GUAIFENESIN 600 MG: 600 TABLET, EXTENDED RELEASE ORAL at 08:36

## 2021-12-19 RX ADMIN — CLONIDINE HYDROCHLORIDE 0.1 MG: 0.1 TABLET ORAL at 21:49

## 2021-12-19 ASSESSMENT — PAIN - FUNCTIONAL ASSESSMENT: PAIN_FUNCTIONAL_ASSESSMENT: ACTIVITIES ARE NOT PREVENTED

## 2021-12-19 ASSESSMENT — PAIN SCALES - GENERAL: PAINLEVEL_OUTOF10: 5

## 2021-12-19 ASSESSMENT — PAIN DESCRIPTION - DESCRIPTORS: DESCRIPTORS: ACHING

## 2021-12-19 ASSESSMENT — PAIN DESCRIPTION - FREQUENCY: FREQUENCY: INTERMITTENT

## 2021-12-19 ASSESSMENT — PAIN DESCRIPTION - ONSET: ONSET: ON-GOING

## 2021-12-19 ASSESSMENT — PAIN DESCRIPTION - LOCATION: LOCATION: GENERALIZED

## 2021-12-19 ASSESSMENT — PAIN DESCRIPTION - PAIN TYPE: TYPE: CHRONIC PAIN

## 2021-12-19 ASSESSMENT — PAIN DESCRIPTION - ORIENTATION: ORIENTATION: OTHER (COMMENT)

## 2021-12-19 ASSESSMENT — PAIN DESCRIPTION - PROGRESSION: CLINICAL_PROGRESSION: GRADUALLY WORSENING

## 2021-12-19 NOTE — GROUP NOTE
Group Therapy Note    Date: 12/18/2021    Group Start Time: 8:00 PM  Group End Time: 8:30 PM  Group Topic: [address] STE[address]AN [address]        Group Therapy Note    Attendees: 10      Patient's Goal: Did not meet goal.     Status After Intervention:  Unchanged    Participation Level: Minimal    Participation Quality: Appropriate and Attentive      Speech:  normal      Thought Process/Content: Logical      Affective Functioning: Flat      Mood: depressed      Level of consciousness:  Alert, Oriented x4 and Attentive      Response to Learning: Resistant      Endings: None Reported    Modes of Intervention: Support and Socialization      Discipline Responsible: /Counselor      Signature:  STEPHAN Vázquez

## 2021-12-19 NOTE — GROUP NOTE
Group Therapy Note    Date: 12/19/2021    Group Start Time: 4:00 PM  Group End Time: 5:00 PM  Group Topic: STEPHAN Xie        Group Therapy Note    Attendees: 6     Patient's Goal:  Pt will paint their safe space. Notes:  Pt was appropriate and participated in group. Status After Intervention:  Improved    Participation Level:  Active Listener and Interactive    Participation Quality: Appropriate, Attentive, Sharing and Supportive      Speech:  normal      Thought Process/Content: Logical      Affective Functioning: Congruent      Mood: euthymic      Level of consciousness:  Alert, Oriented x4 and Attentive      Response to Learning: Able to verbalize current knowledge/experience and Progressing to goal      Endings: None Reported    Modes of Intervention: Activity      Discipline Responsible: /Counselor      Signature:  STEPHAN Byrd

## 2021-12-19 NOTE — GROUP NOTE
Group Therapy Note    Date: 12/19/2021    Group Start Time: 10:30 AM  Group End Time: 11:30 AM  Group Topic: STEPHAN Xie        Group Therapy Note    Attendees: 7       Patient's Goal: Pt will practice assertive communication skills. Notes:  Pt was appropriate and participated in group. Status After Intervention:  Improved    Participation Level:  Active Listener and Interactive    Participation Quality: Appropriate, Attentive, Sharing and Supportive      Speech:  normal      Thought Process/Content: Logical      Affective Functioning: Congruent      Mood: euthymic      Level of consciousness:  Alert, Oriented x4 and Attentive      Response to Learning: Able to verbalize current knowledge/experience, Able to verbalize/acknowledge new learning, Able to retain information, Able to change behavior and Progressing to goal      Endings: None Reported    Modes of Intervention: Education, Socialization and Activity      Discipline Responsible: /Counselor      Signature:  STEPHAN Holman

## 2021-12-19 NOTE — PLAN OF CARE
Patient was out with patient group, early in the shift & was social. Patient was also noted having a lengthy phone call. Patient reported having a good day. \"It was also a very emotional day. \" Patient denied feelings of self harm.  Faby Dominguez R.N.

## 2021-12-19 NOTE — PLAN OF CARE
Pt is alert and oriented X4. She is pleasant and cooperative with care. She is goal oriented and looks forward to discharge, but admits she is not ready today. She denies SI/HI/AVH. She denies any needs.

## 2021-12-19 NOTE — GROUP NOTE
Group Therapy Note    Date: 12/19/2021    Group Start Time: 10:00 AM  Group End Time: 10:30 AM  Group Topic: [id number] [place], [place]        Group Therapy Note    Attendees: 8    Patient's Goal: Stay positive, make one phone call. Notes:  Pt was appropriate and participated in group. Status After Intervention:  Improved    Participation Level:  Active Listener and Interactive    Participation Quality: Appropriate, Attentive, Sharing and Supportive      Speech:  normal      Thought Process/Content: Logical      Affective Functioning: Congruent      Mood: euthymic      Level of consciousness:  Alert, Oriented x4 and Attentive      Response to Learning: Progressing to goal      Endings: None Reported    Modes of Intervention: Support and Socialization      Discipline Responsible: /Counselor      Signature:  STEPHAN Reid

## 2021-12-19 NOTE — PROGRESS NOTES
Department of Psychiatry  AttendingProgress Note  Chief Complaint: depression  La improving . She was able to call her daughter and talk with her about therapy together. Was not greeted kindly and had to end the call. Has not ever seen her 10 yo grandchild. She initially felt suicidal after the call but able to process with staff and felt safe. She feels safe at this time and is able to prepare for DC 12/20. Patient's chart was reviewed and collaborated with  about the treatment plan. SUBJECTIVE:    Patient is feeling better. Suicidal ideation:  denies suicidal ideation. Patient does not have medication side effects. ROS: Patient has new complaints: no  Sleeping adequately:  Yes   Appetite adequate: Yes  Attending groups: Yes  Visitors:No    OBJECTIVE    Physical  VITALS:  BP (!) 144/96   Pulse 78   Temp 98 °F (36.7 °C) (Temporal)   Resp 18   Ht 5' 4\" (1.626 m)   Wt 150 lb (68 kg)   LMP  (LMP Unknown)   SpO2 98%   BMI 25.75 kg/m²     Mental Status Examination:  Patients appearance was street clothes. Thoughts are Goal directed. Homicidal ideations none. No abnormal movements, tics or mannerisms. Memory intact Aims 0. Concentration Good. Alert and oriented X 4. Insight and Judgement normal insight and judgment. Patient was cooperative. Patient gait normal. Mood within normal limits, affect normal affect Hallucinations Absent, suicidal ideations no specific plan to harm self Speech normal volume  Data  Labs:   No visits with results within 2 Day(s) from this visit.    Latest known visit with results is:   Admission on 12/10/2021, Discharged on 12/10/2021   Component Date Value Ref Range Status    WBC 12/10/2021 8.2  4.0 - 11.0 K/uL Final    RBC 12/10/2021 4.52  4.00 - 5.20 M/uL Final    Hemoglobin 12/10/2021 15.7  12.0 - 16.0 g/dL Final    Hematocrit 12/10/2021 45.5  36.0 - 48.0 % Final    MCV 12/10/2021 100.6* 80.0 - 100.0 fL Final    MCH 12/10/2021 34.7* 26.0 - 34.0 pg Final    MCHC 12/10/2021 34.5  31.0 - 36.0 g/dL Final    RDW 12/10/2021 13.1  12.4 - 15.4 % Final    Platelets 17/41/3662 301  135 - 450 K/uL Final    MPV 12/10/2021 8.4  5.0 - 10.5 fL Final    Neutrophils % 12/10/2021 77.3  % Final    Lymphocytes % 12/10/2021 16.4  % Final    Monocytes % 12/10/2021 5.6  % Final    Eosinophils % 12/10/2021 0.3  % Final    Basophils % 12/10/2021 0.4  % Final    Neutrophils Absolute 12/10/2021 6.3  1.7 - 7.7 K/uL Final    Lymphocytes Absolute 12/10/2021 1.3  1.0 - 5.1 K/uL Final    Monocytes Absolute 12/10/2021 0.5  0.0 - 1.3 K/uL Final    Eosinophils Absolute 12/10/2021 0.0  0.0 - 0.6 K/uL Final    Basophils Absolute 12/10/2021 0.0  0.0 - 0.2 K/uL Final    Sodium 12/10/2021 134* 136 - 145 mmol/L Final    Potassium reflex Magnesium 12/10/2021 3.6  3.5 - 5.1 mmol/L Final    Chloride 12/10/2021 96* 99 - 110 mmol/L Final    CO2 12/10/2021 25  21 - 32 mmol/L Final    Anion Gap 12/10/2021 13  3 - 16 Final    Glucose 12/10/2021 116* 70 - 99 mg/dL Final    BUN 12/10/2021 8  7 - 20 mg/dL Final    CREATININE 12/10/2021 0.8  0.6 - 1.1 mg/dL Final    GFR Non- 12/10/2021 >60  >60 Final    Comment: >60 mL/min/1.73m2 EGFR, calc. for ages 25 and older using the  MDRD formula (not corrected for weight), is valid for stable  renal function.  GFR  12/10/2021 >60  >60 Final    Comment: Chronic Kidney Disease: less than 60 ml/min/1.73 sq.m. Kidney Failure: less than 15 ml/min/1.73 sq.m. Results valid for patients 18 years and older.       Calcium 12/10/2021 10.0  8.3 - 10.6 mg/dL Final    Total Protein 12/10/2021 7.6  6.4 - 8.2 g/dL Final    Albumin 12/10/2021 4.5  3.4 - 5.0 g/dL Final    Albumin/Globulin Ratio 12/10/2021 1.5  1.1 - 2.2 Final    Total Bilirubin 12/10/2021 0.9  0.0 - 1.0 mg/dL Final    Alkaline Phosphatase 12/10/2021 145* 40 - 129 U/L Final    ALT 12/10/2021 36  10 - 40 U/L Final    AST 12/10/2021 35  15 - 37 U/L Final    Color, UA 12/10/2021 YELLOW  Straw/Yellow Final    Clarity, UA 12/10/2021 CLOUDY* Clear Final    Glucose, Ur 12/10/2021 Negative  Negative mg/dL Final    Bilirubin Urine 12/10/2021 Negative  Negative Final    Ketones, Urine 12/10/2021 Negative  Negative mg/dL Final    Specific Gravity, UA 12/10/2021 1.009  1.005 - 1.030 Final    Blood, Urine 12/10/2021 Negative  Negative Final    pH, UA 12/10/2021 7.5  5.0 - 8.0 Final    Protein, UA 12/10/2021 Negative  Negative mg/dL Final    Urobilinogen, Urine 12/10/2021 0.2  <2.0 E.U./dL Final    Nitrite, Urine 12/10/2021 Negative  Negative Final    Leukocyte Esterase, Urine 12/10/2021 Negative  Negative Final    Microscopic Examination 12/10/2021 YES   Final    Urine Type 12/10/2021 NotGiven   Final    Urine received in a container without preservatives.  Urine Reflex to Culture 12/10/2021 Yes   Final    Amphetamine Screen, Urine 12/10/2021 Neg  Negative <1000ng/mL Final    Barbiturate Screen, Ur 12/10/2021 Neg  Negative <200 ng/mL Final    Benzodiazepine Screen, Urine 12/10/2021 Neg  Negative <200 ng/mL Final    Cannabinoid Scrn, Ur 12/10/2021 POSITIVE* Negative <50 ng/mL Final    Cocaine Metabolite Screen, Urine 12/10/2021 Neg  Negative <300 ng/mL Final    Opiate Scrn, Ur 12/10/2021 Neg  Negative <300 ng/mL Final    Comment: \"Therapeutic levels of pain medication, especially oxycontin and synthetic  opioids, may not be detected by this Methodology. Pain management screen  panel  Drug panel-PM-Hi Res Ur, Interp (PAIN) should be considered for drug  monitoring \".  PCP Screen, Urine 12/10/2021 Neg  Negative <25 ng/mL Final    Methadone Screen, Urine 12/10/2021 Neg  Negative <300 ng/mL Final    Propoxyphene Scrn, Ur 12/10/2021 Neg  Negative <300 ng/mL Final    Oxycodone Urine 12/10/2021 Neg  Negative <100 ng/ml Final    pH, UA 12/10/2021 7.5   Final    Comment: Urine pH less than 5.0 or greater than 8.0 may indicate sample adulteration.   Another sample should be collected if clinically  indicated.  Drug Screen Comment: 12/10/2021 see below   Final    Comment: This method is a screening test to detect only these drug  classes as part of a medical workup. Confirmatory testing  by another method should be ordered if clinically indicated.  Acetaminophen Level 12/10/2021 <5* 10 - 30 ug/mL Final    Comment: Therapeutic Range: 10.0-30.0 ug/mL  Toxic: >=147 ug/mL      Salicylate, Serum 30/94/1990 <0.3* 15.0 - 30.0 mg/dL Final    Comment: Therapeutic Range: 15.0-30.0 mg/dL  Toxic: >30.0 mg/dL      Ethanol Lvl 12/10/2021 None Detected  mg/dL Final    Comment:    None Detected  Conversion factor:  100 mg/dl = .100 g/dl  For Medical Purposes Only      Bacteria, UA 12/10/2021 1+* None Seen /HPF Final    Hyaline Casts, UA 12/10/2021 0  0 - 8 /LPF Final    WBC, UA 12/10/2021 12* 0 - 5 /HPF Final    RBC, UA 12/10/2021 1  0 - 4 /HPF Final    Epithelial Cells, UA 12/10/2021 21* 0 - 5 /HPF Final    Comment: Urinalysis microscopic performed using the  automated methodology (AUWI analyzer).  Urine Culture, Routine 12/10/2021 No growth at 18 to 36 hours   Final    SARS-CoV-2, NAAT 12/10/2021 Not Detected  Not Detected Final    Comment: Rapid NAAT:   Negative results should be treated as presumptive and,  if inconsistent with clinical signs and symptoms or necessary for  patient management, should be tested with an alternative molecular  assay. Negative results do not preclude SARS-CoV-2 infection and  should not be used as the sole basis for patient management decisions. This test has been authorized by the FDA under an Emergency Use  Authorization (EUA) for use by authorized laboratories.     Fact sheet for Healthcare Providers:  Maddy.mariela  Fact sheet for Patients: Maddy.mariela    METHODOLOGY: Isothermal Nucleic Acid Amplification              Medications  Current Facility-Administered Medications: guaiFENesin (MUCINEX) extended release tablet 600 mg, 600 mg, Oral, BID  QUEtiapine (SEROQUEL) tablet 50 mg, 50 mg, Oral, Nightly  sennosides-docusate sodium (SENOKOT-S) 8.6-50 MG tablet 2 tablet, 2 tablet, Oral, Daily  lamoTRIgine (LAMICTAL) tablet 50 mg, 50 mg, Oral, Daily  nicotine (NICODERM CQ) 21 MG/24HR 1 patch, 1 patch, TransDERmal, Daily  nicotine polacrilex (COMMIT) lozenge 2 mg, 2 mg, Oral, Q1H PRN  acetaminophen (TYLENOL) tablet 650 mg, 650 mg, Oral, Q4H PRN  ibuprofen (ADVIL;MOTRIN) tablet 400 mg, 400 mg, Oral, Q6H PRN  magnesium hydroxide (MILK OF MAGNESIA) 400 MG/5ML suspension 30 mL, 30 mL, Oral, Daily PRN  aluminum & magnesium hydroxide-simethicone (MAALOX) 200-200-20 MG/5ML suspension 30 mL, 30 mL, Oral, Q6H PRN  OLANZapine (ZYPREXA) tablet 10 mg, 10 mg, Oral, Q8H PRN **OR** OLANZapine (ZYPREXA) injection 10 mg, 10 mg, IntraMUSCular, Q8H PRN  sterile water injection 2.1 mL, 2.1 mL, IntraMUSCular, Q8H PRN  benztropine mesylate (COGENTIN) injection 2 mg, 2 mg, IntraMUSCular, BID PRN  cloNIDine (CATAPRES) tablet 0.1 mg, 0.1 mg, Oral, BID  amLODIPine (NORVASC) tablet 2.5 mg, 2.5 mg, Oral, Daily  DULoxetine (CYMBALTA) extended release capsule 90 mg, 90 mg, Oral, Daily    ASSESSMENT AND PLAN    Principal Problem:    Borderline personality disorder (HCC)  Active Problems:    Primary hypertension    Alcohol use disorder, moderate, in controlled environment ([facility] [address]    Cannabis abuse    Cocaine use disorder, moderate, in early remission ([facility])    Major depressive disorder, recurrent severe without psychotic features ([facility] [address]    Tobacco dependence    GERD without esophagitis  Resolved Problems:    * No resolved hospital problems. *       1. Patient s symptoms   are improving  2. Probable discharge is tomorrow  3. Discharge planning is complete  4. Suicidal ideation is none  5.  Total time with patient was 40 minutes and more than 50 % of that time was spent counseling the patient on their symptoms, treatment and expected goals.

## 2021-12-20 VITALS
BODY MASS INDEX: 25.61 KG/M2 | HEIGHT: 64 IN | DIASTOLIC BLOOD PRESSURE: 76 MMHG | OXYGEN SATURATION: 97 % | WEIGHT: 150 LBS | RESPIRATION RATE: 16 BRPM | HEART RATE: 70 BPM | SYSTOLIC BLOOD PRESSURE: 132 MMHG | TEMPERATURE: 97.1 F

## 2021-12-20 PROCEDURE — 6370000000 HC RX 637 (ALT 250 FOR IP): Performed by: PSYCHIATRY & NEUROLOGY

## 2021-12-20 PROCEDURE — 99239 HOSP IP/OBS DSCHRG MGMT >30: CPT | Performed by: PSYCHIATRY & NEUROLOGY

## 2021-12-20 PROCEDURE — 5130000000 HC BRIDGE APPOINTMENT

## 2021-12-20 RX ORDER — DULOXETIN HYDROCHLORIDE 30 MG/1
90 CAPSULE, DELAYED RELEASE ORAL DAILY
Qty: 30 CAPSULE | Refills: 0 | Status: SHIPPED | OUTPATIENT
Start: 2021-12-21 | End: 2022-02-18 | Stop reason: SDUPTHER

## 2021-12-20 RX ORDER — LAMOTRIGINE 25 MG/1
50 TABLET ORAL DAILY
Qty: 30 TABLET | Refills: 3 | Status: SHIPPED | OUTPATIENT
Start: 2021-12-21 | End: 2021-12-20

## 2021-12-20 RX ORDER — QUETIAPINE FUMARATE 50 MG/1
50 TABLET, FILM COATED ORAL NIGHTLY
Qty: 60 TABLET | Refills: 3 | Status: SHIPPED | OUTPATIENT
Start: 2021-12-20 | End: 2021-12-20

## 2021-12-20 RX ORDER — DULOXETIN HYDROCHLORIDE 30 MG/1
90 CAPSULE, DELAYED RELEASE ORAL DAILY
Qty: 30 CAPSULE | Refills: 3 | Status: SHIPPED | OUTPATIENT
Start: 2021-12-21 | End: 2021-12-20

## 2021-12-20 RX ORDER — SENNA AND DOCUSATE SODIUM 50; 8.6 MG/1; MG/1
2 TABLET, FILM COATED ORAL DAILY
Qty: 60 TABLET | Refills: 0 | Status: SHIPPED | OUTPATIENT
Start: 2021-12-21

## 2021-12-20 RX ORDER — QUETIAPINE FUMARATE 50 MG/1
50 TABLET, FILM COATED ORAL NIGHTLY
Qty: 30 TABLET | Refills: 0 | Status: ON HOLD | OUTPATIENT
Start: 2021-12-20 | End: 2022-03-24 | Stop reason: HOSPADM

## 2021-12-20 RX ORDER — LAMOTRIGINE 25 MG/1
50 TABLET ORAL DAILY
Qty: 60 TABLET | Refills: 0 | Status: SHIPPED | OUTPATIENT
Start: 2021-12-21

## 2021-12-20 RX ORDER — AMLODIPINE BESYLATE 2.5 MG/1
2.5 TABLET ORAL DAILY
Qty: 30 TABLET | Refills: 0 | Status: SHIPPED | OUTPATIENT
Start: 2021-12-20

## 2021-12-20 RX ORDER — SENNA AND DOCUSATE SODIUM 50; 8.6 MG/1; MG/1
2 TABLET, FILM COATED ORAL DAILY
Qty: 60 TABLET | Refills: 0 | Status: SHIPPED | OUTPATIENT
Start: 2021-12-21 | End: 2021-12-20

## 2021-12-20 RX ORDER — CLONIDINE HYDROCHLORIDE 0.1 MG/1
0.1 TABLET ORAL 2 TIMES DAILY
Qty: 60 TABLET | Refills: 0 | Status: SHIPPED | OUTPATIENT
Start: 2021-12-20

## 2021-12-20 RX ADMIN — LAMOTRIGINE 50 MG: 25 TABLET ORAL at 08:38

## 2021-12-20 RX ADMIN — GUAIFENESIN 600 MG: 600 TABLET, EXTENDED RELEASE ORAL at 08:38

## 2021-12-20 RX ADMIN — CLONIDINE HYDROCHLORIDE 0.1 MG: 0.1 TABLET ORAL at 08:39

## 2021-12-20 RX ADMIN — AMLODIPINE BESYLATE 2.5 MG: 2.5 TABLET ORAL at 08:38

## 2021-12-20 RX ADMIN — DULOXETINE HYDROCHLORIDE 90 MG: 60 CAPSULE, DELAYED RELEASE ORAL at 08:38

## 2021-12-20 RX ADMIN — ACETAMINOPHEN 650 MG: 325 TABLET ORAL at 08:37

## 2021-12-20 ASSESSMENT — PAIN DESCRIPTION - ONSET: ONSET: ON-GOING

## 2021-12-20 ASSESSMENT — PAIN - FUNCTIONAL ASSESSMENT: PAIN_FUNCTIONAL_ASSESSMENT: ACTIVITIES ARE NOT PREVENTED

## 2021-12-20 ASSESSMENT — PAIN DESCRIPTION - FREQUENCY: FREQUENCY: INTERMITTENT

## 2021-12-20 ASSESSMENT — PAIN DESCRIPTION - PROGRESSION: CLINICAL_PROGRESSION: NOT CHANGED

## 2021-12-20 ASSESSMENT — PAIN DESCRIPTION - PAIN TYPE: TYPE: CHRONIC PAIN

## 2021-12-20 ASSESSMENT — PAIN SCALES - GENERAL: PAINLEVEL_OUTOF10: 3

## 2021-12-20 ASSESSMENT — PAIN DESCRIPTION - ORIENTATION: ORIENTATION: OTHER (COMMENT)

## 2021-12-20 ASSESSMENT — PAIN DESCRIPTION - LOCATION: LOCATION: GENERALIZED

## 2021-12-20 ASSESSMENT — PAIN DESCRIPTION - DESCRIPTORS: DESCRIPTORS: ACHING

## 2021-12-20 NOTE — BH NOTE
[facility]  Discharge Note    Pt discharged with followings belongings:   Dental Appliances: None  Vision - Corrective Lenses: None  Hearing Aid: None  Jewelry: None  Clothing: Footwear,Shirt,Pants,Sweater,Socks,Undergarments (Comment)  Were All Patient Medications Collected?: Not Applicable  Other Valuables: Cell phone,Purse,Keys,Wallet (Cell phone in SAFE)   Valuables sent home with patient or returned to patient. Patient education on aftercare instructions: yes. Information faxed to [facility] by this writer  at 1:14 PM .Patient verbalize understanding of AVS:  yes. Status EXAM upon discharge:  Status and Exam  Normal: Yes  Facial Expression: Brightened  Affect: Appropriate,Congruent  Level of Consciousness: Alert  Mood:Normal: Yes  Mood: Depressed  Motor Activity:Normal: Yes  Interview Behavior: Cooperative  Preception: [place] to Person,[place] to Time,[place] to Place,[place] to Situation  Attention:Normal: Yes  Attention: Others(See comment) (wnl)  Thought Processes: Other(See comment) (linear, logical)  Thought Content:Normal: Yes  Thought Content: Other(See Comment) (wnl)  Hallucinations: None  Delusions: No  Delusions:  Other(See Comment) (none)  Memory:Normal: Yes  Insight and Judgment: No  Insight and Judgment: Poor Judgment,Poor Insight  Present Suicidal Ideation: No  Present Homicidal Ideation: No      Metabolic Screening:    Lab Results   Component Value Date    LABA1C 5.5 07/18/2021       Lab Results   Component Value Date    CHOL 227 (H) 07/18/2021    CHOL 199 11/28/2020    CHOL 203 (H) 02/02/2010     Lab Results   Component Value Date    TRIG 132 07/18/2021    TRIG 95 11/28/2020    TRIG 94 02/02/2010     Lab Results   Component Value Date    HDL 78 (H) 07/18/2021    HDL 73 (H) 11/28/2020    HDL 57 02/02/2010     No components found for: [facility]  Lab Results   Component Value Date    LABVLDL 26 07/18/2021    LABVLDL 19 11/28/2020    LABVLDL 19 02/02/2010       [address] [facility] [place] RN    Bridge Appointment completed: Reviewed Discharge Instructions with patient. Patient verbalizes understanding and agreement with the discharge plan using the teachback method.      Referral for Outpatient Tobacco Cessation Counseling, upon discharge (jacobo X if applicable and completed):    ( )  Hospital staff assisted patient to call Quit Line or faxed referral                                   during hospitalization                  (X)  Recognizing danger situations (included triggers and roadblocks), if not completed on admission                    (X)  Coping skills (new ways to manage stress, exercise, relaxation techniques, changing routine, distraction), if not completed on admission                                                           (X)  Basic information about quitting (benefits of quitting, techniques in how to quit, available resources, if not completed on admission  ( ) Referral for counseling faxed to [facility]   ( ) Patient refused referral  ( ) Patient refused counseling  ( ) Patient refused smoking cessation medication upon discharge    Vaccinations (jacobo X if applicable and completed):  ( ) Patient states already received influenza vaccine elsewhere  ( ) Patient received influenza vaccine during this hospitalization  (X) Patient refused influenza vaccine at this time
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
## 2021-12-20 NOTE — PLAN OF CARE
Patient was given tylenol 650 mg po, per request for complaint of body aches.  Cameron Dominguez R.N.

## 2021-12-20 NOTE — BH NOTE
Group Therapy Note    Date: 12/19/2021  Start Time: 20:00  End Time:  21:00  Number of Participants: 12    Type of Group: Recreational  Wrap up    Jose Angel Bahena Information  Module Name:  /  Session Number:  /    Patient's Goal:  D/c planning     Notes:  Continuing to work on goal    Status After Intervention:  Unchanged    Participation Level:  Active Listener and Interactive    Participation Quality: Appropriate, Attentive and Sharing      Speech:  pressured      Thought Process/Content: Logical      Affective Functioning: Exaggerated      Mood: anxious      Level of consciousness:  Alert, Oriented x4 and Attentive      Response to Learning: Able to change behavior      Endings: None Reported    Modes of Intervention: Socialization and Problem-solving      Discipline Responsible: Behavorial Health Tech      Signature:  Carrie Davis
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
## 2022-01-13 NOTE — BH NOTE
[facility]  Initial Interdisciplinary Treatment Plan NOTE    Review Date & Time: 7/18/21 0930    Patient was not in treatment team    Admission Type:   Admission Type: Voluntary    Reason for admission:  Reason for Admission: Self-harming behavior and unable to contract for safety        PLAN/TREATMENT RECOMMENDATIONS UPDATE:Develop plan of care, review medications, observe for treatable s/s. Patient will take medication as prescribed, eat 75% of meals, attend groups, participate in milieu activities, participate in treatment team and care planning for discharge and follow up.       Fiona Sorensen RN show

## 2022-01-25 ENCOUNTER — HOSPITAL ENCOUNTER (EMERGENCY)
Age: 57
Discharge: HOME | End: 2022-01-25
Attending: EMERGENCY MEDICINE

## 2022-01-25 ENCOUNTER — APPOINTMENT (OUTPATIENT)
Dept: GENERAL RADIOLOGY | Age: 57
End: 2022-01-25

## 2022-01-25 VITALS
TEMPERATURE: 98.2 F | BODY MASS INDEX: 29.06 KG/M2 | WEIGHT: 170.19 LBS | HEIGHT: 64 IN | RESPIRATION RATE: 18 BRPM | SYSTOLIC BLOOD PRESSURE: 144 MMHG | OXYGEN SATURATION: 99 % | DIASTOLIC BLOOD PRESSURE: 89 MMHG | HEART RATE: 89 BPM

## 2022-01-25 DIAGNOSIS — R05.9 COUGH: ICD-10-CM

## 2022-01-25 DIAGNOSIS — Z20.822 EXPOSURE TO COVID-19 VIRUS: Primary | ICD-10-CM

## 2022-01-25 DIAGNOSIS — R06.7 SNEEZING: ICD-10-CM

## 2022-01-25 LAB — SARS-COV-2: DETECTED

## 2022-01-25 PROCEDURE — U0003 INFECTIOUS AGENT DETECTION BY NUCLEIC ACID (DNA OR RNA); SEVERE ACUTE RESPIRATORY SYNDROME CORONAVIRUS 2 (SARS-COV-2) (CORONAVIRUS DISEASE [COVID-19]), AMPLIFIED PROBE TECHNIQUE, MAKING USE OF HIGH THROUGHPUT TECHNOLOGIES AS DESCRIBED BY CMS-2020-01-R: HCPCS

## 2022-01-25 PROCEDURE — 99283 EMERGENCY DEPT VISIT LOW MDM: CPT

## 2022-01-25 PROCEDURE — 94640 AIRWAY INHALATION TREATMENT: CPT

## 2022-01-25 PROCEDURE — 6370000000 HC RX 637 (ALT 250 FOR IP)

## 2022-01-25 PROCEDURE — 94761 N-INVAS EAR/PLS OXIMETRY MLT: CPT

## 2022-01-25 PROCEDURE — U0005 INFEC AGEN DETEC AMPLI PROBE: HCPCS

## 2022-01-25 PROCEDURE — 71046 X-RAY EXAM CHEST 2 VIEWS: CPT

## 2022-01-25 RX ORDER — ACETAMINOPHEN 325 MG/1
650 TABLET ORAL EVERY 6 HOURS PRN
Status: DISCONTINUED | OUTPATIENT
Start: 2022-01-25 | End: 2022-01-25 | Stop reason: HOSPADM

## 2022-01-25 RX ORDER — GUAIFENESIN/DEXTROMETHORPHAN 100-10MG/5
5 SYRUP ORAL 3 TIMES DAILY PRN
Qty: 120 ML | Refills: 0 | Status: SHIPPED | OUTPATIENT
Start: 2022-01-25 | End: 2022-02-04

## 2022-01-25 RX ORDER — IBUPROFEN 400 MG/1
400 TABLET ORAL EVERY 8 HOURS PRN
Qty: 20 TABLET | Refills: 0 | Status: SHIPPED | OUTPATIENT
Start: 2022-01-25

## 2022-01-25 RX ORDER — PREDNISONE 20 MG/1
40 TABLET ORAL DAILY
Qty: 5 TABLET | Refills: 0 | Status: SHIPPED | OUTPATIENT
Start: 2022-01-25 | End: 2022-01-28

## 2022-01-25 RX ORDER — ALBUTEROL SULFATE 90 UG/1
2 AEROSOL, METERED RESPIRATORY (INHALATION) ONCE
Status: COMPLETED | OUTPATIENT
Start: 2022-01-25 | End: 2022-01-25

## 2022-01-25 RX ORDER — GUAIFENESIN/DEXTROMETHORPHAN 100-10MG/5
5 SYRUP ORAL EVERY 4 HOURS PRN
Status: DISCONTINUED | OUTPATIENT
Start: 2022-01-25 | End: 2022-01-25 | Stop reason: HOSPADM

## 2022-01-25 RX ORDER — PREDNISONE 20 MG/1
60 TABLET ORAL ONCE
Status: COMPLETED | OUTPATIENT
Start: 2022-01-25 | End: 2022-01-25

## 2022-01-25 RX ORDER — ALBUTEROL SULFATE 90 UG/1
AEROSOL, METERED RESPIRATORY (INHALATION)
Qty: 18 G | Refills: 0 | Status: SHIPPED | OUTPATIENT
Start: 2022-01-25

## 2022-01-25 RX ORDER — ACETAMINOPHEN 650 MG/1
650 SUPPOSITORY RECTAL EVERY 6 HOURS PRN
Status: DISCONTINUED | OUTPATIENT
Start: 2022-01-25 | End: 2022-01-25 | Stop reason: HOSPADM

## 2022-01-25 RX ORDER — ACETAMINOPHEN 500 MG
500 TABLET ORAL 4 TIMES DAILY PRN
Qty: 40 TABLET | Refills: 0 | Status: SHIPPED | OUTPATIENT
Start: 2022-01-25

## 2022-01-25 RX ADMIN — PREDNISONE 60 MG: 20 TABLET ORAL at 10:39

## 2022-01-25 RX ADMIN — GUAIFENESIN AND DEXTROMETHORPHAN 5 ML: 100; 10 SYRUP ORAL at 08:51

## 2022-01-25 RX ADMIN — ACETAMINOPHEN 650 MG: 325 TABLET ORAL at 08:51

## 2022-01-25 RX ADMIN — Medication 2 PUFF: at 09:34

## 2022-01-25 ASSESSMENT — ENCOUNTER SYMPTOMS
ABDOMINAL PAIN: 0
TROUBLE SWALLOWING: 0
VOMITING: 0
EYE PAIN: 0
DIARRHEA: 0
BACK PAIN: 0
CONSTIPATION: 0
WHEEZING: 1
COUGH: 1
SORE THROAT: 1
NAUSEA: 0
RHINORRHEA: 1
SHORTNESS OF BREATH: 0

## 2022-01-25 ASSESSMENT — PAIN DESCRIPTION - PAIN TYPE
TYPE: ACUTE PAIN
TYPE: ACUTE PAIN

## 2022-01-25 ASSESSMENT — PAIN DESCRIPTION - PROGRESSION
CLINICAL_PROGRESSION: NOT CHANGED
CLINICAL_PROGRESSION: NOT CHANGED

## 2022-01-25 ASSESSMENT — PAIN SCALES - GENERAL
PAINLEVEL_OUTOF10: 5
PAINLEVEL_OUTOF10: 6
PAINLEVEL_OUTOF10: 8

## 2022-01-25 ASSESSMENT — PAIN DESCRIPTION - LOCATION
LOCATION: GENERALIZED
LOCATION: GENERALIZED

## 2022-01-25 ASSESSMENT — PAIN DESCRIPTION - ONSET
ONSET: ON-GOING
ONSET: ON-GOING

## 2022-01-25 ASSESSMENT — PAIN DESCRIPTION - DESCRIPTORS
DESCRIPTORS: ACHING
DESCRIPTORS: ACHING

## 2022-01-25 ASSESSMENT — PAIN DESCRIPTION - FREQUENCY
FREQUENCY: CONTINUOUS
FREQUENCY: CONTINUOUS

## 2022-01-25 ASSESSMENT — PAIN - FUNCTIONAL ASSESSMENT
PAIN_FUNCTIONAL_ASSESSMENT: PREVENTS OR INTERFERES SOME ACTIVE ACTIVITIES AND ADLS
PAIN_FUNCTIONAL_ASSESSMENT: ACTIVITIES ARE NOT PREVENTED

## 2022-01-25 NOTE — ED PROVIDER NOTES
I have personally performed a face to face diagnostic evaluation on this patient. I have fully participated in the care of this patient. I have reviewed and agree with all pertinent clinical information including history, physical exam, diagnostic tests, and the plan. HPI: Tiburcio Sears presented with cough. Symptoms for 5 days. Patient likely positive for Covid she has positive exposure. Patient also has history of potential asthma. Symptoms are constant. Associated with fevers and body aches. Per further history see SANDHYA note. Of note on my physical exam patient has already received breathing treatment. Chief Complaint   Patient presents with    Cough     onset of sx 5 days ago. cough, fever, body aches, runny nose. exposre o covid at work      Review of Systems: See SANDHYA note  Vital Signs: BP (!) 153/100   Pulse 90   Temp 98.4 °F (36.9 °C) (Oral)   Resp 16   Ht 5' 4\" (1.626 m)   Wt 170 lb 3.1 oz (77.2 kg)   LMP  (LMP Unknown)   SpO2 98%   BMI 29.21 kg/m²     Alert 55 y.o. female who does not appear toxic or acutely ill  HENT: Atraumatic, oral mucosa moist  Neck: Grossly normal ROM  Chest/Lungs: respiratory effort normal   Abdomen: Soft nontender  Extremities: 2+ radial bilaterally  Musculoskeletal: Grossly normal ROM  Skin: No palor or diaphoresis    Medical Decision Making and Plan:  Pertinent Labs & Imaging studies reviewed. (See SANDHYA chart for details)  I agree with assessment and plan. With clinical signs of reactive airway disease. Patient however has clear lungs at this time is saturating well on room air afebrile not tachycardic no signs of systemic sepsis. Covid test is pending. Given Covid instructions isolation precautions as well as prescription for steroids and albuterol. See SANDHYA note for further details. The patient is at low risk for mortality based on demographic, history and clinical factors.  Given the best available information and clinical assessment, I estimate the risk of hospitalization to be greater than risk of treatment at home. I have explained to the patient that the risk could rapidly change, given precautions for return and instructions. Explained to patient that the risk for mortality is low based on demographic, history and clinical factors. I discussed with patient the results of evaluation in the ED, diagnosis, care, and prognosis. The plan is to discharge to home. Patient is in agreement with plan and questions have been answered. I also discussed with patient the reasons which may require a return visit and the importance of follow-up care. The patient is well-appearing, nontoxic, and improved at the time of discharge. Patient agrees to call to arrange follow-up care as directed. Patient understands to return immediately for worsening/change in symptoms.           Mylene Simmonds, MD  01/25/22 0235

## 2022-01-25 NOTE — CARE COORDINATION
SW consult for RX assistance:  SW provided information regarding [address] for RX assistance and other services.

## 2022-01-25 NOTE — DISCHARGE INSTR - COC
Continuity of Care Form    Patient Name: Jonelle Araiza   :  1965  MRN:  [id number]    Admit date:  2022  Discharge date:  ***    Code Status Order: Prior   Advance Directives:      Admitting Physician:  No admitting provider for patient encounter. PCP: MITCHELL Hanson NP    Discharging Nurse: [facility] Unit/Room#: P51/X51-B  Discharging Unit Phone Number: ***    Emergency Contact:   Extended Emergency Contact Information  Primary Emergency Contact:   [place] [address] Phone: [phone]  Mobile Phone: [phone]  Relation: Other   needed?  No    Past Surgical History:  Past Surgical History:   Procedure Laterality Date    COLONOSCOPY      HYSTERECTOMY         Immunization History:   Immunization History   Administered Date(s) Administered    Influenza Virus Vaccine 2017    Tdap (Boostrix, Adacel) 2014, 2018, 12/10/2021       Active Problems:  Patient Active Problem List   Diagnosis Code    Suicidal ideation R45.851    Unspecified mood (affective) disorder ([address] F39    Generalized abdominal pain R10.84    Nausea R11.0    Alcohol abuse F10.10    Acute cystitis without hematuria N30.00    Vaginal candidiasis B37.3    Lower abdominal pain R10.30    Hypokalemia E87.6    Primary hypertension I10    Adnexal cyst N94.9    Alcohol use disorder, moderate, in controlled environment ([address] F10.20    Cannabis abuse F12.10    Cocaine use disorder, moderate, in early remission ([address] F14.21    Dental infection K04.7    Mild intermittent asthma without complication P39.53    Major depressive disorder, recurrent severe without psychotic features ([address] F33.2    Major depression, recurrent ([address] F33.9    Borderline personality disorder ([address] F60.3    Tobacco dependence F17.200    GERD without esophagitis K21.9       Isolation/Infection:   Isolation            No Isolation          Patient Infection Status       Infection Onset Added Last Indicated Last Indicated By Review Planned Expiration Resolved Resolved By    COVID-19 (Rule Out) 22 COVID-19 (Ordered) 22      Resolved    COVID-19 (Rule Out) 21 COVID-19 (Ordered)   21 Rule-Out Test Resulted    COVID-19 (Rule Out) 20 COVID-19 (Ordered)   20 Rule-Out Test Resulted            Nurse Assessment:  Last Vital Signs: BP (!) 153/100   Pulse 90   Temp 98.4 °F (36.9 °C) (Oral)   Resp 16   Ht 5' 4\" (1.626 m)   Wt 170 lb 3.1 oz (77.2 kg)   LMP  (LMP Unknown)   SpO2 98%   BMI 29.21 kg/m²     Last documented pain score (0-10 scale): Pain Level: 6  Last Weight:   Wt Readings from Last 1 Encounters:   22 170 lb 3.1 oz (77.2 kg)     Mental Status:  {IP PT MENTAL STATUS:}    IV Access:  { HADLEY IV ACCESS:632571428}    Nursing Mobility/ADLs:  Walking   {CHP DME LSY}  Transfer  {CHP DME YTDS:812632999}  Bathing  {CHP DME WVHZ:338184637}  Dressing  {CHP DME RHAW:427026533}  Toileting  {CHP DME LHMA:579627255}  Feeding  {CHP DME DINORA:834908196}  Med Admin  {CHP DME SRLR:638573840}  Med Delivery   { HADLEY MED Delivery:081808640}    Wound Care Documentation and Therapy:        Elimination:  Continence: Bowel: {YES / TS:25874}  Bladder: {YES / BV:81055}  Urinary Catheter: {Urinary Catheter:896593836}   Colostomy/Ileostomy/Ileal Conduit: {YES / NF:80012}       Date of Last BM: ***  No intake or output data in the 24 hours ending 22 1036  No intake/output data recorded.     Safety Concerns:     [address] Safety Concerns:959964767}    Impairments/Disabilities:      [address] Impairments/Disabilities:347341901}    Nutrition Therapy:  Current Nutrition Therapy:   [address] Ram[address] BUTCHER Diet List:887493239}    Routes of Feeding: {CHP DME Other Feedings:377821231}  Liquids: {Slp liquid thickness:73635}  Daily Fluid Restriction: {CHP DME Yes amt example:973679951}  Last Modified Barium Swallow with Video (Video Swallowing Test): {Done Not Done [facility]:071359555}    Treatments at the Time of Hospital Discharge:   Respiratory Treatments: ***  Oxygen Therapy:  {Therapy; copd oxygen:35161}  Ventilator:    {[facility] Vent AEZX:649802052}    Rehab Therapies: {THERAPEUTIC INTERVENTION:6306753054}  Weight Bearing Status/Restrictions: {[facility] Weight Bearin}  Other Medical Equipment (for information only, NOT a DME order):  {EQUIPMENT:237557226}  Other Treatments: ***    Patient's personal belongings (please select all that are sent with patient):  {[facility] DME Belongings:235618542}    RN SIGNATURE:  {Esignature:370956150}    CASE MANAGEMENT/SOCIAL WORK SECTION    Inpatient Status Date: ***    Readmission Risk Assessment Score:  Readmission Risk              Risk of Unplanned Readmission:  0           Discharging to Facility/ Agency   Name:   Address:  Phone:  Fax:    Dialysis Facility (if applicable)   Name:  Address:  Dialysis Schedule:  Phone:  Fax:    / signature: {Esignature:474224505}    PHYSICIAN SECTION    Prognosis: {Prognosis:8561140206}    Condition at Discharge: [address] [place] [address] Patient Condition:166735617}    Rehab Potential (if transferring to Rehab): {Prognosis:2758576339}    Recommended Labs or Other Treatments After Discharge: ***    Physician Certification: I certify the above information and transfer of Job Drain  is necessary for the continuing treatment of the diagnosis listed and that she requires {Admit to Appropriate Level of Care:59257} for {GREATER/LESS:431822039} 30 days.      Update Admission H&P: {CHP DME Changes in PFW:370045618}    PHYSICIAN SIGNATURE:  {Esignature:713616805}

## 2022-01-25 NOTE — Clinical Note
Jitendra Bazan was seen and treated in our emergency department on 1/25/2022. She may return to work on 02/04/2022. If you have any questions or concerns, please don't hesitate to call.       Foreign Lee RN

## 2022-01-26 ENCOUNTER — CARE COORDINATION (OUTPATIENT)
Dept: CASE MANAGEMENT | Age: 57
End: 2022-01-26

## 2022-01-27 NOTE — RESULT ENCOUNTER NOTE
The patient was called for notification of a POSITIVE test result for COVID-19. The following information was given to the patient:    The COVID-19 test result was positive  Treatment of coronavirus does not require an antibiotic  Remain isolated for 10 days since symptoms first appeared AND At least 3 days have passed after recovery (Recovery is defined as resolution of fever without the use of fever-reducing medications with progressive improvement or resolution of other symptoms). Wash hands often with soap and water for at least 20 seconds or alternatively use hand  with at least 60% alcohol content  Cover coughs and sneezes  Wear a mask when around others if possible  Clean all \"high-touch\" surfaces every day, such as doorknobs and cellphones  Continually monitor symptoms. Contact a medical provider if symptoms are worsening, such as difficulty breathing. For additional information, please visit the Centers for Disease Control and Prevention ([email or web address].

## 2022-01-28 ENCOUNTER — CARE COORDINATION (OUTPATIENT)
Dept: CASE MANAGEMENT | Age: 57
End: 2022-01-28

## 2022-01-28 NOTE — CARE COORDINATION
Ambulatory Care Coordination ED COVID Follow up Call    Challenges to be reviewed by the provider   Additional needs identified to be addressed with provider: No  none                 Encounter was not routed to provider for escalation. Method of communication with provider: none    Discussed COVID-19 related testing which was: available at this time. Test results were: positive. Patient informed of results, if available? Yes. Current Symptoms: fatigue, cough, loss of taste or smell, sweating, diarrhea and no worsening symptoms   Patient reports she is starting to feel a little better. Reviewed New or Changed Meds: yes    Do you have what you need at home?  Durable Medical Equipment ordered at discharge: None   Home Health/Outpatient orders at discharge: none   Was patient discharged with a pulse oximeter? No Discussed and confirmed pulse oximeter discharge instructions and when to notify provider or seek emergency care. Patient education provided: Reviewed appropriate site of care based on symptoms and resources available to patient including: PCP and When to call 911. Follow up appointment recommended: yes. If no appointment scheduled, scheduling offered: Patient scheduled f/u appointment with PCP next week. She also reports she has f/u with her counselor at [facility]  No future appointments. Interventions: Obtained and reviewed discharge summary and/or continuity of care documents  Education of patient/family/caregiver/guardian to support self-management-. Reviewed discharge instructions, medical action plan and red flags with patient who verbalized understanding. Plan for follow-up call in 3-5 days based on severity of symptoms and risk factors. Plan for next call: symptom management--  Provided contact information for future needs.     Prince Salazar RN

## 2022-02-01 ENCOUNTER — CARE COORDINATION (OUTPATIENT)
Dept: CASE MANAGEMENT | Age: 57
End: 2022-02-01

## 2022-02-01 NOTE — CARE COORDINATION
Attempted to contact patient for final ED f/u call; left HIPPA compliant message and ACM contact information

## 2022-02-18 ENCOUNTER — HOSPITAL ENCOUNTER (EMERGENCY)
Age: 57
Discharge: HOME | End: 2022-02-18
Attending: EMERGENCY MEDICINE

## 2022-02-18 VITALS
OXYGEN SATURATION: 95 % | RESPIRATION RATE: 16 BRPM | HEART RATE: 65 BPM | SYSTOLIC BLOOD PRESSURE: 128 MMHG | DIASTOLIC BLOOD PRESSURE: 61 MMHG | TEMPERATURE: 97.9 F

## 2022-02-18 DIAGNOSIS — F32.A DEPRESSION, UNSPECIFIED DEPRESSION TYPE: ICD-10-CM

## 2022-02-18 DIAGNOSIS — F10.921 ACUTE ALCOHOLIC INTOXICATION WITH DELIRIUM (HCC): Primary | ICD-10-CM

## 2022-02-18 LAB
A/G RATIO: 1.5 (ref 1.1–2.2)
ACETAMINOPHEN LEVEL: <5 UG/ML (ref 10–30)
ALBUMIN SERPL-MCNC: 4.4 G/DL (ref 3.4–5)
ALP BLD-CCNC: 106 U/L (ref 40–129)
ALT SERPL-CCNC: 29 U/L (ref 10–40)
AMPHETAMINE SCREEN, URINE: ABNORMAL
ANION GAP SERPL CALCULATED.3IONS-SCNC: 14 MMOL/L (ref 3–16)
AST SERPL-CCNC: 25 U/L (ref 15–37)
BACTERIA: ABNORMAL /HPF
BARBITURATE SCREEN URINE: ABNORMAL
BASOPHILS ABSOLUTE: 0.1 K/UL (ref 0–0.2)
BASOPHILS RELATIVE PERCENT: 1.1 %
BENZODIAZEPINE SCREEN, URINE: ABNORMAL
BILIRUB SERPL-MCNC: <0.2 MG/DL (ref 0–1)
BILIRUBIN URINE: NEGATIVE
BLOOD, URINE: NEGATIVE
BUN BLDV-MCNC: 10 MG/DL (ref 7–20)
CALCIUM SERPL-MCNC: 9.7 MG/DL (ref 8.3–10.6)
CANNABINOID SCREEN URINE: POSITIVE
CHLORIDE BLD-SCNC: 105 MMOL/L (ref 99–110)
CLARITY: CLEAR
CO2: 21 MMOL/L (ref 21–32)
COCAINE METABOLITE SCREEN URINE: ABNORMAL
COLOR: YELLOW
CREAT SERPL-MCNC: 0.9 MG/DL (ref 0.6–1.1)
EOSINOPHILS ABSOLUTE: 0.3 K/UL (ref 0–0.6)
EOSINOPHILS RELATIVE PERCENT: 4.4 %
EPITHELIAL CELLS, UA: 7 /HPF (ref 0–5)
ETHANOL: 84 MG/DL (ref 0–0.08)
GFR AFRICAN AMERICAN: >60
GFR NON-AFRICAN AMERICAN: >60
GLUCOSE BLD-MCNC: 96 MG/DL (ref 70–99)
GLUCOSE URINE: NEGATIVE MG/DL
HCG QUALITATIVE: NEGATIVE
HCT VFR BLD CALC: 41.5 % (ref 36–48)
HEMOGLOBIN: 14.3 G/DL (ref 12–16)
HYALINE CASTS: 1 /LPF (ref 0–8)
KETONES, URINE: NEGATIVE MG/DL
LEUKOCYTE ESTERASE, URINE: NEGATIVE
LIPASE: 30 U/L (ref 13–60)
LYMPHOCYTES ABSOLUTE: 2.8 K/UL (ref 1–5.1)
LYMPHOCYTES RELATIVE PERCENT: 43.6 %
Lab: ABNORMAL
MCH RBC QN AUTO: 35 PG (ref 26–34)
MCHC RBC AUTO-ENTMCNC: 34.5 G/DL (ref 31–36)
MCV RBC AUTO: 101.6 FL (ref 80–100)
METHADONE SCREEN, URINE: ABNORMAL
MICROSCOPIC EXAMINATION: ABNORMAL
MONOCYTES ABSOLUTE: 0.3 K/UL (ref 0–1.3)
MONOCYTES RELATIVE PERCENT: 4.6 %
NEUTROPHILS ABSOLUTE: 3 K/UL (ref 1.7–7.7)
NEUTROPHILS RELATIVE PERCENT: 46.3 %
NITRITE, URINE: NEGATIVE
OPIATE SCREEN URINE: ABNORMAL
OXYCODONE URINE: POSITIVE
PDW BLD-RTO: 13.1 % (ref 12.4–15.4)
PH UA: 5.5
PH UA: 5.5 (ref 5–8)
PHENCYCLIDINE SCREEN URINE: ABNORMAL
PLATELET # BLD: 277 K/UL (ref 135–450)
PMV BLD AUTO: 8.4 FL (ref 5–10.5)
POTASSIUM REFLEX MAGNESIUM: 4.1 MMOL/L (ref 3.5–5.1)
PROPOXYPHENE SCREEN: ABNORMAL
PROTEIN UA: NEGATIVE MG/DL
RBC # BLD: 4.08 M/UL (ref 4–5.2)
RBC UA: 1 /HPF (ref 0–4)
SALICYLATE, SERUM: <0.3 MG/DL (ref 15–30)
SODIUM BLD-SCNC: 140 MMOL/L (ref 136–145)
SPECIFIC GRAVITY UA: 1.02 (ref 1–1.03)
TOTAL PROTEIN: 7.3 G/DL (ref 6.4–8.2)
URINE TYPE: ABNORMAL
UROBILINOGEN, URINE: 0.2 E.U./DL
WBC # BLD: 6.5 K/UL (ref 4–11)
WBC UA: 6 /HPF (ref 0–5)

## 2022-02-18 PROCEDURE — 99285 EMERGENCY DEPT VISIT HI MDM: CPT

## 2022-02-18 PROCEDURE — 96360 HYDRATION IV INFUSION INIT: CPT

## 2022-02-18 PROCEDURE — 83690 ASSAY OF LIPASE: CPT

## 2022-02-18 PROCEDURE — 2580000003 HC RX 258: Performed by: EMERGENCY MEDICINE

## 2022-02-18 PROCEDURE — 80053 COMPREHEN METABOLIC PANEL: CPT

## 2022-02-18 PROCEDURE — 80307 DRUG TEST PRSMV CHEM ANLYZR: CPT

## 2022-02-18 PROCEDURE — 82077 ASSAY SPEC XCP UR&BREATH IA: CPT

## 2022-02-18 PROCEDURE — 80143 DRUG ASSAY ACETAMINOPHEN: CPT

## 2022-02-18 PROCEDURE — 80179 DRUG ASSAY SALICYLATE: CPT

## 2022-02-18 PROCEDURE — 87086 URINE CULTURE/COLONY COUNT: CPT

## 2022-02-18 PROCEDURE — 81001 URINALYSIS AUTO W/SCOPE: CPT

## 2022-02-18 PROCEDURE — 84703 CHORIONIC GONADOTROPIN ASSAY: CPT

## 2022-02-18 PROCEDURE — 85025 COMPLETE CBC W/AUTO DIFF WBC: CPT

## 2022-02-18 RX ORDER — 0.9 % SODIUM CHLORIDE 0.9 %
1000 INTRAVENOUS SOLUTION INTRAVENOUS ONCE
Status: COMPLETED | OUTPATIENT
Start: 2022-02-18 | End: 2022-02-18

## 2022-02-18 RX ORDER — DULOXETIN HYDROCHLORIDE 30 MG/1
90 CAPSULE, DELAYED RELEASE ORAL DAILY
Qty: 30 CAPSULE | Refills: 0 | Status: ON HOLD | OUTPATIENT
Start: 2022-02-18 | End: 2022-03-24 | Stop reason: HOSPADM

## 2022-02-18 RX ADMIN — SODIUM CHLORIDE 1000 ML: 9 INJECTION, SOLUTION INTRAVENOUS at 17:25
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
## 2022-02-18 NOTE — ED NOTES
Pt alert and oriented x 4 for this RN and admits that Pt was drinking and using other substances before she came to the ER today. Denies any thoughts of suicide and talked about future plans as far as living situation and life. Pt denied any thoughts of harming herself and denied access to any lethal means of harming herself. Pt admitted that she felt that she would be safe to go home and a lyft was arranged for her through the clinical .        Terri Mane RN  02/18/22 7793

## 2022-02-18 NOTE — ED NOTES
Bed: 28  Expected date:   Expected time:   Means of arrival:   Comments:  [facility], RN  02/18/22 8058

## 2022-02-19 LAB — URINE CULTURE, ROUTINE: NORMAL

## 2022-03-20 ENCOUNTER — HOSPITAL ENCOUNTER (EMERGENCY)
Age: 57
Discharge: PSYCHIATRIC OR SUBSTANCE USE FACILITY | End: 2022-03-21
Attending: EMERGENCY MEDICINE

## 2022-03-20 DIAGNOSIS — R45.851 SUICIDAL IDEATION: Primary | ICD-10-CM

## 2022-03-20 LAB
A/G RATIO: 1.7 (ref 1.1–2.2)
ACETAMINOPHEN LEVEL: <5 UG/ML (ref 10–30)
ALBUMIN SERPL-MCNC: 4.4 G/DL (ref 3.4–5)
ALP BLD-CCNC: 113 U/L (ref 40–129)
ALT SERPL-CCNC: 25 U/L (ref 10–40)
AMPHETAMINE SCREEN, URINE: ABNORMAL
ANION GAP SERPL CALCULATED.3IONS-SCNC: 14 MMOL/L (ref 3–16)
AST SERPL-CCNC: 26 U/L (ref 15–37)
BARBITURATE SCREEN URINE: ABNORMAL
BASOPHILS ABSOLUTE: 0.1 K/UL (ref 0–0.2)
BASOPHILS RELATIVE PERCENT: 1.1 %
BENZODIAZEPINE SCREEN, URINE: ABNORMAL
BILIRUB SERPL-MCNC: 0.3 MG/DL (ref 0–1)
BILIRUBIN URINE: NEGATIVE
BLOOD, URINE: NEGATIVE
BUN BLDV-MCNC: 11 MG/DL (ref 7–20)
CALCIUM SERPL-MCNC: 10.1 MG/DL (ref 8.3–10.6)
CANNABINOID SCREEN URINE: POSITIVE
CHLORIDE BLD-SCNC: 104 MMOL/L (ref 99–110)
CLARITY: CLEAR
CO2: 23 MMOL/L (ref 21–32)
COCAINE METABOLITE SCREEN URINE: ABNORMAL
COLOR: YELLOW
CREAT SERPL-MCNC: 0.7 MG/DL (ref 0.6–1.1)
EOSINOPHILS ABSOLUTE: 0.2 K/UL (ref 0–0.6)
EOSINOPHILS RELATIVE PERCENT: 4.3 %
ETHANOL: 105 MG/DL (ref 0–0.08)
ETHANOL: 166 MG/DL (ref 0–0.08)
ETHANOL: 50 MG/DL (ref 0–0.08)
GFR AFRICAN AMERICAN: >60
GFR NON-AFRICAN AMERICAN: >60
GLUCOSE BLD-MCNC: 105 MG/DL (ref 70–99)
GLUCOSE URINE: NEGATIVE MG/DL
HCG QUALITATIVE: NEGATIVE
HCT VFR BLD CALC: 40.1 % (ref 36–48)
HEMOGLOBIN: 13.8 G/DL (ref 12–16)
KETONES, URINE: NEGATIVE MG/DL
LEUKOCYTE ESTERASE, URINE: NEGATIVE
LYMPHOCYTES ABSOLUTE: 2.3 K/UL (ref 1–5.1)
LYMPHOCYTES RELATIVE PERCENT: 41.2 %
Lab: ABNORMAL
MCH RBC QN AUTO: 34.5 PG (ref 26–34)
MCHC RBC AUTO-ENTMCNC: 34.3 G/DL (ref 31–36)
MCV RBC AUTO: 100.5 FL (ref 80–100)
METHADONE SCREEN, URINE: ABNORMAL
MICROSCOPIC EXAMINATION: NORMAL
MONOCYTES ABSOLUTE: 0.3 K/UL (ref 0–1.3)
MONOCYTES RELATIVE PERCENT: 5.4 %
NEUTROPHILS ABSOLUTE: 2.7 K/UL (ref 1.7–7.7)
NEUTROPHILS RELATIVE PERCENT: 48 %
NITRITE, URINE: NEGATIVE
OPIATE SCREEN URINE: ABNORMAL
OXYCODONE URINE: ABNORMAL
PDW BLD-RTO: 13.2 % (ref 12.4–15.4)
PH UA: 6
PH UA: 6 (ref 5–8)
PHENCYCLIDINE SCREEN URINE: ABNORMAL
PLATELET # BLD: 350 K/UL (ref 135–450)
PMV BLD AUTO: 7.4 FL (ref 5–10.5)
POTASSIUM REFLEX MAGNESIUM: 4.4 MMOL/L (ref 3.5–5.1)
PROPOXYPHENE SCREEN: ABNORMAL
PROTEIN UA: NEGATIVE MG/DL
RBC # BLD: 3.99 M/UL (ref 4–5.2)
SALICYLATE, SERUM: <0.3 MG/DL (ref 15–30)
SODIUM BLD-SCNC: 141 MMOL/L (ref 136–145)
SPECIFIC GRAVITY UA: 1.01 (ref 1–1.03)
TOTAL PROTEIN: 7 G/DL (ref 6.4–8.2)
URINE REFLEX TO CULTURE: NORMAL
URINE TYPE: NORMAL
UROBILINOGEN, URINE: 0.2 E.U./DL
WBC # BLD: 5.7 K/UL (ref 4–11)

## 2022-03-20 PROCEDURE — 82077 ASSAY SPEC XCP UR&BREATH IA: CPT

## 2022-03-20 PROCEDURE — 80143 DRUG ASSAY ACETAMINOPHEN: CPT

## 2022-03-20 PROCEDURE — 84703 CHORIONIC GONADOTROPIN ASSAY: CPT

## 2022-03-20 PROCEDURE — 85025 COMPLETE CBC W/AUTO DIFF WBC: CPT

## 2022-03-20 PROCEDURE — 80307 DRUG TEST PRSMV CHEM ANLYZR: CPT

## 2022-03-20 PROCEDURE — 99285 EMERGENCY DEPT VISIT HI MDM: CPT

## 2022-03-20 PROCEDURE — 6370000000 HC RX 637 (ALT 250 FOR IP): Performed by: EMERGENCY MEDICINE

## 2022-03-20 PROCEDURE — 36415 COLL VENOUS BLD VENIPUNCTURE: CPT

## 2022-03-20 PROCEDURE — 80053 COMPREHEN METABOLIC PANEL: CPT

## 2022-03-20 PROCEDURE — 81003 URINALYSIS AUTO W/O SCOPE: CPT

## 2022-03-20 PROCEDURE — 80179 DRUG ASSAY SALICYLATE: CPT

## 2022-03-20 RX ORDER — ONDANSETRON 4 MG/1
8 TABLET, ORALLY DISINTEGRATING ORAL ONCE
Status: COMPLETED | OUTPATIENT
Start: 2022-03-20 | End: 2022-03-20

## 2022-03-20 RX ADMIN — ONDANSETRON 8 MG: 4 TABLET, ORALLY DISINTEGRATING ORAL at 22:19

## 2022-03-20 NOTE — ED NOTES
Patient stated that she has an appointment with [facility] on Thursday at 12p. She was recently assigned a , Dominga Morataya [phone]. Patient has my contact information for further assistance out in the community.           Suzzanne Alpers  03/20/22 9018

## 2022-03-20 NOTE — ED TRIAGE NOTES
Pt states she woke up this morning hearing voices to self harm, has plan of taking pills or cutting but has not attempted, said she drank a pint of bouron and smoked some weed to try to get the voices to go away,she has HX of SI,

## 2022-03-21 ENCOUNTER — HOSPITAL ENCOUNTER (INPATIENT)
Age: 57
LOS: 3 days | Discharge: HOME | DRG: 882 | End: 2022-03-24
Attending: PSYCHIATRY & NEUROLOGY | Admitting: PSYCHIATRY & NEUROLOGY

## 2022-03-21 VITALS
TEMPERATURE: 97.7 F | BODY MASS INDEX: 29.87 KG/M2 | OXYGEN SATURATION: 98 % | RESPIRATION RATE: 16 BRPM | WEIGHT: 174 LBS | HEART RATE: 67 BPM | SYSTOLIC BLOOD PRESSURE: 165 MMHG | DIASTOLIC BLOOD PRESSURE: 101 MMHG

## 2022-03-21 PROBLEM — F32.A DEPRESSIVE DISORDER: Status: ACTIVE | Noted: 2022-03-21

## 2022-03-21 LAB
SARS-COV-2, NAAT: NOT DETECTED
TSH SERPL DL<=0.05 MIU/L-ACNC: 2.68 UIU/ML (ref 0.27–4.2)

## 2022-03-21 PROCEDURE — 6370000000 HC RX 637 (ALT 250 FOR IP): Performed by: EMERGENCY MEDICINE

## 2022-03-21 PROCEDURE — 87635 SARS-COV-2 COVID-19 AMP PRB: CPT

## 2022-03-21 PROCEDURE — 82607 VITAMIN B-12: CPT

## 2022-03-21 PROCEDURE — 82746 ASSAY OF FOLIC ACID SERUM: CPT

## 2022-03-21 PROCEDURE — 86803 HEPATITIS C AB TEST: CPT

## 2022-03-21 PROCEDURE — 84443 ASSAY THYROID STIM HORMONE: CPT

## 2022-03-21 PROCEDURE — 36415 COLL VENOUS BLD VENIPUNCTURE: CPT

## 2022-03-21 PROCEDURE — 6370000000 HC RX 637 (ALT 250 FOR IP): Performed by: PSYCHIATRY & NEUROLOGY

## 2022-03-21 PROCEDURE — 1240000000 HC EMOTIONAL WELLNESS R&B

## 2022-03-21 RX ORDER — MAGNESIUM HYDROXIDE/ALUMINUM HYDROXICE/SIMETHICONE 120; 1200; 1200 MG/30ML; MG/30ML; MG/30ML
30 SUSPENSION ORAL EVERY 6 HOURS PRN
Status: DISCONTINUED | OUTPATIENT
Start: 2022-03-21 | End: 2022-03-24 | Stop reason: HOSPADM

## 2022-03-21 RX ORDER — LORAZEPAM 1 MG/1
1 TABLET ORAL
Status: DISCONTINUED | OUTPATIENT
Start: 2022-03-21 | End: 2022-03-24 | Stop reason: HOSPADM

## 2022-03-21 RX ORDER — OLANZAPINE 10 MG/1
10 TABLET ORAL EVERY 8 HOURS PRN
Status: DISCONTINUED | OUTPATIENT
Start: 2022-03-21 | End: 2022-03-24 | Stop reason: HOSPADM

## 2022-03-21 RX ORDER — LORAZEPAM 2 MG/ML
1 INJECTION INTRAMUSCULAR
Status: DISCONTINUED | OUTPATIENT
Start: 2022-03-21 | End: 2022-03-24 | Stop reason: HOSPADM

## 2022-03-21 RX ORDER — LORAZEPAM 1 MG/1
1 TABLET ORAL
Status: DISCONTINUED | OUTPATIENT
Start: 2022-03-21 | End: 2022-03-21

## 2022-03-21 RX ORDER — LANOLIN ALCOHOL/MO/W.PET/CERES
100 CREAM (GRAM) TOPICAL DAILY
Status: DISCONTINUED | OUTPATIENT
Start: 2022-03-21 | End: 2022-03-24 | Stop reason: HOSPADM

## 2022-03-21 RX ORDER — LORAZEPAM 2 MG/1
2 TABLET ORAL
Status: DISCONTINUED | OUTPATIENT
Start: 2022-03-21 | End: 2022-03-24 | Stop reason: HOSPADM

## 2022-03-21 RX ORDER — ACETAMINOPHEN 325 MG/1
650 TABLET ORAL EVERY 4 HOURS PRN
Status: DISCONTINUED | OUTPATIENT
Start: 2022-03-21 | End: 2022-03-24 | Stop reason: HOSPADM

## 2022-03-21 RX ORDER — LORAZEPAM 2 MG/ML
1 INJECTION INTRAMUSCULAR
Status: DISCONTINUED | OUTPATIENT
Start: 2022-03-21 | End: 2022-03-21

## 2022-03-21 RX ORDER — TRAZODONE HYDROCHLORIDE 50 MG/1
50 TABLET ORAL NIGHTLY PRN
Status: DISCONTINUED | OUTPATIENT
Start: 2022-03-21 | End: 2022-03-23

## 2022-03-21 RX ORDER — LORAZEPAM 2 MG/ML
4 INJECTION INTRAMUSCULAR
Status: DISCONTINUED | OUTPATIENT
Start: 2022-03-21 | End: 2022-03-21

## 2022-03-21 RX ORDER — LORAZEPAM 2 MG/ML
2 INJECTION INTRAMUSCULAR
Status: DISCONTINUED | OUTPATIENT
Start: 2022-03-21 | End: 2022-03-21

## 2022-03-21 RX ORDER — LORAZEPAM 2 MG/ML
3 INJECTION INTRAMUSCULAR
Status: DISCONTINUED | OUTPATIENT
Start: 2022-03-21 | End: 2022-03-24 | Stop reason: HOSPADM

## 2022-03-21 RX ORDER — HYDROXYZINE 50 MG/1
50 TABLET, FILM COATED ORAL 3 TIMES DAILY PRN
Status: DISCONTINUED | OUTPATIENT
Start: 2022-03-21 | End: 2022-03-24 | Stop reason: HOSPADM

## 2022-03-21 RX ORDER — DIPHENHYDRAMINE HYDROCHLORIDE 50 MG/ML
50 INJECTION INTRAMUSCULAR; INTRAVENOUS EVERY 4 HOURS PRN
Status: DISCONTINUED | OUTPATIENT
Start: 2022-03-21 | End: 2022-03-24 | Stop reason: HOSPADM

## 2022-03-21 RX ORDER — LORAZEPAM 1 MG/1
1 TABLET ORAL EVERY 4 HOURS PRN
Status: DISCONTINUED | OUTPATIENT
Start: 2022-03-21 | End: 2022-03-21 | Stop reason: HOSPADM

## 2022-03-21 RX ORDER — LORAZEPAM 2 MG/1
4 TABLET ORAL
Status: DISCONTINUED | OUTPATIENT
Start: 2022-03-21 | End: 2022-03-24 | Stop reason: HOSPADM

## 2022-03-21 RX ORDER — LORAZEPAM 2 MG/ML
2 INJECTION INTRAMUSCULAR
Status: DISCONTINUED | OUTPATIENT
Start: 2022-03-21 | End: 2022-03-24 | Stop reason: HOSPADM

## 2022-03-21 RX ORDER — LORAZEPAM 2 MG/ML
4 INJECTION INTRAMUSCULAR
Status: DISCONTINUED | OUTPATIENT
Start: 2022-03-21 | End: 2022-03-24 | Stop reason: HOSPADM

## 2022-03-21 RX ORDER — IBUPROFEN 400 MG/1
400 TABLET ORAL EVERY 6 HOURS PRN
Status: DISCONTINUED | OUTPATIENT
Start: 2022-03-21 | End: 2022-03-24 | Stop reason: HOSPADM

## 2022-03-21 RX ORDER — POLYETHYLENE GLYCOL 3350 17 G
2 POWDER IN PACKET (EA) ORAL
Status: DISCONTINUED | OUTPATIENT
Start: 2022-03-21 | End: 2022-03-24 | Stop reason: HOSPADM

## 2022-03-21 RX ORDER — LORAZEPAM 2 MG/1
4 TABLET ORAL
Status: DISCONTINUED | OUTPATIENT
Start: 2022-03-21 | End: 2022-03-21

## 2022-03-21 RX ORDER — LORAZEPAM 2 MG/ML
3 INJECTION INTRAMUSCULAR
Status: DISCONTINUED | OUTPATIENT
Start: 2022-03-21 | End: 2022-03-21

## 2022-03-21 RX ORDER — LORAZEPAM 2 MG/1
2 TABLET ORAL
Status: DISCONTINUED | OUTPATIENT
Start: 2022-03-21 | End: 2022-03-21

## 2022-03-21 RX ORDER — M-VIT,TX,IRON,MINS/CALC/FOLIC 27MG-0.4MG
1 TABLET ORAL DAILY
Status: DISCONTINUED | OUTPATIENT
Start: 2022-03-21 | End: 2022-03-24 | Stop reason: HOSPADM

## 2022-03-21 RX ADMIN — LORAZEPAM 1 MG: 1 TABLET ORAL at 09:37

## 2022-03-21 RX ADMIN — Medication 100 MG: at 18:27

## 2022-03-21 RX ADMIN — LORAZEPAM 1 MG: 1 TABLET ORAL at 06:26

## 2022-03-21 RX ADMIN — Medication 1 TABLET: at 17:47

## 2022-03-21 RX ADMIN — LORAZEPAM 1 MG: 1 TABLET ORAL at 17:47

## 2022-03-21 ASSESSMENT — SLEEP AND FATIGUE QUESTIONNAIRES
DIFFICULTY ARISING: YES
DO YOU HAVE DIFFICULTY SLEEPING: YES
AVERAGE NUMBER OF SLEEP HOURS: 5
DIFFICULTY STAYING ASLEEP: YES
SLEEP PATTERN: DIFFICULTY FALLING ASLEEP;DISTURBED/INTERRUPTED SLEEP;RESTLESSNESS
RESTFUL SLEEP: YES
DO YOU USE A SLEEP AID: YES
DIFFICULTY FALLING ASLEEP: YES

## 2022-03-21 ASSESSMENT — LIFESTYLE VARIABLES
HISTORY_ALCOHOL_USE: YES
HISTORY_ALCOHOL_USE: YES

## 2022-03-21 ASSESSMENT — PATIENT HEALTH QUESTIONNAIRE - PHQ9
SUM OF ALL RESPONSES TO PHQ QUESTIONS 1-9: 19
SUM OF ALL RESPONSES TO PHQ QUESTIONS 1-9: 19

## 2022-03-21 ASSESSMENT — PAIN SCALES - GENERAL: PAINLEVEL_OUTOF10: 0

## 2022-03-21 NOTE — ED NOTES
Yesterday 3/20/22, patient gave me permission to call  on her behalf. Call was made to patient's , Fani Amos at [phone].  was made aware that patient would be transferring to [facility]. Fani Amos stated that she will follow up with patient's care.       Lisa Spring  03/21/22 9202

## 2022-03-21 NOTE — ED NOTES
Environment remains safe for pt. Pt resting comfortably in bed. Sitter at bedside. No distress or needs noted at this time.      Belen Abdi RN  03/21/22 3128

## 2022-03-21 NOTE — ED NOTES
Environment remains safe for pt. Pt resting comfortably in bed. Sitter at bedside. No distress or needs noted at this time.      Remedios Lopez RN  03/21/22 9529

## 2022-03-21 NOTE — ED NOTES
Environment remains safe for pt. Pt resting comfortably in bed. Sitter at bedside. No distress or needs noted at this time.      Vesna Barnes RN  03/21/22 2417

## 2022-03-21 NOTE — ED NOTES
Environment remains safe for pt. Pt resting comfortably in bed. Sitter at bedside. No distress or needs noted at this time.      Roxana Walker) Joelle Williamson RN  03/21/22 5018

## 2022-03-21 NOTE — ED PROVIDER NOTES
[address] [facility] PROVIDER NOTE    Patient Identification  Pt Name: Evy Sherman  MRN: [id number]  Arunatrongfjonah 1965  Date of evaluation: 3/20/2022  Provider: Maddie Bay MD  PCP: MITCHELL Marti - SHERRI    Chief Complaint  Suicidal (Pt states she woke up this morning hearing voices to self harm, has plan of taking pills or cutting but has not attempted, said she drank a pint of bouron and smoked some weed to try to get the voices to go away,she has HX of SI,)      HPI  (History provided by patient)  This is a 55 y.o. female who was brought in by EMS transportation for suicidal ideation. Patient states she is hearing voices telling her to harm herself. Specifically, she has a plan of cutting herself overdosing on pills or medication. She has not yet attempted. She does drink a moderate amount of bourbon and smoked marijuana in attempt to control the voices. However, she denies any other ingestions. She also denies having cut herself. She is not having any associated physical complaints, denying chest pain, shortness of breath, or other symptoms. She has not been ill recently. She has had similar symptoms before requiring psychiatric admission. [facility] ROS  10 systems reviewed, pertinent positives/negatives per HPI otherwise noted to be negative.     I have reviewed the following nursing documentation:  Allergies: Bupropion, Kiwi extract, Macrobid [nitrofurantoin macrocrystal], Morphine and related, and Varenicline    Past medical history:   Past Medical History:   Diagnosis Date    Alcohol abuse     Depression     Diverticulosis     DOMINICK (generalized anxiety disorder)     GERD (gastroesophageal reflux disease)     Kidney stone     Tobacco abuse      Past surgical history:   Past Surgical History:   Procedure Laterality Date    COLONOSCOPY  2006    HYSTERECTOMY  2008       Home medications:   Previous Medications    ACETAMINOPHEN (TYLENOL) 500 MG TABLET    Take 1 tablet by mouth 4 times daily as needed for Pain    ALBUTEROL SULFATE  (90 BASE) MCG/ACT INHALER    Use 2 puffs 4 times daily for 7 days then as needed for wheezing. Dispense with Spacer and instruct in use. At patient's preference may use 60 dose MDI. May Sub Pro-Air or Proventil as needed per insurance. AMLODIPINE (NORVASC) 2.5 MG TABLET    Take 1 tablet by mouth daily    CLONIDINE (CATAPRES) 0.1 MG TABLET    Take 1 tablet by mouth 2 times daily    DULOXETINE (CYMBALTA) 30 MG EXTENDED RELEASE CAPSULE    Take 3 capsules by mouth daily    IBUPROFEN (ADVIL;MOTRIN) 400 MG TABLET    Take 1 tablet by mouth every 8 hours as needed for Pain    LAMOTRIGINE (LAMICTAL) 25 MG TABLET    Take 2 tablets by mouth daily    QUETIAPINE (SEROQUEL) 50 MG TABLET    Take 1 tablet by mouth nightly    SENNOSIDES-DOCUSATE SODIUM (SENOKOT-S) 8.6-50 MG TABLET    Take 2 tablets by mouth daily       Social history:  reports that she has been smoking cigarettes. She started smoking about 40 years ago. She has a 40.00 pack-year smoking history. She has never used smokeless tobacco. She reports previous alcohol use of about 42.0 standard drinks of alcohol per week. She reports current drug use. Drug: Marijuana Westgladys Parisi). Family history:    Family History   Problem Relation Age of Onset    Diabetes Mother     High Cholesterol Mother     High Blood Pressure Mother     Diabetes Father     High Cholesterol Father     Cancer Father        Exam  ED Triage Vitals [03/20/22 1732]   BP Temp Temp Source Pulse Resp SpO2 Height Weight   (!) 124/91 98 °F (36.7 °C) Oral 79 19 98 % -- 174 lb (78.9 kg)     Nursing note and vitals reviewed. Constitutional: Subdued and quiet. Avoiding eye contact  HENT:      Head: Normocephalic and atraumatic. Ears: External ears normal.      Nose: Nose normal.     Mouth: Membrane mucosa moist and pink. Eyes: Anicteric sclera. No discharge. Neck: Supple. Trachea midline.    Cardiovascular: RRR; no murmurs, rubs, or gallops. Pulmonary/Chest: Effort normal. No respiratory distress. CTAB. No stridor. No wheezes. No rales. Abdominal: Soft. No distension. Musculoskeletal: Moves all extremities. No gross deformity. Neurological: Alert and oriented. Face symmetric. Speech is clear. Skin: Warm and dry. No rash. Psychiatric: Flat affect.   Avoiding eye contact    Labs  Results for orders placed or performed during the hospital encounter of 03/20/22   CBC with Auto Differential   Result Value Ref Range    WBC 5.7 4.0 - 11.0 K/uL    RBC 3.99 (L) 4.00 - 5.20 M/uL    Hemoglobin 13.8 12.0 - 16.0 g/dL    Hematocrit 40.1 36.0 - 48.0 %    .5 (H) 80.0 - 100.0 fL    MCH 34.5 (H) 26.0 - 34.0 pg    MCHC 34.3 31.0 - 36.0 g/dL    RDW 13.2 12.4 - 15.4 %    Platelets 083 916 - 871 K/uL    MPV 7.4 5.0 - 10.5 fL    Neutrophils % 48.0 %    Lymphocytes % 41.2 %    Monocytes % 5.4 %    Eosinophils % 4.3 %    Basophils % 1.1 %    Neutrophils Absolute 2.7 1.7 - 7.7 K/uL    Lymphocytes Absolute 2.3 1.0 - 5.1 K/uL    Monocytes Absolute 0.3 0.0 - 1.3 K/uL    Eosinophils Absolute 0.2 0.0 - 0.6 K/uL    Basophils Absolute 0.1 0.0 - 0.2 K/uL   Comprehensive Metabolic Panel w/ Reflex to MG   Result Value Ref Range    Sodium 141 136 - 145 mmol/L    Potassium reflex Magnesium 4.4 3.5 - 5.1 mmol/L    Chloride 104 99 - 110 mmol/L    CO2 23 21 - 32 mmol/L    Anion Gap 14 3 - 16    Glucose 105 (H) 70 - 99 mg/dL    BUN 11 7 - 20 mg/dL    CREATININE 0.7 0.6 - 1.1 mg/dL    GFR Non-African American >60 >60    GFR African American >60 >60    Calcium 10.1 8.3 - 10.6 mg/dL    Total Protein 7.0 6.4 - 8.2 g/dL    Albumin 4.4 3.4 - 5.0 g/dL    Albumin/Globulin Ratio 1.7 1.1 - 2.2    Total Bilirubin 0.3 0.0 - 1.0 mg/dL    Alkaline Phosphatase 113 40 - 129 U/L    ALT 25 10 - 40 U/L    AST 26 15 - 37 U/L   Urinalysis with Reflex to Culture    Specimen: Urine, clean catch   Result Value Ref Range    Color, UA Yellow Straw/Yellow    Clarity, UA Clear Clear    Glucose, Ur Negative Negative mg/dL    Bilirubin Urine Negative Negative    Ketones, Urine Negative Negative mg/dL    Specific Gravity, UA 1.010 1.005 - 1.030    Blood, Urine Negative Negative    pH, UA 6.0 5.0 - 8.0    Protein, UA Negative Negative mg/dL    Urobilinogen, Urine 0.2 <2.0 E.U./dL    Nitrite, Urine Negative Negative    Leukocyte Esterase, Urine Negative Negative    Microscopic Examination Not Indicated     Urine Type NotGiven     Urine Reflex to Culture Not Indicated    HCG Qualitative, Serum   Result Value Ref Range    hCG Qual Negative Detects HCG level >10 MIU/mL   Acetaminophen Level   Result Value Ref Range    Acetaminophen Level <5 (L) 10 - 30 ug/mL   Salicylate   Result Value Ref Range    Salicylate, Serum <2.5 (L) 15.0 - 30.0 mg/dL   Urine Drug Screen   Result Value Ref Range    Amphetamine Screen, Urine Neg Negative <1000ng/mL    Barbiturate Screen, Ur Neg Negative <200 ng/mL    Benzodiazepine Screen, Urine Neg Negative <200 ng/mL    Cannabinoid Scrn, Ur POSITIVE (A) Negative <50 ng/mL    Cocaine Metabolite Screen, Urine Neg Negative <300 ng/mL    Opiate Scrn, Ur Neg Negative <300 ng/mL    PCP Screen, Urine Neg Negative <25 ng/mL    Methadone Screen, Urine Neg Negative <300 ng/mL    Propoxyphene Scrn, Ur Neg Negative <300 ng/mL    Oxycodone Urine Neg Negative <100 ng/ml    pH, UA 6.0     Drug Screen Comment: see below    Ethanol   Result Value Ref Range    Ethanol Lvl 166 mg/dL   Ethanol   Result Value Ref Range    Ethanol Lvl 105 mg/dL       MDM and ED Course  Patient's presenting history is concerning, especially given previous psychiatric episodes. Work up was consistent with her reported marijuana and EtOH use. I monitored her for several hours. EtOH level steadily decreased over time. It is now less than 80. Otherwise, her work up is negative for an organic cause of her symptoms. She is medically cleared for psychiatric evaluation. Final Impression  1.  Suicidal ideation        Blood pressure (!) 124/91, pulse 79, temperature 98 °F (36.7 °C), temperature source Oral, resp. rate 19, weight 174 lb (78.9 kg), SpO2 98 %, not currently breastfeeding. Disposition:  Transfer to a psychiatric facility. This chart was generated using the [address] [place] [address]  dictation system. I created this record but it may contain dictation errors given the limitations of this technology.         Maddie Bay MD  03/20/22 1930

## 2022-03-21 NOTE — BH NOTE
Received Ativan 1 mg PO for CIWA of 8. Tearful. \"I'm just scared about getting evicted along with the rest in that building\". \"I'm glad I'm here.

## 2022-03-21 NOTE — ED NOTES
Discussed with Pt acute alcohol withdrawal and panic states, per Pt she stopped drinking a long time ago. RN discussed ways of trying to calm one self down during panic attacks and physiologic changes of panic attacks. Pt verbalized understanding of education and was educated on side effects of ativan.      Mohan Mckenna RN  03/21/22 8719

## 2022-03-21 NOTE — ED NOTES
Environment remains safe for pt. Pt resting comfortably in bed. Sitter at bedside. No distress or needs noted at this time.      Diaz Diallo RN  03/21/22 3994

## 2022-03-21 NOTE — ED NOTES
Pt found to be awake during rounding, appeared to be agitated, due to triage note of having drank alcohol this morning CIWA assessed and found to be 15. Pt actively dry heaving with sweats, tremors, and high anxiety. Discussed with Dr. Lee Ann Sierra and 1 mg of Ativan to be given to Pt.      Martha Newberry RN  03/21/22 2767

## 2022-03-21 NOTE — ED PROVIDER NOTES
Patient is medically cleared for transfer to mental health facility at 2:50 AM vitals are stable patient is alert     Teresa Neri MD  03/21/22 0012       Teresa Neri MD  03/26/22 8195
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
## 2022-03-21 NOTE — ED NOTES
Environment remains safe for pt. Pt resting comfortably in bed. Sitter at bedside. No distress or needs noted at this time.      Casie Snyder RN  03/21/22 1950

## 2022-03-21 NOTE — PROGRESS NOTES
CASE MANAGEMENT:     Follow up call to [facility] case management office from patients  Tegan Diamond at office number [phone]. Tegan sanchez patient was discharged (2) weeks ago from [facility] with a one month supply of medication. Patient cancelled counseling session last Friday and has not rescheduled. Tegan sanchez patient is in the [employer] [facility] program. When stable for DC her POC needs to get connected to [facility] for further support. Patient does have an eviction notice from current housing at end of April. [address]  requesting update if patient is transferred to [facility] or discharge home to maintain continuity in following this case.

## 2022-03-21 NOTE — ED NOTES
Environment remains safe for pt. Pt resting comfortably in bed. Sitter at bedside. No distress or needs noted at this time.      Uma Barbosa) Paige Salgado RN  03/21/22 [address] (Hugo Schmitt) Paige Salgado RN  03/21/22 9679 [facility]   12/30/2021 12:00 AM UC ICD REMOTE [facility]       Otilia Nguyen MD      Patient Instructions     [facility]  [facility])  [address] [place] [address], [address]  [place] [address]     phone:[phone]    

## 2022-03-22 ENCOUNTER — APPOINTMENT (OUTPATIENT)
Dept: ULTRASOUND IMAGING | Age: 57
DRG: 882 | End: 2022-03-22
Attending: PSYCHIATRY & NEUROLOGY

## 2022-03-22 PROBLEM — F60.89 CLUSTER B PERSONALITY DISORDER (HCC): Status: ACTIVE | Noted: 2022-03-22

## 2022-03-22 PROBLEM — F43.10 PTSD (POST-TRAUMATIC STRESS DISORDER): Status: ACTIVE | Noted: 2022-03-22

## 2022-03-22 PROBLEM — B18.2 CHRONIC HEPATITIS C VIRUS INFECTION (HCC): Status: ACTIVE | Noted: 2022-03-22

## 2022-03-22 PROBLEM — B00.1 HERPES LABIALIS WITHOUT COMPLICATION: Status: ACTIVE | Noted: 2022-03-22

## 2022-03-22 PROBLEM — F43.23 ADJUSTMENT DISORDER WITH MIXED ANXIETY AND DEPRESSED MOOD: Status: ACTIVE | Noted: 2022-03-22

## 2022-03-22 LAB
CHOLESTEROL, TOTAL: 279 MG/DL (ref 0–199)
FOLATE: >20 NG/ML (ref 4.78–24.2)
HDLC SERPL-MCNC: 102 MG/DL (ref 40–60)
HEPATITIS C ANTIBODY INTERPRETATION: NORMAL
LDL CHOLESTEROL CALCULATED: 150 MG/DL
MAGNESIUM: 2.2 MG/DL (ref 1.8–2.4)
TRIGL SERPL-MCNC: 137 MG/DL (ref 0–150)
VITAMIN B-12: 467 PG/ML (ref 211–911)
VLDLC SERPL CALC-MCNC: 27 MG/DL

## 2022-03-22 PROCEDURE — 6370000000 HC RX 637 (ALT 250 FOR IP): Performed by: PSYCHIATRY & NEUROLOGY

## 2022-03-22 PROCEDURE — 99222 1ST HOSP IP/OBS MODERATE 55: CPT

## 2022-03-22 PROCEDURE — 6370000000 HC RX 637 (ALT 250 FOR IP)

## 2022-03-22 PROCEDURE — 83036 HEMOGLOBIN GLYCOSYLATED A1C: CPT

## 2022-03-22 PROCEDURE — 80061 LIPID PANEL: CPT

## 2022-03-22 PROCEDURE — 1240000000 HC EMOTIONAL WELLNESS R&B

## 2022-03-22 PROCEDURE — 83735 ASSAY OF MAGNESIUM: CPT

## 2022-03-22 PROCEDURE — 99223 1ST HOSP IP/OBS HIGH 75: CPT | Performed by: PSYCHIATRY & NEUROLOGY

## 2022-03-22 PROCEDURE — 76856 US EXAM PELVIC COMPLETE: CPT

## 2022-03-22 PROCEDURE — 76830 TRANSVAGINAL US NON-OB: CPT

## 2022-03-22 PROCEDURE — 36415 COLL VENOUS BLD VENIPUNCTURE: CPT

## 2022-03-22 PROCEDURE — 87522 HEPATITIS C REVRS TRNSCRPJ: CPT

## 2022-03-22 RX ORDER — AMLODIPINE BESYLATE 2.5 MG/1
2.5 TABLET ORAL DAILY
Status: DISCONTINUED | OUTPATIENT
Start: 2022-03-22 | End: 2022-03-24 | Stop reason: HOSPADM

## 2022-03-22 RX ORDER — LAMOTRIGINE 25 MG/1
50 TABLET ORAL DAILY
Status: DISCONTINUED | OUTPATIENT
Start: 2022-03-22 | End: 2022-03-24 | Stop reason: HOSPADM

## 2022-03-22 RX ORDER — QUETIAPINE FUMARATE 25 MG/1
50 TABLET, FILM COATED ORAL NIGHTLY
Status: DISCONTINUED | OUTPATIENT
Start: 2022-03-22 | End: 2022-03-23

## 2022-03-22 RX ORDER — QUETIAPINE FUMARATE 25 MG/1
50 TABLET, FILM COATED ORAL NIGHTLY
Status: DISCONTINUED | OUTPATIENT
Start: 2022-03-22 | End: 2022-03-22

## 2022-03-22 RX ORDER — QUETIAPINE FUMARATE 25 MG/1
25 TABLET, FILM COATED ORAL
Status: DISCONTINUED | OUTPATIENT
Start: 2022-03-23 | End: 2022-03-23

## 2022-03-22 RX ORDER — CLONIDINE HYDROCHLORIDE 0.1 MG/1
0.1 TABLET ORAL 2 TIMES DAILY
Status: DISCONTINUED | OUTPATIENT
Start: 2022-03-22 | End: 2022-03-24 | Stop reason: HOSPADM

## 2022-03-22 RX ORDER — ACYCLOVIR 200 MG/1
200 CAPSULE ORAL
Status: DISCONTINUED | OUTPATIENT
Start: 2022-03-22 | End: 2022-03-24 | Stop reason: HOSPADM

## 2022-03-22 RX ORDER — ALBUTEROL SULFATE 90 UG/1
2 AEROSOL, METERED RESPIRATORY (INHALATION) EVERY 6 HOURS PRN
Status: DISCONTINUED | OUTPATIENT
Start: 2022-03-22 | End: 2022-03-24 | Stop reason: HOSPADM

## 2022-03-22 RX ORDER — DIMETHICONE, OXYBENZONE, AND PADIMATE O 2; 2.5; 6.6 G/100G; G/100G; G/100G
STICK TOPICAL PRN
Status: DISCONTINUED | OUTPATIENT
Start: 2022-03-22 | End: 2022-03-23

## 2022-03-22 RX ADMIN — Medication 1 TABLET: at 08:49

## 2022-03-22 RX ADMIN — Medication: at 14:37

## 2022-03-22 RX ADMIN — ACYCLOVIR 200 MG: 200 CAPSULE ORAL at 18:44

## 2022-03-22 RX ADMIN — ACYCLOVIR 200 MG: 200 CAPSULE ORAL at 14:08

## 2022-03-22 RX ADMIN — ACETAMINOPHEN 650 MG: 325 TABLET ORAL at 18:44

## 2022-03-22 RX ADMIN — ACYCLOVIR 200 MG: 200 CAPSULE ORAL at 22:26

## 2022-03-22 RX ADMIN — LORAZEPAM 1 MG: 1 TABLET ORAL at 14:08

## 2022-03-22 RX ADMIN — QUETIAPINE FUMARATE 50 MG: 25 TABLET ORAL at 20:59

## 2022-03-22 RX ADMIN — CLONIDINE HYDROCHLORIDE 0.1 MG: 0.1 TABLET ORAL at 13:54

## 2022-03-22 RX ADMIN — LAMOTRIGINE 50 MG: 25 TABLET ORAL at 13:54

## 2022-03-22 RX ADMIN — DULOXETINE 90 MG: 30 CAPSULE, DELAYED RELEASE ORAL at 13:54

## 2022-03-22 RX ADMIN — Medication 100 MG: at 08:49

## 2022-03-22 RX ADMIN — CLONIDINE HYDROCHLORIDE 0.1 MG: 0.1 TABLET ORAL at 20:59

## 2022-03-22 RX ADMIN — AMLODIPINE BESYLATE 2.5 MG: 2.5 TABLET ORAL at 13:54

## 2022-03-22 RX ADMIN — LORAZEPAM 1 MG: 1 TABLET ORAL at 08:49

## 2022-03-22 ASSESSMENT — ENCOUNTER SYMPTOMS
NAUSEA: 1
VOMITING: 0
EYES NEGATIVE: 1
RESPIRATORY NEGATIVE: 1
ABDOMINAL DISTENTION: 1

## 2022-03-22 ASSESSMENT — PAIN SCALES - GENERAL: PAINLEVEL_OUTOF10: 6

## 2022-03-22 NOTE — FLOWSHEET NOTE
Purposeful Rounding    Patient Location: Patient room    Patient willing to engage in conversation: No    Presentation/behavior: Other resting*    Affect: Neutral/Euthymic(normal)    Concerns reported: pt noted to be resting, eyes closed, respirations even and easy    PRN medications given: n/a    Environmental assessment: Room free from clutter, Clear path to bathroom  and No safety hazards noted    Fall prevention interventions in place: Yellow non-skid socks on    Daily Jaiden Fall Risk Score: 49    Daily Blevins Fall Risk Score: 0-low      Electronically signed by Guerita Ashford RN on 3/22/22 at 1:05 PM EDT

## 2022-03-22 NOTE — BH NOTE
[facility]  Admission Note     Admission Type:   Admission Type: Voluntary    Reason for admission:  Reason for Admission: Suicidal Ideation    PATIENT STRENGTHS:  Strengths: Connection to output provider,No significant Physical Illness,Medication Compliance    Patient Strengths and Limitations:  Limitations: Hopeless about future,Difficulty problem solving/relies on others to help solve problems,Inappropriate/potentially harmful leisure interests    Addictive Behavior:   Addictive Behavior  In the past 3 months, have you felt or has someone told you that you have a problem with:  : None  Do you have a history of Chemical Use?: No  Do you have a history of Alcohol Use?: Yes  Do you have a history of Street Drug Abuse?: No  Histroy of Prescripton Drug Abuse?: No    Medical Problems:   Past Medical History:   Diagnosis Date    Alcohol abuse     Depression     Diverticulosis     DOMINICK (generalized anxiety disorder)     GERD (gastroesophageal reflux disease)     Kidney stone     Tobacco abuse        Status EXAM:  Status and Exam  Normal: No  Facial Expression: Sad,Worried  Affect: Congruent  Level of Consciousness: Alert  Mood:Normal: No  Mood: Sad,Anxious  Motor Activity:Normal: Yes  Interview Behavior: Cooperative  Preception: [place] to Person,[place] to Time,[place] to Place,[place] to Situation  Attention:Normal: Yes  Thought Content:Normal: No  Hallucinations: None  Delusions: No  Memory:Normal: Yes  Insight and Judgment: No  Insight and Judgment: Poor Judgment,Poor Insight,Unmotivated,Unrealistic  Present Suicidal Ideation: No  Present Homicidal Ideation: No    Tobacco Screening:  Practical Counseling, on admission, jacobo X, if applicable and completed (first 3 are required if patient doesn't refuse):            (X)  Recognizing danger situations (included triggers and roadblocks)                    (X)  Coping skills (new ways to manage stress, exercise, relaxation techniques, changing routine, distraction)                                                           (X)  Basic information about quitting (benefits of quitting, techniques in how to quit, available resources  ( ) Referral for counseling faxed to Denise                                           ( ) Patient refused counseling  ( ) Patient has not smoked in the last 30 days    Metabolic Screening:    Lab Results   Component Value Date    LABA1C 5.5 07/18/2021       Lab Results   Component Value Date    CHOL 227 (H) 07/18/2021    CHOL 199 11/28/2020    CHOL 203 (H) 02/02/2010     Lab Results   Component Value Date    TRIG 132 07/18/2021    TRIG 95 11/28/2020    TRIG 94 02/02/2010     Lab Results   Component Value Date    HDL 78 (H) 07/18/2021    HDL 73 (H) 11/28/2020    HDL 57 02/02/2010     No components found for: LDLCAL  Lab Results   Component Value Date    LABVLDL 26 07/18/2021    LABVLDL 19 11/28/2020    LABVLDL 19 02/02/2010         Body mass index is 26.63 kg/m². BP Readings from Last 2 Encounters:   03/21/22 (!) 158/106   03/21/22 (!) 165/101           Pt admitted with followings belongings:  Dental Appliances: None  Vision - Corrective Lenses: Glasses  Hearing Aid: None  Jewelry: None  Body Piercings Removed: N/A  Clothing: Pants,Shirt,Footwear,Undergarments (Comment)  Were All Patient Medications Collected?: Not Applicable  Other Valuables: Cell phone,Wallet,Keys (4 CC)     Patient's home medications No, per patient. Patient oriented to surroundings and program expectations and copy of patient rights given Yes. Received admission packet: Yes. Consents reviewed, and signed  Yes. Patient verbalize understanding: Yes. Patient education on precautions: Yes.                   Mao Davidson RN

## 2022-03-22 NOTE — H&P
Hospital Medicine History & Physical      PCP: MITCHELL Mondragon NP    Date of Admission: 3/21/2022    Date of Service: Pt seen/examined on 3/22/22    Chief Complaint:  No chief complaint on file. History Of Present Illness: The patient is a 55 y.o. female with pmhx of diverticulosis, GERD, alcohol and tobacco abuse, and depression who presented to [facility] for suicidal ideation. Patient was seen and evaluated in the ED by the ED medical provider, patient was medically cleared for admission to [facility]. This note serves as an admission medical H&P. Tobacco use: Current, everyday 1 ppday   ETOH use: None  Illicit drug use: Cannabis     Patient complaining of cold sores on her lips and dry cracked lips. Patient states she has hx of cold sores but has never been on medication for them. She has also been picking at the sores because she is so anxious. Past Medical History:        Diagnosis Date    Alcohol abuse     Depression     Diverticulosis     DOMINICK (generalized anxiety disorder)     GERD (gastroesophageal reflux disease)     Kidney stone     Tobacco abuse        Past Surgical History:        Procedure Laterality Date    COLONOSCOPY  2006    HYSTERECTOMY  2008       Medications Prior to Admission:    Prior to Admission medications    Medication Sig Start Date End Date Taking? Authorizing Provider   DULoxetine (CYMBALTA) 30 MG extended release capsule Take 3 capsules by mouth daily 2/18/22   Lauren Interiano MD   acetaminophen (TYLENOL) 500 MG tablet Take 1 tablet by mouth 4 times daily as needed for Pain 1/25/22   MITCHELL Burkett CNP   ibuprofen (ADVIL;MOTRIN) 400 MG tablet Take 1 tablet by mouth every 8 hours as needed for Pain 1/25/22   MITCHELL Burkett CNP   albuterol sulfate  (90 Base) MCG/ACT inhaler Use 2 puffs 4 times daily for 7 days then as needed for wheezing. Dispense with Spacer and instruct in use.   At patient's preference may use 60 dose MDI. May Sub Pro-Air or Proventil as needed per insurance. 1/25/22   Xiomara Kang, APRN - CNP   lamoTRIgine (LAMICTAL) 25 MG tablet Take 2 tablets by mouth daily 12/21/21   Gerard Stephenson MD   QUEtiapine (SEROQUEL) 50 MG tablet Take 1 tablet by mouth nightly 12/20/21   Gerard Stephenson MD   sennosides-docusate sodium (SENOKOT-S) 8.6-50 MG tablet Take 2 tablets by mouth daily 12/21/21   Gerard Stephenson MD   amLODIPine (NORVASC) 2.5 MG tablet Take 1 tablet by mouth daily 12/20/21   Gerard Stephenson MD   cloNIDine (CATAPRES) 0.1 MG tablet Take 1 tablet by mouth 2 times daily 12/20/21   Gerard Stephenson MD       Allergies:  Bupropion, Kiwi extract, Macrobid [nitrofurantoin macrocrystal], Morphine and related, and Varenicline    Social History:    TOBACCO:   reports that she has been smoking cigarettes. She started smoking about 40 years ago. She has a 40.00 pack-year smoking history. She has never used smokeless tobacco.  ETOH:   reports previous alcohol use of about 42.0 standard drinks of alcohol per week.       Family History:   Positive as follows:        Problem Relation Age of Onset    Diabetes Mother     High Cholesterol Mother     High Blood Pressure Mother     Diabetes Father     High Cholesterol Father     Cancer Father        REVIEW OF SYSTEMS:       Constitutional: Negative for fever   HENT: Negative for sore throat   Eyes: Negative for redness   Respiratory: Negative  for dyspnea, cough   Cardiovascular: Negative for chest pain   Gastrointestinal: Negative for vomiting, diarrhea   Genitourinary: Negative for hematuria   Musculoskeletal: Negative for arthralgias   Skin: Negative for rash, + orolabial sores   Neurological: Negative for syncope    Hematological: Negative for easy bruising/bleeding   Psychiatric/Behavorial: Per psychiatry team evaluation     PHYSICAL EXAM:    BP (!) 154/99   Pulse 66   Temp 97.1 °F (36.2 °C) (Temporal)   Resp 16   Ht 5' 5\" (1.651 m)   Wt 160 lb (72.6 kg)   LMP  (LMP Unknown)   SpO2 98%   Breastfeeding No   BMI 26.63 kg/m²     Gen: No distress. Alert. Eyes: PERRL. No sclera icterus. No conjunctival injection. .   Neck: No JVD. Trachea midline. Resp: No accessory muscle use. No crackles. No wheezes. No rhonchi. CV: Regular rate. Regular rhythm. No murmur. No rub. No edema. GI: Non-tender. Non-distended. Normal bowel sounds. Skin: Warm and dry. No nodule on exposed extremities. No rash on exposed extremities. + scattered open and healing orolabial sores   M/S: No cyanosis. No joint deformity. No clubbing. Neuro: Awake. No focal neurologic deficit on exam.  Cranial nerves II through XII intact. Patient is able to ambulate without difficulty.   Psych: Per psychiatry team evaluation     Lab Results   Component Value Date    WBC 5.7 03/20/2022    HGB 13.8 03/20/2022    HCT 40.1 03/20/2022    .5 (H) 03/20/2022     03/20/2022     Lab Results   Component Value Date     03/20/2022    K 4.4 03/20/2022     03/20/2022    CO2 23 03/20/2022    BUN 11 03/20/2022    CREATININE 0.7 03/20/2022    GLUCOSE 105 (H) 03/20/2022    CALCIUM 10.1 03/20/2022    PROT 7.0 03/20/2022    LABALBU 4.4 03/20/2022    BILITOT 0.3 03/20/2022    ALKPHOS 113 03/20/2022    AST 26 03/20/2022    ALT 25 03/20/2022    LABGLOM >60 03/20/2022    GFRAA >60 03/20/2022    AGRATIO 1.7 03/20/2022    GLOB 3.2 10/14/2021       Lab Results   Component Value Date    TSH 2.68 03/21/2022     Lab Results   Component Value Date    ALT 25 03/20/2022    AST 26 03/20/2022    GGT 26 (H) 02/02/2010    ALKPHOS 113 03/20/2022    BILITOT 0.3 03/20/2022         U/A:    Lab Results   Component Value Date    COLORU Yellow 03/20/2022    WBCUA 6 02/18/2022    RBCUA 1 02/18/2022    BACTERIA 1+ 02/18/2022    CLARITYU Clear 03/20/2022    SPECGRAV 1.010 03/20/2022    LEUKOCYTESUR Negative 03/20/2022    BLOODU Negative 03/20/2022    GLUCOSEU Negative 03/20/2022    GLUCOSEU NEGATIVE 02/05/2010    AMORPHOUS 1+ 11/29/2020       CULTURES  COVID 19 Not detected     RADIOLOGY    No orders to display         Pertinent previous results reviewed   Pelvic U/S on 7/19/21   Septated nodule left ovary.  Recommend follow-up pelvic ultrasound in 2-3   months time for further characterization       ASSESSMENT/PLAN:    #suicidal ideation   #borderline personality disorder   #depression   #PTSD   -atarax, zyprexa, seroquel, lamictal   -per pyschiatry team     #Asthma   -pt denies hx of asthma or COPD but has albuterol inhaler prn    #Hypertension   -continue norvasc and catapres     #Alcohol abuse hx  -thiamine supplement  -ativan prn for withdrawal     #Cannabis Use   -counseled on cessation     #Tobacco use   -counseled on cessation   -nicotine lozeng    #Ovarian mass   -last seen on pelvic U/S 6 months ago   -patient unable to follow up   -pelvic U/S ordered    #Chronic Hepatitis C  -per chart review, patient denies this diagnosis   - last hep panel in 2018 was negative   -patient asymptomatic, no jaundice  -LFTs WNL, GGT elevated   -Hep C PCR and antibody pending      #Cold Sores   -start acyclovir   -medicated lip delmi Bishop PA-C

## 2022-03-22 NOTE — FLOWSHEET NOTE
Met with Pt per her request. Listened and offered emotional support. Pt shared about financial struggles, very tearful.  acknowledged and affirmed Pt's emotions. Prayed with Pt for her finances, per her request. Also provided list of resources from [place] website. Will continue to follow as needed. [address]       03/22/22 1299   Encounter Summary   Services provided to: Patient   Referral/Consult From: Patient   Continue Visiting   (3/22 initial, sppt and prayer, provided [facility] resources)   Complexity of Encounter Moderate   Length of Encounter 15 minutes   Spiritual Assessment Completed Yes   Spiritual/Baptism   Type Spiritual struggle   Assessment Approachable;Tearful; Anxious; Fearful; Hopeless; Helplessness   Intervention Active listening;Explored feelings, thoughts, concerns;Prayer;Discussed relationship with God;Assisted with financial resources   Outcome Comfort;Expressed gratitude;Engaged in conversation;Expressed feelings/needs/concerns;Receptive;Venting emotion

## 2022-03-22 NOTE — H&P
HISTORY AND PHYSICAL             Date: 3/22/2022        Patient Name: Tiburcio Sears     YOB: 1965      Age:  55 y.o. Chief Complaint   No chief complaint on file. Depression and anxiety      History Obtained From   patient    History of Present Illness   Ms. Casanova is a 55year old female with a history of major depressive disorder, PTSD, Borderline personality disorder and alcoholism who reports that for one week she has felt severely depressed and anxious, not sleeping but 3 hours per night but fatigued in the AM, anhedonia, decreased concentration, decreased appetite, suicidal thoughts. This is directly due to having been evicted. In February she got COVID and got behind in her rent. She is terrified of being homeless and is worried that she will have to live on the street. She requests benzodiazepines for anxiety. She became so stressed that 4 days ago she started drinking again. She had been sober for three months prior to that, and she is feeling very embarrassed about having relapsed. She denies symptoms of tigre or psychosis. Past Medical History     Past Medical History:   Diagnosis Date    Alcohol abuse     Alcohol use disorder, moderate, dependence (HCC)     Cannabis use disorder, mild, abuse     [facility]     Cluster B personality disorder ([facility]     Depression     Diverticulosis     DOMINICK (generalized anxiety disorder)     GERD (gastroesophageal reflux disease)     Kidney stone     PTSD (post-traumatic stress disorder)     Tobacco abuse         Past Surgical History     Past Surgical History:   Procedure Laterality Date    COLONOSCOPY  2006    HYSTERECTOMY  2008      Past psych history:    Multiple psychiatric admissions per year, including at the [address] [place] [address] Court for depression and reported Bipolar Disorder. She also has a history of PTSD and Borderline Personality Disorder.   She has a history of cutting self when under stress. She receives psychiatric care at [facility]. She says she has a . Medications Prior to Admission     Prior to Admission medications    Medication Sig Start Date End Date Taking? Authorizing Provider   DULoxetine (CYMBALTA) 30 MG extended release capsule Take 3 capsules by mouth daily 2/18/22   Óscar Quintana MD   acetaminophen (TYLENOL) 500 MG tablet Take 1 tablet by mouth 4 times daily as needed for Pain 1/25/22   MITCHELL Figueredo CNP   ibuprofen (ADVIL;MOTRIN) 400 MG tablet Take 1 tablet by mouth every 8 hours as needed for Pain 1/25/22   [place] Nguyễn APRN - CNP   albuterol sulfate  (90 Base) MCG/ACT inhaler Use 2 puffs 4 times daily for 7 days then as needed for wheezing. Dispense with Spacer and instruct in use. At patient's preference may use 60 dose MDI. May Sub Pro-Air or Proventil as needed per insurance.  1/25/22   MITCHELL Figueredo CNP   lamoTRIgine (LAMICTAL) 25 MG tablet Take 2 tablets by mouth daily 12/21/21   Marianne Fernandez MD   QUEtiapine (SEROQUEL) 50 MG tablet Take 1 tablet by mouth nightly 12/20/21   Marianne Fernandez MD   sennosides-docusate sodium (SENOKOT-S) 8.6-50 MG tablet Take 2 tablets by mouth daily 12/21/21   Marianne Fernandez MD   amLODIPine (NORVASC) 2.5 MG tablet Take 1 tablet by mouth daily 12/20/21   Marianne Fernandez MD   cloNIDine (CATAPRES) 0.1 MG tablet Take 1 tablet by mouth 2 times daily 12/20/21   Marianne Fernandez MD        Allergies   Bupropion, Kiwi extract, Macrobid [nitrofurantoin macrocrystal], Morphine and related, and Varenicline    Social History     Social History     Tobacco History     Smoking Status  Current Every Day Smoker Smoking Start Date  10/15/1981 Smoking Frequency  1 pack/day for 40 years (40 pk yrs) Smoking Tobacco Type  Cigarettes    Smokeless Tobacco Use  Never Used          Alcohol History     Alcohol Use Status  Not Currently Drinks/Week  42 Cans of beer per week Amount  42.0 standard drinks of Unknown)   SpO2 98%   Breastfeeding No   BMI 26.63 kg/m²     MENTAL STATUS EXAM      General appearance:  [x]  appears age, []  appears older than stated age,     [x]  adequately dressed and groomed, [] disheveled,                []  in no acute distress, [x] appears mildly distressed,      []  Other:      MUSCULOSKELETAL:   Gait:   [x] normal, [] antalgic, [] unsteady, [] in bed, gait not evaluated   Station:  [x] erect, [] sitting, [] recumbent, [] other        Strength/tone:  [x] muscle strength and tone appear consistent with age and condition     [] atrophy      [] abnormal movements  PSYCHIATRIC:    Relatedness:  [x] cooperative, [] guarded, [] indifferent, [] hostile,      [] sedated  Speech:  [x] normal prosody, [] pressured, [] decreased volume,    [] slurred, [] halting, [] slowed, [] Loud     [] echolalia, [] incoherent, [] stuttering   Eye contact:  [x] direct, [] avoidant, [] intense  Kinetics:  [x] normal, [] increased, [] decreased  Mood:   [] euthymic, [] depressed, [x] anxious, [] irritable,     [] labile  Affect:   [] normal range, [] constricted, [] depressed, [x] anxious,     [] angry, [] blunted, [] flat  Hallucinations  [x] denies, [] auditory,  [] visual,  [] olfactory, [] tactile  Delusions  [x] none, [] grandiose,  [] jealous,  [] persecutory,  [] somatic,     [] bizarre  Preoccupations  [x] upset about eviction, [] violence, [] obsessions, [] other     Suicidal ideation  [x] denies, [] endorses  Homicidal ideation [x] denies, [] endorses  Thought process: [x] logical, [] circumstantial, [] tangential,      [] concrete, [] disorganized  Associations:  [x] logical and coherent, [] loosening  Insight:   [] good, [x] fair, [] poor  Judgment:  [] good, [x] fair, [] poor  Attention and concentration:     [x] intact, [] limited, [] able to focus on interview,     [] grossly impaired  Orientation:  [x] person, place, time, situation     [] disoriented to:     Memory:  Remote memory [x] intact, [] impaired     Recent memory  [x] intact, [] impaired            Labs      Recent Results (from the past 24 hour(s))   TSH without Reflex    Collection Time: 03/21/22  4:34 PM   Result Value Ref Range    TSH 2.68 0.27 - 4.20 uIU/mL   Vitamin B12 & Folate    Collection Time: 03/21/22  4:34 PM   Result Value Ref Range    Vitamin B-12 467 211 - 911 pg/mL    Folate >20.00 4.78 - 24.20 ng/mL   Magnesium    Collection Time: 03/22/22  7:23 AM   Result Value Ref Range    Magnesium 2.20 1.80 - 2.40 mg/dL        Imaging/Diagnostics Last 24 Hours   No results found. Assessment      Hospital Problems           Last Modified POA    * (Principal) Adjustment disorder with mixed anxiety and depressed mood 3/22/2022 Yes    Suicidal ideation 3/22/2022 Yes    Nausea 3/22/2022 Yes    Alcohol use disorder, moderate, in controlled environment ([facility] 3/22/2022 Yes    Cannabis use disorder, mild, abuse 3/22/2022 Yes    Borderline personality disorder ([facility] 3/22/2022 Yes    Depressive disorder 3/22/2022 Yes    Chronic hepatitis C virus infection ([facility] 3/22/2022 Yes    PTSD (post-traumatic stress disorder) 3/22/2022 Yes    Cluster B personality disorder ([facility] 3/22/2022 Yes        Omer Ramsey has a history of PTSD, depression and alcoholism. She got behind in her rent and is going to be evicted. Being upset about this caused her to relapse on alcohol. When she came to the hospital she was suicidal and depressed. At this time, she is anxious and depressed about the specific topics of having relapsed and fear of homelessness. Her Borderline Personality Disorder explains her emotional lability and difficulty handling stress. She has been admitted and is not in alcohol withdrawal.  Medications will be adjusted and she will be invited to groups. She will be invited to speak to our chemical dependency worker. Plan   1. Will start an AM dose of Seroquel 25 mg and continue the HS dose of Seroquel 50 mg  2.  Continue other meds as previously ordered  3. Admitted to [facility]  4. SW consult  5.  Chemical dependency consult    Consultations Ordered:  IP CONSULT TO HOSPITALIST    Electronically signed by Henry Mauro MD on 3/22/22 at 12:30 PM EDT

## 2022-03-22 NOTE — PLAN OF CARE
Patient alert and oriented x 3. Patient rates Depression 7/10 and Anxiety 10/10. Patient denies SI/HI/A/V/H. Patient's admission is completed home medications need verified with patient's pharmacy in the morning. Patient doesn't have HS medications scheduled. 21:30 CIWA score was 3. Patient doesn't want to be woke up during the night for her CIWA assessment. Patient states, \"I am tired and I want to get good night sleep. \" Patient able to contract for safety with this writer. Patient denies self harm. No c/o's voiced at present.

## 2022-03-22 NOTE — PROGRESS NOTES
Behavioral Services  Medicare Certification Upon Admission    I certify that this patient's inpatient psychiatric hospital admission is medically necessary for:    [x] (1) Treatment which could reasonably be expected to improve this patient's condition,       [x] (2) Or for diagnostic study;     AND     [x](2) The inpatient psychiatric services are provided while the individual is under the care of a physician and are included in the individualized plan of care.     Estimated length of stay/service 3-4 days    Plan for post-hospital care [facility]    Electronically signed by Maura Slaughter MD on 3/22/2022 at 12:19 PM

## 2022-03-22 NOTE — FLOWSHEET NOTE
Purposeful Rounding    Patient Location: Nurses station    Patient willing to engage in conversation: Yes    Presentation/behavior: Anxious    Affect: Anxious    Concerns reported: pt tearful, states she is having high anxiety. CIWA 11    PRN medications given: Ativan 1 mg given per CIWA order for withdraw.     Environmental assessment: Room free from clutter, Clear path to bathroom  and No safety hazards noted    Fall prevention interventions in place: Yellow non-skid socks on    Daily Jaiden Fall Risk Score: 49    Daily Blevins Fall Risk Score: 0 -low      Electronically signed by Jose aLntigua RN on 3/22/22 at 8:59 AM EDT

## 2022-03-22 NOTE — FLOWSHEET NOTE
Purposeful Rounding    Patient Location: Day room    Patient willing to engage in conversation: Yes    Presentation/behavior: Anxious    Affect: Flat/blunted    Concerns reported: pt returned from Ultrasound. Patient stating she was have increased pain d/t procedure in Ultrasound. Rates her pain a 6 on a 1-10 scale.     PRN medications given: Tylenol 650 mg per order    Environmental assessment: Room free from clutter, Clear path to bathroom  and No safety hazards noted    Fall prevention interventions in place: Yellow non-skid socks on    Daily Jaiden Fall Risk Score: 49  Daily Blevins Fall Risk Score: 0-low      Electronically signed by Isaiah Norwood RN on 3/22/22 at 6:42 PM EDT

## 2022-03-22 NOTE — PLAN OF CARE
Problem: Suicide risk  Goal: Provide patient with safe environment  Description: Provide patient with safe environment  Outcome: Ongoing     Problem: Depressive Behavior With or Without Suicide Precautions:  Goal: Able to verbalize acceptance of life and situations over which he or she has no control  Description: Able to verbalize acceptance of life and situations over which he or she has no control  Outcome: Ongoing     Problem: Depressive Behavior With or Without Suicide Precautions:  Goal: Able to verbalize and/or display a decrease in depressive symptoms  Description: Able to verbalize and/or display a decrease in depressive symptoms  Outcome: Ongoing     Problem: Depressive Behavior With or Without Suicide Precautions:  Goal: Ability to disclose and discuss suicidal ideas will improve  Description: Ability to disclose and discuss suicidal ideas will improve  Outcome: Ongoing     Problem: Depressive Behavior With or Without Suicide Precautions:  Goal: Able to verbalize support systems  Description: Able to verbalize support systems  Outcome: Ongoing       Patient visible on unit. Social with peers. Attended and participated in  groups. She was medication compliant. She was tearful during interview. Continues to have increased anxiety and symptoms of withdraw requiring PRN medications. She denies SI/HI/AVH. She states she is feeling overwhelmed due to recent eviction notice she received. She stated that she feels as soon as things start going well, something happens and sets her back. She was able to meet with her , Jaiden Alexander today. Patient states she will be able to help her navigate moving. Patient mood improved after speaking with .

## 2022-03-23 LAB
ESTIMATED AVERAGE GLUCOSE: 116.9 MG/DL
HAV IGM SER IA-ACNC: NORMAL
HBA1C MFR BLD: 5.7 %
HEPATITIS B CORE IGM ANTIBODY: NORMAL
HEPATITIS B SURFACE ANTIGEN INTERPRETATION: NORMAL
HEPATITIS C ANTIBODY INTERPRETATION: NORMAL

## 2022-03-23 PROCEDURE — 99232 SBSQ HOSP IP/OBS MODERATE 35: CPT | Performed by: PSYCHIATRY & NEUROLOGY

## 2022-03-23 PROCEDURE — 80074 ACUTE HEPATITIS PANEL: CPT

## 2022-03-23 PROCEDURE — 36415 COLL VENOUS BLD VENIPUNCTURE: CPT

## 2022-03-23 PROCEDURE — 6370000000 HC RX 637 (ALT 250 FOR IP): Performed by: PSYCHIATRY & NEUROLOGY

## 2022-03-23 PROCEDURE — 1240000000 HC EMOTIONAL WELLNESS R&B

## 2022-03-23 PROCEDURE — 6370000000 HC RX 637 (ALT 250 FOR IP)

## 2022-03-23 RX ORDER — PRAZOSIN HYDROCHLORIDE 1 MG/1
1 CAPSULE ORAL NIGHTLY
Status: DISCONTINUED | OUTPATIENT
Start: 2022-03-23 | End: 2022-03-24 | Stop reason: HOSPADM

## 2022-03-23 RX ORDER — BACITRACIN ZINC AND POLYMYXIN B SULFATE 500; 1000 [USP'U]/G; [USP'U]/G
OINTMENT TOPICAL 4 TIMES DAILY
Status: DISCONTINUED | OUTPATIENT
Start: 2022-03-23 | End: 2022-03-24 | Stop reason: HOSPADM

## 2022-03-23 RX ORDER — PROPRANOLOL HYDROCHLORIDE 10 MG/1
10 TABLET ORAL 3 TIMES DAILY
Status: DISCONTINUED | OUTPATIENT
Start: 2022-03-23 | End: 2022-03-24 | Stop reason: HOSPADM

## 2022-03-23 RX ORDER — QUETIAPINE FUMARATE 25 MG/1
25 TABLET, FILM COATED ORAL NIGHTLY
Status: DISCONTINUED | OUTPATIENT
Start: 2022-03-23 | End: 2022-03-24 | Stop reason: HOSPADM

## 2022-03-23 RX ADMIN — Medication 100 MG: at 08:39

## 2022-03-23 RX ADMIN — ACYCLOVIR 200 MG: 200 CAPSULE ORAL at 12:33

## 2022-03-23 RX ADMIN — BACITRACIN ZINC AND POLYMYXIN B SULFATE: at 14:34

## 2022-03-23 RX ADMIN — CLONIDINE HYDROCHLORIDE 0.1 MG: 0.1 TABLET ORAL at 08:39

## 2022-03-23 RX ADMIN — CLONIDINE HYDROCHLORIDE 0.1 MG: 0.1 TABLET ORAL at 21:21

## 2022-03-23 RX ADMIN — AMLODIPINE BESYLATE 2.5 MG: 2.5 TABLET ORAL at 08:39

## 2022-03-23 RX ADMIN — QUETIAPINE FUMARATE 25 MG: 25 TABLET ORAL at 21:20

## 2022-03-23 RX ADMIN — QUETIAPINE FUMARATE 25 MG: 25 TABLET ORAL at 08:39

## 2022-03-23 RX ADMIN — DULOXETINE 90 MG: 30 CAPSULE, DELAYED RELEASE ORAL at 08:40

## 2022-03-23 RX ADMIN — Medication 1 TABLET: at 08:39

## 2022-03-23 RX ADMIN — HYDROXYZINE HYDROCHLORIDE 50 MG: 50 TABLET, FILM COATED ORAL at 08:42

## 2022-03-23 RX ADMIN — ACYCLOVIR 200 MG: 200 CAPSULE ORAL at 21:22

## 2022-03-23 RX ADMIN — PRAZOSIN HYDROCHLORIDE 1 MG: 1 CAPSULE ORAL at 21:20

## 2022-03-23 RX ADMIN — ACYCLOVIR 200 MG: 200 CAPSULE ORAL at 15:45

## 2022-03-23 RX ADMIN — PROPRANOLOL HYDROCHLORIDE 10 MG: 10 TABLET ORAL at 12:33

## 2022-03-23 RX ADMIN — PROPRANOLOL HYDROCHLORIDE 10 MG: 10 TABLET ORAL at 21:20

## 2022-03-23 RX ADMIN — LAMOTRIGINE 50 MG: 25 TABLET ORAL at 08:39

## 2022-03-23 RX ADMIN — ACYCLOVIR 200 MG: 200 CAPSULE ORAL at 05:48

## 2022-03-23 RX ADMIN — BACITRACIN ZINC AND POLYMYXIN B SULFATE: at 18:06

## 2022-03-23 NOTE — PROGRESS NOTES
Department of Psychiatry  AttendingProgress Note    Chief Complaint: anxiety    Feeling more anxious on the AM dose of seroquel. Also complains of nightmares. Anxiety is in the form of panic attacks and free-floating anxiety with physical symptoms such as fluttering in abdomen and nausea. She requests to stop the AM seroquel and add anxiety medication    The treatment team met and discussed the treatment plan. SUBJECTIVE:        Suicidal ideation:  passive. Patient has medication side effects. See above  ROS: Patient has new complaints: yes - See above  Sleeping adequately:  No:    Appetite adequate: Yes  Attending groups: Yes      OBJECTIVE    Physical  Vitals:    Blood pressure 129/87, pulse 80, temperature 97.6 °F (36.4 °C), temperature source Temporal, resp. rate 16, height 5' 5\" (1.651 m), weight 160 lb (72.6 kg), SpO2 97 %, not currently breastfeeding.   General appearance:  [x]  appears age, []  appears older than stated age,     [x]  adequately dressed and groomed, [] disheveled,                []  in no acute distress, [x] appears mildly distressed,      [x]  Other: She has fever blisters around her mouth     MUSCULOSKELETAL:   Gait:   [x] normal, [] antalgic, [] unsteady, [] in bed, gait not evaluated   Station:  [x] erect, [] sitting, [] recumbent, [] other        Strength/tone:  [x] muscle strength and tone appear consistent with age and condition     [] atrophy      [] abnormal movements  PSYCHIATRIC:    Relatedness:  [x] cooperative, [] guarded, [] indifferent, [] hostile,      [] sedated  Speech:  [x] normal prosody, [] pressured, [] decreased volume,    [] slurred, [] halting, [] slowed, [] loud     [] echolalia, [] incoherent, [] stuttering   Eye contact:  [x] direct, [] avoidant, [] intense  Kinetics:  [x] normal, [] increased, [] decreased  Mood:   [] euthymic, [x] depressed, [x] anxious, [] irritable,     [] labile  Affect:   [] normal range, [] constricted, [] depressed, [x] anxious,     [] angry, [] blunted, [] flat  Hallucinations  [x] denies, [] auditory,  [] visual,  [] olfactory, [] tactile  Delusions  [x] none, [] grandiose,  [] jealous,  [] persecutory,  [] somatic,     [] bizarre  Preoccupations  [] none, [] violence, [] obsessions, [] other     Suicidal ideation  [] denies, [x] endorses- passive  Homicidal ideation [x] denies, [] endorses  Thought process: [x] logical, [] circumstantial, [] tangential     [] concrete, [] disorganized  Associations:  [x] logical and coherent, [] loosening, [] disorganized  Insight:   [] good, [x] fair, [] poor  Judgment:  [] good, [x] fair, [] poor  Attention and concentration:     [x] intact, [] limited, [] able to focus on interview,     [] grossly impaired  Orientation:  [x] person, place, time, situation     [] disoriented to:     Memory:  Remote memory [x] intact, [] impaired     Recent memory  [x] intact, [] impaired          Data  Labs:   Admission on 03/21/2022   Component Date Value Ref Range Status    TSH 03/21/2022 2.68  0.27 - 4.20 uIU/mL Final    Vitamin B-12 03/21/2022 467  211 - 911 pg/mL Final    Folate 03/21/2022 >20.00  4.78 - 24.20 ng/mL Final    Comment: Effective 11-15-16 10:00am EST  Please note reference ranges have  changed for Folate.       Cholesterol, Total 03/22/2022 279* 0 - 199 mg/dL Final    Triglycerides 03/22/2022 137  0 - 150 mg/dL Final    HDL 03/22/2022 102* 40 - 60 mg/dL Final    LDL Calculated 03/22/2022 150* <100 mg/dL Final    VLDL Cholesterol Calculated 03/22/2022 27  Not Established mg/dL Final    Hemoglobin A1C 03/22/2022 5.7  See comment % Final    Comment: Comment:  Diagnosis of Diabetes: > or = 6.5%  Increased risk of diabetes (Prediabetes): 5.7-6.4%  Glycemic Control: Nonpregnant Adults: <7.0%                    Pregnant: <6.0%        eAG 03/22/2022 116.9  mg/dL Final    Magnesium 03/22/2022 2.20  1.80 - 2.40 mg/dL Final    Hep C Ab Interp 03/21/2022 Non-reactive  Non-reactive Final Medications  Current Facility-Administered Medications: propranolol (INDERAL) tablet 10 mg, 10 mg, Oral, TID  QUEtiapine (SEROQUEL) tablet 25 mg, 25 mg, Oral, Nightly  prazosin (MINIPRESS) capsule 1 mg, 1 mg, Oral, Nightly  albuterol sulfate  (90 Base) MCG/ACT inhaler 2 puff, 2 puff, Inhalation, Q6H PRN  amLODIPine (NORVASC) tablet 2.5 mg, 2.5 mg, Oral, Daily  cloNIDine (CATAPRES) tablet 0.1 mg, 0.1 mg, Oral, BID  DULoxetine (CYMBALTA) extended release capsule 90 mg, 90 mg, Oral, Daily  lamoTRIgine (LAMICTAL) tablet 50 mg, 50 mg, Oral, Daily  acyclovir (ZOVIRAX) capsule 200 mg, 200 mg, Oral, 5x Daily  medicated lip balm (BLISTEX/CARMEX) stick, , Topical, PRN  acetaminophen (TYLENOL) tablet 650 mg, 650 mg, Oral, Q4H PRN  ibuprofen (ADVIL;MOTRIN) tablet 400 mg, 400 mg, Oral, Q6H PRN  magnesium hydroxide (MILK OF MAGNESIA) 400 MG/5ML suspension 30 mL, 30 mL, Oral, Daily PRN  nicotine polacrilex (COMMIT) lozenge 2 mg, 2 mg, Oral, Q1H PRN  aluminum & magnesium hydroxide-simethicone (MAALOX) 200-200-20 MG/5ML suspension 30 mL, 30 mL, Oral, Q6H PRN  hydrOXYzine (ATARAX) tablet 50 mg, 50 mg, Oral, TID PRN  OLANZapine (ZYPREXA) tablet 10 mg, 10 mg, Oral, Q8H PRN **OR** OLANZapine (ZYPREXA) 10 mg in sterile water 2 mL injection, 10 mg, IntraMUSCular, Q8H PRN  sterile water injection 2.1 mL, 2.1 mL, IntraMUSCular, Q4H PRN  diphenhydrAMINE (BENADRYL) injection 50 mg, 50 mg, IntraMUSCular, Q4H PRN  traZODone (DESYREL) tablet 50 mg, 50 mg, Oral, Nightly PRN  thiamine tablet 100 mg, 100 mg, Oral, Daily  therapeutic multivitamin-minerals 1 tablet, 1 tablet, Oral, Daily  LORazepam (ATIVAN) tablet 1 mg, 1 mg, Oral, Q1H PRN **OR** LORazepam (ATIVAN) injection 1 mg, 1 mg, IntraMUSCular, Q1H PRN **OR** LORazepam (ATIVAN) tablet 2 mg, 2 mg, Oral, Q1H PRN **OR** LORazepam (ATIVAN) injection 2 mg, 2 mg, IntraMUSCular, Q1H PRN **OR** LORazepam (ATIVAN) tablet 3 mg, 3 mg, Oral, Q1H PRN **OR** LORazepam (ATIVAN) injection 3 mg, 3 mg, IntraMUSCular, Q1H PRN **OR** LORazepam (ATIVAN) tablet 4 mg, 4 mg, Oral, Q1H PRN **OR** LORazepam (ATIVAN) injection 4 mg, 4 mg, IntraMUSCular, Q1H PRN    ASSESSMENT AND PLAN    Principal Problem:    Adjustment disorder with mixed anxiety and depressed mood  Active Problems:    Suicidal ideation    Nausea    Primary hypertension    Alcohol use disorder, moderate, in controlled environment ([facility]    Cannabis use disorder, mild, abuse    Borderline personality disorder (HCC)    Depressive disorder    Chronic hepatitis C virus infection ([facility]    PTSD (post-traumatic stress disorder)    Cluster B personality disorder (HCC)    Herpes labialis without complication    Cyst of left ovary  Resolved Problems:    * No resolved hospital problems. *       Not tolerating AM Seroquel. C/O nightmares, severe daytime anxiety related to PTSD. 1.Patient's symptoms show no change  2. Probable discharge is Friday  3. Discharge planning is incomplete  4. Suicidal ideation is unchanged     Plan:  -Decrease HS dose of Seroquel to 25 mg  -D/C AM Seroquel  - Start Prazoson 1 mg QHS  -Start propranolol 10 mg TID for PTSD-related anxiety      Total time with patient was 40 minutes and more than 50 % of that time was spent counseling the patient on their symptoms, treatment and expected goals.

## 2022-03-23 NOTE — BH NOTE
[facility]  Treatment Team Note  Review Date & Time: 3/23/22    Patient  Was not in treatment team      Status EXAM:   Status and Exam  Normal: No  Facial Expression: Worried,Sad  Affect: Incongruent  Level of Consciousness: Alert  Mood:Normal: No  Mood: Depressed,Sad  Motor Activity:Normal: Yes  Interview Behavior: Cooperative  Preception: [place] to Person,[place] to Time  Attention:Normal: Yes  Thought Processes: Circumstantial  Thought Content:Normal: Yes  Hallucinations: None (pt  denies)  Delusions: No  Memory:Normal: Yes  Insight and Judgment: No  Insight and Judgment: Poor Judgment,Poor Insight  Present Suicidal Ideation: No  Present Homicidal Ideation: No      Suicide Risk CSSR-S:  1) Within the past month, have you wished you were dead or wished you could go to sleep and not wake up? : Yes  2) Have you actually had any thoughts of killing yourself? : Yes  3) Have you been thinking about how you might kill yourself? : Yes  5) Have you started to work out or worked out the details of how to kill yourself? Do you intend to carry out this plan? : Yes  6) Have you ever done anything, started to do anything, or prepared to do anything to end your life?: Yes      PLAN/TREATMENT RECOMMENDATIONS UPDATE:   Patient will take medications as prescribed, eat 75% of meals, attend groups, participate in milieu activities, participate in treatment team and care planning for discharge and follow up.           Mouna Moran RN

## 2022-03-23 NOTE — PLAN OF CARE
Problem: Suicide risk  Goal: Provide patient with safe environment  Description: Provide patient with safe environment  Outcome: Ongoing     Problem: Depressive Behavior With or Without Suicide Precautions:  Goal: Able to verbalize acceptance of life and situations over which he or she has no control  Description: Able to verbalize acceptance of life and situations over which he or she has no control  Outcome: Ongoing     Problem: Depressive Behavior With or Without Suicide Precautions:  Goal: Able to verbalize and/or display a decrease in depressive symptoms  Description: Able to verbalize and/or display a decrease in depressive symptoms  Outcome: Ongoing     Problem: Depressive Behavior With or Without Suicide Precautions:  Goal: Ability to disclose and discuss suicidal ideas will improve  Description: Ability to disclose and discuss suicidal ideas will improve  Outcome: Ongoing     Problem: Depressive Behavior With or Without Suicide Precautions:  Goal: Able to verbalize support systems  Description: Able to verbalize support systems  Outcome: Ongoing     Problem: Depressive Behavior With or Without Suicide Precautions:  Goal: Absence of self-harm  Description: Absence of self-harm  Outcome: Ongoing    Patient visible on unit. Social with peers. Attended and participated in groups. She has  been medication compliant. Calm and  cooperative with interview. Her anxiety was high when she awoke and did require PRN medication, but pt has been calmer this afternoon. She states overall her mood has improved since admission and does feel that she will have support with a  when she is discharged. She has been absent of self harm and able to verbalize her needs to staff.

## 2022-03-23 NOTE — FLOWSHEET NOTE
Purposeful Rounding    Patient Location: Day room    Patient willing to engage in conversation: Yes    Presentation/behavior: Cooperative and Pleasant    Affect: Brightens with interaction    Concerns reported: none    PRN medications given: n/a    Environmental assessment: Room free from clutter, Clear path to bathroom  and No safety hazards noted    Fall prevention interventions in place: Yellow non-skid socks on    Daily [place] Fall Risk Score: 79    Daily Blevins Fall Risk Score: 0-low      Electronically signed by Bea Perez RN on 3/23/22 at 4:31 PM EDT

## 2022-03-23 NOTE — GROUP NOTE
Group Therapy Note    Date: 3/22/2022    Group Start Time: 2025  Group End Time: 2040  Group Topic: Wrap-Up    Dilip Rivera RN        Group Therapy Note    Attendees: 9         Patient's Goal: Work on issues    Notes:  Patient able to met her goal    Status After Intervention:  Improved    Participation Level:  Active Listener and Interactive    Participation Quality: Appropriate and Attentive      Speech:  normal      Thought Process/Content: Logical  Linear      Affective Functioning: Congruent      Mood: Appropriate      Level of consciousness:  Alert and Oriented x4      Response to Learning: Able to verbalize current knowledge/experience and Able to verbalize/acknowledge new learning      Endings: None Reported    Modes of Intervention: Support and Socialization      Discipline Responsible: Registered Nurse      Signature:  Augustin Soriano RN

## 2022-03-23 NOTE — GROUP NOTE
Group Therapy Note    Date: 3/23/2022    Group Start Time: 4398  Group End Time: 4698  Group Topic: Psychoeducation    [address] ISAAK Tavares        Group Therapy Note    Attendees: 10         Patient's Goal:  To learn and discuss healthy coping skills that start with each letter of the alphabet and to apply to their lives. Notes:  Pt initially attended group but then was excused to meet with clinician from [facility]. Pt was gone the duration of the group.       Signature:  ISAAK Healy

## 2022-03-23 NOTE — FLOWSHEET NOTE
Purposeful Rounding    Patient Location: Day room    Patient willing to engage in conversation: Yes    Presentation/behavior: Cooperative    Affect: Neutral/Euthymic(normal)    Concerns reported: none    PRN medications given: n/a    Environmental assessment: Room free from clutter, Clear path to bathroom  and No safety hazards noted    Fall prevention interventions in place: Yellow non-skid socks on    Daily [place] Fall Risk Score: 79    Daily [place] Fall Risk Score: 0-low      Electronically signed by Diane Cerna RN on 3/23/22 at 12:15 PM EDT

## 2022-03-23 NOTE — PROGRESS NOTES
Reassessment of PRN Atarax. Patient noted to be resting, eyes closed, respirations even and easy. Medication effective.

## 2022-03-23 NOTE — GROUP NOTE
Group Therapy Note    Date: 3/23/2022    Group Start Time: 0100  Group End Time: 0145  Group Topic: Recreational    [facility]    RADHA Guillen        Group Therapy Note  Clinician introduced the 24 character strengths and the patients identified their top 5 strengths they identified with currently. They discussed how they use those strengths to help them cope with their current situations. Attendees: 7         Patient's Goal:      Notes:  Patient was cooperative and fully engaged in the group discussion and activity. Patient participated in numbering her top 5 strengths and explaining why she chose those strengths. Status After Intervention:  Improved    Participation Level:  Active Listener    Participation Quality: Attentive and Sharing      Speech:  normal      Thought Process/Content: Logical      Affective Functioning: Congruent      Mood: depressed      Level of consciousness:  Alert      Response to Learning: Able to verbalize/acknowledge new learning      Endings: None Reported    Modes of Intervention: Education, Exploration and Activity      Discipline Responsible: /Counselor      Signature:  RADHA Guillen

## 2022-03-23 NOTE — FLOWSHEET NOTE
Purposeful Rounding    Patient Location: Nurses station    Patient willing to engage in conversation: Yes    Presentation/behavior: Anxious    Affect: Anxious    Concerns reported: pt reporting high anxiety when she wakes up    PRN medications given: offered Atarax, CIWA was a 4.     Environmental assessment: Room free from clutter, Clear path to bathroom  and No safety hazards noted    Fall prevention interventions in place: Yellow non-skid socks on    Daily [place] Fall Risk Score: 49    Daily [place] Fall Risk Score: 0-low      Electronically signed by Radha Dooley RN on 3/23/22 at 8:43 AM EDT

## 2022-03-23 NOTE — FLOWSHEET NOTE
03/23/22 1432   Encounter Summary   Services provided to: Patient   Referral/Consult From: Other    Continue Visiting   (3-23, follow-up, prayer, support)   Complexity of Encounter Moderate   Length of Encounter 30 minutes   Routine   Type Follow up   Assessment Calm; Approachable;Fearful   Intervention Active listening;Explored feelings, thoughts, concerns;Nurtured hope;Prayer   Outcome Engaged in conversation;Receptive   Patient states she is fearful that she will become homeless on discharge, which she doesn't think she can handle. She shared her fears and concerns. Offered prayer and ongoing prn support.

## 2022-03-24 VITALS
DIASTOLIC BLOOD PRESSURE: 72 MMHG | RESPIRATION RATE: 16 BRPM | BODY MASS INDEX: 26.66 KG/M2 | HEIGHT: 65 IN | TEMPERATURE: 96.2 F | HEART RATE: 68 BPM | OXYGEN SATURATION: 98 % | WEIGHT: 160 LBS | SYSTOLIC BLOOD PRESSURE: 114 MMHG

## 2022-03-24 PROCEDURE — 6370000000 HC RX 637 (ALT 250 FOR IP): Performed by: PSYCHIATRY & NEUROLOGY

## 2022-03-24 PROCEDURE — 99239 HOSP IP/OBS DSCHRG MGMT >30: CPT | Performed by: PSYCHIATRY & NEUROLOGY

## 2022-03-24 PROCEDURE — 6370000000 HC RX 637 (ALT 250 FOR IP)

## 2022-03-24 PROCEDURE — 5130000000 HC BRIDGE APPOINTMENT

## 2022-03-24 RX ORDER — PRAZOSIN HYDROCHLORIDE 1 MG/1
1 CAPSULE ORAL NIGHTLY
Qty: 30 CAPSULE | Refills: 0 | Status: SHIPPED | OUTPATIENT
Start: 2022-03-24

## 2022-03-24 RX ORDER — PROPRANOLOL HYDROCHLORIDE 10 MG/1
10 TABLET ORAL 3 TIMES DAILY
Qty: 90 TABLET | Refills: 0 | Status: SHIPPED | OUTPATIENT
Start: 2022-03-24

## 2022-03-24 RX ORDER — QUETIAPINE FUMARATE 25 MG/1
25 TABLET, FILM COATED ORAL NIGHTLY
Qty: 30 TABLET | Refills: 0 | Status: SHIPPED | OUTPATIENT
Start: 2022-03-24

## 2022-03-24 RX ORDER — BACITRACIN ZINC AND POLYMYXIN B SULFATE 500; 1000 [USP'U]/G; [USP'U]/G
OINTMENT TOPICAL
Qty: 15 G | Refills: 1 | Status: SHIPPED | OUTPATIENT
Start: 2022-03-24 | End: 2022-03-31

## 2022-03-24 RX ORDER — DULOXETIN HYDROCHLORIDE 30 MG/1
90 CAPSULE, DELAYED RELEASE ORAL DAILY
Qty: 30 CAPSULE | Refills: 0 | Status: SHIPPED | OUTPATIENT
Start: 2022-03-25

## 2022-03-24 RX ADMIN — LAMOTRIGINE 50 MG: 25 TABLET ORAL at 08:53

## 2022-03-24 RX ADMIN — ACETAMINOPHEN 650 MG: 325 TABLET ORAL at 04:40

## 2022-03-24 RX ADMIN — DULOXETINE 90 MG: 30 CAPSULE, DELAYED RELEASE ORAL at 08:53

## 2022-03-24 RX ADMIN — ACYCLOVIR 200 MG: 200 CAPSULE ORAL at 06:34

## 2022-03-24 RX ADMIN — Medication 100 MG: at 08:53

## 2022-03-24 RX ADMIN — CLONIDINE HYDROCHLORIDE 0.1 MG: 0.1 TABLET ORAL at 08:54

## 2022-03-24 RX ADMIN — PROPRANOLOL HYDROCHLORIDE 10 MG: 10 TABLET ORAL at 13:59

## 2022-03-24 RX ADMIN — Medication 1 TABLET: at 08:53

## 2022-03-24 RX ADMIN — ACYCLOVIR 200 MG: 200 CAPSULE ORAL at 13:59

## 2022-03-24 RX ADMIN — ACYCLOVIR 200 MG: 200 CAPSULE ORAL at 00:22

## 2022-03-24 RX ADMIN — AMLODIPINE BESYLATE 2.5 MG: 2.5 TABLET ORAL at 08:54

## 2022-03-24 RX ADMIN — PROPRANOLOL HYDROCHLORIDE 10 MG: 10 TABLET ORAL at 08:54

## 2022-03-24 RX ADMIN — BACITRACIN ZINC AND POLYMYXIN B SULFATE: at 09:00

## 2022-03-24 RX ADMIN — BACITRACIN ZINC AND POLYMYXIN B SULFATE: at 04:42

## 2022-03-24 ASSESSMENT — PAIN SCALES - GENERAL: PAINLEVEL_OUTOF10: 5

## 2022-03-24 NOTE — GROUP NOTE
Group Therapy Note    Date: 3/23/2022    Group Start Time: 7475  Group End Time: 3512  Group Topic: [address], RN        Group Therapy Note    Attendees: 13         Patient's Goal:  Patient did not attend goals group this morning. Notes:  Patient states that today was \"bittersweet\" for her. States that she had multiple panic attacks today and states she \"cried a lot\" which was good for her because she feels she was able to release some emotion. States the nursing staff, specifically her RN on day shift Fani, helped her navigate coping skills. States she is experiencing medication changes and hopes it is beneficial.     Status After Intervention:  Unchanged    Participation Level:  Active Listener and Interactive    Participation Quality: Appropriate, Attentive, Sharing and Supportive      Speech:  normal      Thought Process/Content: Logical  Linear      Affective Functioning: Blunted      Mood: anxious and depressed      Level of consciousness:  Alert, Oriented x4 and Attentive      Response to Learning: Able to verbalize current knowledge/experience, Able to verbalize/acknowledge new learning and Able to retain information      Endings: None Reported    Modes of Intervention: Support, Socialization and Exploration      Discipline Responsible: Registered Nurse      Signature:  Fredi Diaz RN

## 2022-03-24 NOTE — DISCHARGE SUMMARY
Discharge Summary     Admit Date: 3/21/2022   Discharge Date:    3/24/2022       Spent over 40 minutes with patient and staff on [facility]       Final Dx: axis I: Adjustment disorder with mixed anxiety and depressed mood     And Present on Admission:   Depressive disorder   Adjustment disorder with mixed anxiety and depressed mood   (Resolved) Suicidal ideation   (Resolved) Nausea   Alcohol use disorder, moderate, in controlled environment ([address]   Cannabis use disorder, mild, abuse   Cluster B personality disorder ([address]   PTSD (post-traumatic stress disorder)   Borderline personality disorder ([address]   Herpes labialis without complication   (Resolved) Primary hypertension   Cyst of left ovary       Active Hospital Problems    Diagnosis Date Noted    Adjustment disorder with mixed anxiety and depressed mood [F43.23] 03/22/2022    Cluster B personality disorder ([address] [F60.89] 03/22/2022    PTSD (post-traumatic stress disorder) [F43.10] 03/22/2022    Herpes labialis without complication [P95.7] 05/19/9315    Cyst of left ovary [N83.202]     Depressive disorder [F32.9] 03/21/2022    Borderline personality disorder ([address] [F60.3] 11/28/2021    Alcohol use disorder, moderate, in controlled environment ([address] [F10.20] 07/19/2021    Cannabis use disorder, mild, abuse [F12.10] 07/19/2021     Condition on DC  Mood and affect are stable and pt is not suicidal     VITALS:  BP (!) 142/76   Pulse 63   Temp 96.2 °F (35.7 °C) (Temporal)   Resp 16   Ht 5' 5\" (1.651 m)   Wt 160 lb (72.6 kg)   LMP  (LMP Unknown)   SpO2 98%   Breastfeeding No   BMI 26.63 kg/m²      Brief Summary Present Illness       Ms. Casanova is a 55year old female with a history of major depressive disorder, PTSD, Borderline personality disorder and alcoholism who reports that for one week she has felt severely depressed and anxious, not sleeping but 3 hours per night but fatigued in the AM, anhedonia, decreased concentration, decreased appetite, suicidal thoughts. This is directly due to having been evicted.      In February she got COVID and got behind in her rent. She is terrified of being homeless and is worried that she will have to live on the street. She requests benzodiazepines for anxiety.       She became so stressed that 4 days ago she started drinking again. She had been sober for three months prior to that, and she is feeling very embarrassed about having relapsed.     She denies symptoms of tigre or psychosis. Hospital Course  Patient stabilized on meds and milieu treatment. Patient was discharged to home to continue recovery in the community. Mando Guevara was feeling intense dysphoria due to a combination of the drinking and fear of being homeless once she is evicted. This improved readily. Mando Guevara complained of the following PTSD symptoms and requested that they be treated in the hospital:  Nightmares, hypervigilance, anxiety, flashbacks. She was started on prazosin 1mg QHS, which was successful in enabling her to have a full night of dreamless sleep. She was very pleased and wants to continue it. She requested that her Seroquel dose be decreased, which was done. In addition, due to her constant anxiety and hyperadrenergic symptoms connected to her PTSD, propranolol 10 mg TID was started, which was successful. Mando Guevara felt good at discharge and was grateful for the treatment. She intends to follow-up with [facility]. PE: (reviewed) and labs (see medical H&PE)  Labs:    Admission on 03/21/2022   Component Date Value Ref Range Status    TSH 03/21/2022 2.68  0.27 - 4.20 uIU/mL Final    Vitamin B-12 03/21/2022 467  211 - 911 pg/mL Final    Folate 03/21/2022 >20.00  4.78 - 24.20 ng/mL Final    Comment: Effective 11-15-16 10:00am EST  Please note reference ranges have  changed for Folate.       Cholesterol, Total 03/22/2022 279* 0 - 199 mg/dL Final    Triglycerides 03/22/2022 137  0 - 150 mg/dL Final    HDL 03/22/2022 102* 40 - 60 mg/dL Final    LDL Calculated 03/22/2022 150* <100 mg/dL Final    VLDL Cholesterol Calculated 03/22/2022 27  Not Established mg/dL Final    Hemoglobin A1C 03/22/2022 5.7  See comment % Final    Comment: Comment:  Diagnosis of Diabetes: > or = 6.5%  Increased risk of diabetes (Prediabetes): 5.7-6.4%  Glycemic Control: Nonpregnant Adults: <7.0%                    Pregnant: <6.0%        eAG 03/22/2022 116.9  mg/dL Final    Magnesium 03/22/2022 2.20  1.80 - 2.40 mg/dL Final    Hep C Ab Interp 03/21/2022 Non-reactive  Non-reactive Final    Hep A IgM 03/23/2022 Non-reactive  Non-reactive Final    Hep B Core Ab, IgM 03/23/2022 Non-reactive  Non-reactive Final    Hep B S Ag Interp 03/23/2022 Non-reactive  Non-reactive Final    Hep C Ab Interp 03/23/2022 Non-reactive  Non-reactive Final        Mental Status Exam at Discharge:  Level of consciousness:  awake  Appearance:  well-appearing, in chair, good grooming and good hygiene well-developed, well-nourished  Behavior/Motor:  no abnormalities noted normal gait and station AIMS: 0  Attitude toward examiner:  cooperative, attentive and good eye contact  Speech:  spontaneous, normal rate, normal volume and well articulated  Mood:  euthymic  Affect:  mood congruent, Anxiety: mild  Hallucinations: Absent  Thought processes:  coherent Attention span, Concentration & Attention:  attention span and concentration were age appropriate  Thought content:  No evidence of delusions OCD: none    Insight: normal insight and judgment Cognition:  oriented to person, place, and time  Fund of Knowledge: average  IQ:average Memory: intact  Suicide:  No specific plan to harm self  Sleep: sleeps through the night  Appetite: ok     Reassess [place] Risk:  no plan to harm self Pt has phone numbers to contact if suicidal thoughts recur and states pt will return to the hospital if suicidal feelings return.      Hospital Routine Meds:     propranolol  10 mg Oral TID    QUEtiapine  25 mg Oral Nightly    prazosin  1 mg Oral Nightly    bacitracin-polymyxin b   Topical 4x Daily    amLODIPine  2.5 mg Oral Daily    cloNIDine  0.1 mg Oral BID    DULoxetine  90 mg Oral Daily    lamoTRIgine  50 mg Oral Daily    acyclovir  200 mg Oral 5x Daily    thiamine  100 mg Oral Daily    multivitamin  1 tablet Oral Daily      Hospital PRN Meds: albuterol sulfate HFA, acetaminophen, ibuprofen, magnesium hydroxide, nicotine polacrilex, aluminum & magnesium hydroxide-simethicone, hydrOXYzine, OLANZapine **OR** OLANZapine (ZyPREXA) in sterile water IntraMUSCular, sterile water, diphenhydrAMINE, LORazepam **OR** LORazepam **OR** LORazepam **OR** LORazepam **OR** LORazepam **OR** LORazepam **OR** LORazepam **OR** LORazepam       Discharge Meds:    Current Discharge Medication List           Details   prazosin (MINIPRESS) 1 MG capsule Take 1 capsule by mouth nightly  Qty: 30 capsule, Refills: 0      propranolol (INDERAL) 10 MG tablet Take 1 tablet by mouth 3 times daily  Qty: 90 tablet, Refills: 0      bacitracin-polymyxin b (POLYSPORIN) [id number] UNIT/GM ointment Apply topically 2 times daily. Qty: 15 g, Refills: 1              Details   DULoxetine (CYMBALTA) 30 MG extended release capsule Take 3 capsules by mouth daily  Qty: 30 capsule, Refills: 0      QUEtiapine (SEROQUEL) 25 MG tablet Take 1 tablet by mouth at bedtime  Qty: 30 tablet, Refills: 0              Details   acetaminophen (TYLENOL) 500 MG tablet Take 1 tablet by mouth 4 times daily as needed for Pain  Qty: 40 tablet, Refills: 0      ibuprofen (ADVIL;MOTRIN) 400 MG tablet Take 1 tablet by mouth every 8 hours as needed for Pain  Qty: 20 tablet, Refills: 0      albuterol sulfate  (90 Base) MCG/ACT inhaler Use 2 puffs 4 times daily for 7 days then as needed for wheezing. Dispense with Spacer and instruct in use. At patient's preference may use 60 dose MDI. May Sub Pro-Air or Proventil as needed per insurance.   Qty: 18 g, Refills: 0 lamoTRIgine (LAMICTAL) 25 MG tablet Take 2 tablets by mouth daily  Qty: 60 tablet, Refills: 0      sennosides-docusate sodium (SENOKOT-S) 8.6-50 MG tablet Take 2 tablets by mouth daily  Qty: 60 tablet, Refills: 0      amLODIPine (NORVASC) 2.5 MG tablet Take 1 tablet by mouth daily  Qty: 30 tablet, Refills: 0      cloNIDine (CATAPRES) 0.1 MG tablet Take 1 tablet by mouth 2 times daily  Qty: 60 tablet, Refills: 0                 Multiple Neuroleptics? Previous?  Clozaril --N/A    Disposition - Residence     Follow Up:  See Discharge Instructions --[facility]

## 2022-03-24 NOTE — PLAN OF CARE
Problem: Depressive Behavior With or Without Suicide Precautions:  Goal: Able to verbalize acceptance of life and situations over which he or she has no control  Description: Able to verbalize acceptance of life and situations over which he or she has no control  3/24/2022 0956 by Ottoniel Newberry LPN  Outcome: Ongoing     Problem: Depressive Behavior With or Without Suicide Precautions:  Goal: Able to verbalize and/or display a decrease in depressive symptoms  Description: Able to verbalize and/or display a decrease in depressive symptoms  3/24/2022 0956 by Ottoniel Newberry LPN  Outcome: Ongoing     Problem: Depressive Behavior With or Without Suicide Precautions:  Goal: Absence of self-harm  Description: Absence of self-harm  Outcome: Ongoing    Pt. Stated she denies all. Wants to go home, believes that this place does help. Compliant with meds. Participates in groups. No other concerns at this time.

## 2022-03-24 NOTE — GROUP NOTE
Group Therapy Note    Date: 3/24/2022    Group Start Time: 1000  Group End Time: 0118  Group Topic: Relaxation    [id number] [facility]        Group Therapy Note    Attendees: 12    Group members took the first 10 minutes of group to listen to a video that walked them through meditation. After, group members traced their hands and wrote compliments on other members hands. The goal was to increase self-esteem through positive affirmations. Notes:  La attended group part of the time due to meeting with a member of her treatment team. Camila Bell remained engaged and interacted appropriately with others in the group. Status After Intervention:  Improved    Participation Level:  Active Listener and Interactive    Participation Quality: Appropriate, Attentive and Sharing      Speech:  normal      Thought Process/Content: Logical      Affective Functioning: Congruent      Mood: Brightened, Engaging     Level of consciousness:  Alert, Oriented x4 and Attentive      Response to Learning: Able to verbalize current knowledge/experience      Endings: None Reported    Modes of Intervention: Socialization, Exploration and Activity      Discipline Responsible: Behavorial Health Tech      Signature:  RADHA Hubbard

## 2022-03-24 NOTE — FLOWSHEET NOTE
03/24/22 1239   Encounter Summary   Services provided to: Patient   Referral/Consult From: Rounding   Continue Visiting   (3/24 follow up, sppt and prayer)   Complexity of Encounter Moderate   Length of Encounter 15 minutes   Spiritual/Faith   Type Spiritual support   Assessment Approachable;Grieving; Hopeful;Coping   Intervention Active listening;Explored feelings, thoughts, concerns;Prayer   Outcome Comfort;Expressed gratitude;Engaged in conversation;Expressed feelings/needs/concerns

## 2022-03-24 NOTE — DISCHARGE SUMMARY
[facility]  Discharge Note    Pt discharged with followings belongings:   Dental Appliances: None  Vision - Corrective Lenses: Glasses  Hearing Aid: None  Jewelry: None  Body Piercings Removed: N/A  Clothing: Footwear,Shirt,Pants,Socks  Were All Patient Medications Collected?: Not Applicable  Other Valuables: Cell phone,Wallet,Keys (4 CC)   Valuables sent home with pt or returned to patient. Patient education on aftercare instructions: yes  Information faxed to [facility]  at 12:37 PM .Patient verbalize understanding of AVS:  yes.     Status EXAM upon discharge:  Status and Exam  Normal: No  Facial Expression: Brightened  Affect: Appropriate,Constricted  Level of Consciousness: Alert  Mood:Normal: No  Mood: Depressed,Sad,Anxious  Motor Activity:Normal: Yes  Interview Behavior: Cooperative  Preception: [place] to Person,[place] to Time,[place] to Place,[place] to Situation  Attention:Normal: Yes  Thought Processes:  (linear)  Thought Content:Normal: No  Hallucinations: None  Delusions: No  Memory:Normal: Yes  Insight and Judgment: No  Insight and Judgment: Poor Judgment,Poor Insight  Present Suicidal Ideation: No  Present Homicidal Ideation: No      Metabolic Screening:    Lab Results   Component Value Date    LABA1C 5.7 03/22/2022       Lab Results   Component Value Date    CHOL 279 (H) 03/22/2022    CHOL 227 (H) 07/18/2021    CHOL 199 11/28/2020    CHOL 203 (H) 02/02/2010     Lab Results   Component Value Date    TRIG 137 03/22/2022    TRIG 132 07/18/2021    TRIG 95 11/28/2020    TRIG 94 02/02/2010     Lab Results   Component Value Date     (H) 03/22/2022    HDL 78 (H) 07/18/2021    HDL 73 (H) 11/28/2020    HDL 57 02/02/2010     No components found for: [facility]  Lab Results   Component Value Date    LABVLDL 27 03/22/2022    LABVLDL 26 07/18/2021    LABVLDL 19 11/28/2020    LABVLDL 19 02/02/2010       Alicia Galvan LPN    Bridge Appointment completed: Reviewed Discharge Instructions with patient. Patient verbalizes understanding and agreement with the discharge plan using the teachback method. Referral for Outpatient Tobacco Cessation Counseling, upon discharge (jacobo X if applicable and completed):     [facility]  Hospital staff assisted patient to call Quit Line or faxed referral                                   during hospitalization                  (X )  Recognizing danger situations (included triggers and roadblocks), if not completed on admission                    (X )  Coping skills (new ways to manage stress, exercise, relaxation techniques, changing routine, distraction), if not completed on admission                                                           ( X)  Basic information about quitting (benefits of quitting, techniques in how to quit, available resources, if not completed on admission  (X ) Referral for counseling faxed to [facility]   ( ) Patient refused referral  ( ) Patient refused counseling  ( ) Patient refused smoking cessation medication upon discharge    Vaccinations (jacobo X if applicable and completed):  ( ) Patient states already received influenza vaccine elsewhere  ( ) Patient received influenza vaccine during this hospitalization  ( X) Patient refused influenza vaccine at this time

## 2022-03-24 NOTE — PLAN OF CARE
Problem: Suicide risk  Goal: Provide patient with safe environment  Description: Provide patient with safe environment  Outcome: Ongoing     Problem: Depressive Behavior With or Without Suicide Precautions:  Goal: Able to verbalize acceptance of life and situations over which he or she has no control  Description: Able to verbalize acceptance of life and situations over which he or she has no control  Outcome: Ongoing     Problem: Depressive Behavior With or Without Suicide Precautions:  Goal: Able to verbalize and/or display a decrease in depressive symptoms  Description: Able to verbalize and/or display a decrease in depressive symptoms  Outcome: Ongoing     Problem: Depressive Behavior With or Without Suicide Precautions:  Goal: Ability to disclose and discuss suicidal ideas will improve  Description: Ability to disclose and discuss suicidal ideas will improve  Outcome: Ongoing     Problem: Depressive Behavior With or Without Suicide Precautions:  Goal: Able to verbalize support systems  Description: Able to verbalize support systems  Outcome: Ongoing   Pt was out on the unit, watching TV with peers, attended group. CIWA 2. Except for slight anxiety pt did not score. Verbal in 1 to 1. Talked about having to move, which was very frightening for her. She began to drink again. States she has a tendency to premedicate herself with ETOH. States she did not cutting. States she has been depressed. Appetite has decreased and sleep has been bad. States last night she feels she took too much Seroquel. Tonight when she awakened in the night, said she feels much better with lower dose. She said she is anxious about the whole situation but she has 2 friends that she feels are supportive and have told her she will never be homeless.

## 2022-03-26 LAB
HCV QNT BY NAAT IU/ML: NOT DETECTED IU/ML
HCV QNT BY NAAT LOG IU/ML: NOT DETECTED LOG IU/ML
INTERPRETATION: NOT DETECTED

## 2022-05-09 NOTE — PLAN OF CARE
Patient was given tylenol 650 mg po, per request, for complaint of headache.  Marely Dominguez R.N.
Problem: Altered Mood, Depressive Behavior:  Goal: Ability to disclose and discuss suicidal ideas will improve  Description: Ability to disclose and discuss suicidal ideas will improve  Outcome: Ongoing  Note: Pt denies suicidal ideation. Problem: Altered Mood, Depressive Behavior:  Goal: Absence of self-harm  Description: Absence of self-harm  11/30/2020 1043 by Shira Simmons RN  Outcome: Ongoing  Note: No self harm thus far into shift.
Problem: Altered Mood, Depressive Behavior:  Goal: Able to verbalize acceptance of life and situations over which he or she has no control  Description: Able to verbalize acceptance of life and situations over which he or she has no control  Outcome: Ongoing  Note: Able to verbalize feelings related to loss of parents. \"I can do this\". Has been social in the milieu. Reports the prn medication have been effective in managing withdraw symptoms.       Problem: Altered Mood, Depressive Behavior:  Goal: Able to verbalize and/or display a decrease in depressive symptoms  Description: Able to verbalize and/or display a decrease in depressive symptoms  11/28/2020 1640 by Mukul Shen LPN  Outcome: Met This Shift     Problem: Altered Mood, Depressive Behavior:  Goal: Ability to disclose and discuss suicidal ideas will improve  Description: Ability to disclose and discuss suicidal ideas will improve  Outcome: Met This Shift     Problem: Altered Mood, Depressive Behavior:  Goal: Absence of self-harm  Description: Absence of self-harm  Outcome: Met This Shift
Problem: Altered Mood, Depressive Behavior:  Goal: Able to verbalize acceptance of life and situations over which he or she has no control  Outcome: Ongoing     Problem: Altered Mood, Depressive Behavior:  Goal: Able to verbalize and/or display a decrease in depressive symptoms  Outcome: Ongoing     Problem: Altered Mood, Depressive Behavior:  Goal: Ability to disclose and discuss suicidal ideas will improve  Outcome: Ongoing     Problem: Altered Mood, Depressive Behavior:  Goal: Able to verbalize support systems  Outcome: Ongoing     Problem: Altered Mood, Depressive Behavior:  Goal: Absence of self-harm  Outcome: Ongoing     Problem: Altered Mood, Depressive Behavior:  Goal: Participates in care planning  Outcome: Ongoing
Problem: Altered Mood, Depressive Behavior:  Goal: Able to verbalize and/or display a decrease in depressive symptoms  Description: Able to verbalize and/or display a decrease in depressive symptoms  Outcome: Ongoing   La has been out in the day room and attended evening group this shift. Patient denies current SI/HI, denies A/V/H. PRN Bentyl requested/given for stomach cramps, PRN Milk of Mag requested/given for constipation. PRN Bentyl effective. Patient reports minimal withdrawal symptoms.
Problem: Altered Mood, Depressive Behavior:  Goal: Absence of self-harm  Description: Absence of self-harm  11/29/2020 0505 by Humberto Summers RN  Outcome: Ongoing   Severa Ship reports she is feeling much better today. Denies current SI/HI, denies A/VH. Attended wrap up group and absent of self harm. Reports PRN medications effective at managing her withdrawal symptoms.
Problem: Altered Mood, Depressive Behavior:  Goal: Absence of self-harm  Description: Absence of self-harm  11/30/2020 2130 by Mamie Yang RN  Outcome: Ongoing     Problem: Altered Mood, Depressive Behavior:  Goal: Ability to disclose and discuss suicidal ideas will improve  Description: Ability to disclose and discuss suicidal ideas will improve  11/30/2020 2130 by Mamie Yang RN  Outcome: Ongoing   Patient denies SI/HI. Patient visible and social on unit. Patient doesn't want to talk about why she is here. Patient having trouble dealing with her grief. Talked about writing a letter so she can get some closure on the situation. Patient does say that she believes she is improving. Patient states, \"It isn't my depression, my meds work. I just wasn't dealing with it. \"
Problem: Altered Mood, Depressive Behavior:  Goal: Absence of self-harm  Description: Absence of self-harm  Outcome: Ongoing  La has been out in the day room and attended wrap-up group this shift. Patient denies current SI/HI, denies A/V/H and remains absent of self harm. Medication compliant. Denies withdrawal symptoms. States the symptoms are worse in the middle of the night or early in the morning. Will continue to monitor.
Negative

## 2022-08-16 NOTE — GROUP NOTE
Group Therapy Note    Date: 10/17/2021    Group Start Time: 9:30 AM  Group End Time: 10:00 AM   Group Topic: [id number], Beth Alok        Group Therapy Note    Attendees: 11       Patient's Goal: Stretching and yoga    Notes: Pt was cooperative and appropriate. Status After Intervention:  Improved    Participation Level:  Active Listener and Interactive    Participation Quality: Appropriate, Attentive, Sharing and Supportive      Speech:  normal      Thought Process/Content: Logical  Linear      Affective Functioning: Congruent      Mood: euthymic      Level of consciousness:  Alert, Oriented x4 and Attentive      Response to Learning: Able to verbalize current knowledge/experience and Able to verbalize/acknowledge new learning      Endings: None Reported    Modes of Intervention: Socialization and Activity      Discipline Responsible: /Counselor      Signature:  STEPHAN Damon Stroke Week 2 Survey    Flowsheet Row Responses   [facility] facility patient discharged from? [facility]   Does the patient have one of the following disease processes/diagnoses(primary or secondary)? Stroke (TIA)   Week 2 attempt successful? No   Revoke Readmitted          DB SAMANIEGO - Registered Nurse

## 2022-10-16 ENCOUNTER — HOSPITAL ENCOUNTER (EMERGENCY)
Age: 57
Discharge: ANOTHER HOSPITAL | End: 2022-10-17
Attending: EMERGENCY MEDICINE

## 2022-10-16 DIAGNOSIS — F10.920 ACUTE ALCOHOLIC INTOXICATION WITHOUT COMPLICATION (HCC): ICD-10-CM

## 2022-10-16 DIAGNOSIS — R45.851 SUICIDAL IDEATION: Primary | ICD-10-CM

## 2022-10-16 LAB
A/G RATIO: 1.7 (ref 1.1–2.2)
ACETAMINOPHEN LEVEL: <5 UG/ML (ref 10–30)
ALBUMIN SERPL-MCNC: 4.6 G/DL (ref 3.4–5)
ALP BLD-CCNC: 112 U/L (ref 40–129)
ALT SERPL-CCNC: 49 U/L (ref 10–40)
AMPHETAMINE SCREEN, URINE: ABNORMAL
ANION GAP SERPL CALCULATED.3IONS-SCNC: 13 MMOL/L (ref 3–16)
AST SERPL-CCNC: 39 U/L (ref 15–37)
BARBITURATE SCREEN URINE: ABNORMAL
BASOPHILS ABSOLUTE: 0.1 K/UL (ref 0–0.2)
BASOPHILS RELATIVE PERCENT: 0.9 %
BENZODIAZEPINE SCREEN, URINE: ABNORMAL
BILIRUB SERPL-MCNC: 0.6 MG/DL (ref 0–1)
BUN BLDV-MCNC: 6 MG/DL (ref 7–20)
CALCIUM SERPL-MCNC: 9.7 MG/DL (ref 8.3–10.6)
CANNABINOID SCREEN URINE: POSITIVE
CHLORIDE BLD-SCNC: 102 MMOL/L (ref 99–110)
CO2: 21 MMOL/L (ref 21–32)
COCAINE METABOLITE SCREEN URINE: ABNORMAL
CREAT SERPL-MCNC: 0.7 MG/DL (ref 0.6–1.1)
EOSINOPHILS ABSOLUTE: 0.2 K/UL (ref 0–0.6)
EOSINOPHILS RELATIVE PERCENT: 2.5 %
ETHANOL: 47 MG/DL (ref 0–0.08)
ETHANOL: 99 MG/DL (ref 0–0.08)
FENTANYL SCREEN, URINE: ABNORMAL
GFR AFRICAN AMERICAN: >60
GFR NON-AFRICAN AMERICAN: >60
GLUCOSE BLD-MCNC: 89 MG/DL (ref 70–99)
HCG QUALITATIVE: NEGATIVE
HCT VFR BLD CALC: 42 % (ref 36–48)
HEMOGLOBIN: 14.7 G/DL (ref 12–16)
LYMPHOCYTES ABSOLUTE: 2.8 K/UL (ref 1–5.1)
LYMPHOCYTES RELATIVE PERCENT: 39.3 %
Lab: ABNORMAL
MCH RBC QN AUTO: 35.2 PG (ref 26–34)
MCHC RBC AUTO-ENTMCNC: 34.9 G/DL (ref 31–36)
MCV RBC AUTO: 100.9 FL (ref 80–100)
METHADONE SCREEN, URINE: ABNORMAL
MONOCYTES ABSOLUTE: 0.5 K/UL (ref 0–1.3)
MONOCYTES RELATIVE PERCENT: 6.4 %
NEUTROPHILS ABSOLUTE: 3.7 K/UL (ref 1.7–7.7)
NEUTROPHILS RELATIVE PERCENT: 50.9 %
OPIATE SCREEN URINE: ABNORMAL
OXYCODONE URINE: ABNORMAL
PDW BLD-RTO: 12.6 % (ref 12.4–15.4)
PH UA: 6.5
PHENCYCLIDINE SCREEN URINE: ABNORMAL
PLATELET # BLD: 273 K/UL (ref 135–450)
PMV BLD AUTO: 8 FL (ref 5–10.5)
POTASSIUM REFLEX MAGNESIUM: 3.6 MMOL/L (ref 3.5–5.1)
RBC # BLD: 4.16 M/UL (ref 4–5.2)
SALICYLATE, SERUM: <0.3 MG/DL (ref 15–30)
SARS-COV-2, NAAT: NOT DETECTED
SODIUM BLD-SCNC: 136 MMOL/L (ref 136–145)
TOTAL PROTEIN: 7.3 G/DL (ref 6.4–8.2)
WBC # BLD: 7.3 K/UL (ref 4–11)

## 2022-10-16 PROCEDURE — 6370000000 HC RX 637 (ALT 250 FOR IP): Performed by: EMERGENCY MEDICINE

## 2022-10-16 PROCEDURE — 80053 COMPREHEN METABOLIC PANEL: CPT

## 2022-10-16 PROCEDURE — 99285 EMERGENCY DEPT VISIT HI MDM: CPT

## 2022-10-16 PROCEDURE — 82077 ASSAY SPEC XCP UR&BREATH IA: CPT

## 2022-10-16 PROCEDURE — 80179 DRUG ASSAY SALICYLATE: CPT

## 2022-10-16 PROCEDURE — 87635 SARS-COV-2 COVID-19 AMP PRB: CPT

## 2022-10-16 PROCEDURE — 85025 COMPLETE CBC W/AUTO DIFF WBC: CPT

## 2022-10-16 PROCEDURE — 80307 DRUG TEST PRSMV CHEM ANLYZR: CPT

## 2022-10-16 PROCEDURE — 80143 DRUG ASSAY ACETAMINOPHEN: CPT

## 2022-10-16 PROCEDURE — 84703 CHORIONIC GONADOTROPIN ASSAY: CPT

## 2022-10-16 RX ORDER — OLANZAPINE 5 MG/1
10 TABLET, ORALLY DISINTEGRATING ORAL
Status: DISCONTINUED | OUTPATIENT
Start: 2022-10-16 | End: 2022-10-17 | Stop reason: HOSPADM

## 2022-10-16 RX ORDER — DULOXETIN HYDROCHLORIDE 30 MG/1
30 CAPSULE, DELAYED RELEASE ORAL ONCE
Status: COMPLETED | OUTPATIENT
Start: 2022-10-16 | End: 2022-10-16

## 2022-10-16 RX ORDER — ONDANSETRON 4 MG/1
4 TABLET, ORALLY DISINTEGRATING ORAL ONCE
Status: COMPLETED | OUTPATIENT
Start: 2022-10-16 | End: 2022-10-16

## 2022-10-16 RX ORDER — LAMOTRIGINE 25 MG/1
25 TABLET ORAL ONCE
Status: COMPLETED | OUTPATIENT
Start: 2022-10-16 | End: 2022-10-16

## 2022-10-16 RX ORDER — QUETIAPINE FUMARATE 25 MG/1
25 TABLET, FILM COATED ORAL ONCE
Status: COMPLETED | OUTPATIENT
Start: 2022-10-16 | End: 2022-10-16

## 2022-10-16 RX ADMIN — DULOXETINE HYDROCHLORIDE 30 MG: 30 CAPSULE, DELAYED RELEASE ORAL at 14:55

## 2022-10-16 RX ADMIN — QUETIAPINE FUMARATE 25 MG: 25 TABLET ORAL at 14:56

## 2022-10-16 RX ADMIN — ONDANSETRON 4 MG: 4 TABLET, ORALLY DISINTEGRATING ORAL at 14:55

## 2022-10-16 RX ADMIN — LAMOTRIGINE 25 MG: 25 TABLET ORAL at 14:55

## 2022-10-16 ASSESSMENT — PAIN SCALES - GENERAL: PAINLEVEL_OUTOF10: 0

## 2022-10-16 ASSESSMENT — PAIN - FUNCTIONAL ASSESSMENT: PAIN_FUNCTIONAL_ASSESSMENT: 0-10
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
## 2022-10-16 NOTE — ED NOTES
Pt resting quietly in bed with blankets pulled up and lights turned off. Remains calm and cooperative.       [address], RN  10/16/22 0115

## 2022-10-16 NOTE — ED NOTES
Pt's clothing removed except for underwear, socks, one beaded bracelet and a hair scrunchie and then placed in a paper gown. Pt's room cleared of everything except for stretcher and blankets. Pt's shirt, pants and shoes placed in a pt belonging bag. Pt belonging bag and her duffel bag containing more clothes, cigarettes, medication bottles and \"other necessities\" labeled with a pt sticker and placed in [address].       Lili Alanis RN  10/16/22 5926

## 2022-10-16 NOTE — ED NOTES
Pt ambulated with ease to bathroom to provide a urine specimen. Returned calmly to bed.       Leslee Umaña RN  10/16/22 3313

## 2022-10-17 VITALS
SYSTOLIC BLOOD PRESSURE: 171 MMHG | HEART RATE: 80 BPM | TEMPERATURE: 97.8 F | OXYGEN SATURATION: 98 % | BODY MASS INDEX: 28.8 KG/M2 | WEIGHT: 173.06 LBS | DIASTOLIC BLOOD PRESSURE: 107 MMHG | RESPIRATION RATE: 16 BRPM

## 2022-10-17 PROCEDURE — 6370000000 HC RX 637 (ALT 250 FOR IP): Performed by: EMERGENCY MEDICINE

## 2022-10-17 RX ORDER — LORAZEPAM 1 MG/1
1 TABLET ORAL ONCE
Status: COMPLETED | OUTPATIENT
Start: 2022-10-17 | End: 2022-10-17

## 2022-10-17 RX ADMIN — LORAZEPAM 1 MG: 1 TABLET ORAL at 04:12

## 2022-10-17 NOTE — ED NOTES
Pt was awake for most of the night, wake up at this time, asking for an update on POC, pt  Given update, pt also asking for  meds to help with anxiety, the is not comfortable with  Zyprexa at this time, MD made aware and  Ane time ativan ordered and given per [facility]. Pt ambulated to bathroom and back with steady gait,  VS updated please see flow sheets for full set.       Aubree Peña, SADAF  10/17/22 6793

## 2022-10-17 NOTE — ED NOTES
Placed a breakfast safety tray order for patient. Patient states she is not in need of anything at this time.       Bhakti FRAUSTO  10/17/22 3990

## 2022-10-17 NOTE — ED NOTES
Report received from out going RN, pt resting in bed with sitter at bedside. Pt is in in no acute distress. Pt UP dated on POC. VS updated please see flow sheets for full set.       Davian Anderson RN  10/16/22 2007

## 2022-10-17 NOTE — ED NOTES
Transport here at bedside to take patient to appropriate facility. Transport company given patients belongings. No issues or complaints at this time.       Page Kaela FRAUSTO  10/17/22 0045

## 2022-10-17 NOTE — ED NOTES
Pt leaving at this time with [facility] transport. Pt in no acute distress upon leaving the ED.       Gay Santana RN  10/17/22 0384

## 2022-10-17 NOTE — ED NOTES
REPORT CALLED TO [place], RN AT [facility]. PT GOING TO BED 6061. ETA FOR [facility] WAS AT 0900. WAITING FOR THEM TO TRANSPORT PATIENT.      Rachel Jaime RN  10/17/22 4516

## 2022-10-17 NOTE — ED NOTES
Pt resting in bed at this time, laying in a supine position with head of bed elevated . no distress noted. RR even and unlabored, skin warm and dry. No needs at this time. Will continue to monitor closely.        Macy Taveras RN  10/17/22 5332

## 2022-10-17 NOTE — ED NOTES
Patients safety breakfast meal tray arrived. Patient currently sitting up eating breakfast, with no issues or complaints at this time.       [facility]  10/17/22 7586

## 2022-12-16 ENCOUNTER — HOSPITAL ENCOUNTER (EMERGENCY)
Age: 57
Discharge: HOME | End: 2022-12-16

## 2022-12-16 VITALS
HEIGHT: 64 IN | TEMPERATURE: 97.3 F | BODY MASS INDEX: 27.89 KG/M2 | HEART RATE: 67 BPM | OXYGEN SATURATION: 95 % | DIASTOLIC BLOOD PRESSURE: 107 MMHG | SYSTOLIC BLOOD PRESSURE: 203 MMHG | WEIGHT: 163.36 LBS | RESPIRATION RATE: 16 BRPM

## 2022-12-16 DIAGNOSIS — R03.0 ELEVATED BLOOD PRESSURE READING: ICD-10-CM

## 2022-12-16 DIAGNOSIS — Z76.0 ENCOUNTER FOR MEDICATION REFILL: Primary | ICD-10-CM

## 2022-12-16 PROCEDURE — 99283 EMERGENCY DEPT VISIT LOW MDM: CPT

## 2022-12-16 RX ORDER — DULOXETIN HYDROCHLORIDE 20 MG/1
60 CAPSULE, DELAYED RELEASE ORAL DAILY
Qty: 90 CAPSULE | Refills: 0 | Status: SHIPPED | OUTPATIENT
Start: 2022-12-16 | End: 2023-01-15

## 2022-12-16 ASSESSMENT — PAIN - FUNCTIONAL ASSESSMENT: PAIN_FUNCTIONAL_ASSESSMENT: NONE - DENIES PAIN

## 2022-12-16 NOTE — ED PROVIDER NOTES
[address]  [address] [address]  Dept: [phone]  Loc: [address] ENCOUNTER        This patient was not seen or evaluated by the attending physician. I evaluated this patient, the attending physician was available for consultation. CHIEF COMPLAINT    Chief Complaint   Patient presents with    Medication Refill     Pt arrives ambulatory for refill of duloxetine. Pt sts was getting it filled at [facility] but hey will not see her without insurance. HPI    Kat Santos is a 65 y.o. female who presents to the emergency department requesting a medication refill for Cymbalta. She states she has 1 pill left. She has a history of anxiety and depression which is why she is taking the Cymbalta. She lost her insurance which is why she is coming here. She actually went to an urgent care this morning asking for refill in which they told her to come to the ER to get it. She is requesting a prescription so that she can take it to the [facility] to get it filled. She denies lightheadedness, dizziness, anxiousness, chest pain, shortness of breath, suicidal ideation or intent to self-harm.      REVIEW OF SYSTEMS    GI: no vomiting  General: No fevers    PAST MEDICAL & SURGICAL HISTORY    Past Medical History:   Diagnosis Date    Alcohol abuse     Alcohol use disorder, moderate, dependence (HCC)     Cannabis use disorder, mild, abuse     Chronic hepatitis C virus infection ([facility] [address]     Cluster B personality disorder (HCC)     Depression     Diverticulosis     DOMINICK (generalized anxiety disorder)     GERD (gastroesophageal reflux disease)     Kidney stone     PTSD (post-traumatic stress disorder)     Tobacco abuse      Past Surgical History:   Procedure Laterality Date    COLONOSCOPY  2006    HYSTERECTOMY  2008       CURRENT MEDICATIONS   (may include discharge medications prescribed in the ED) Current Outpatient Rx   Medication Sig Dispense Refill    DULoxetine (CYMBALTA) 20 MG extended release capsule Take 3 capsules by mouth daily 90 capsule 0    prazosin (MINIPRESS) 1 MG capsule Take 1 capsule by mouth nightly 30 capsule 0    QUEtiapine (SEROQUEL) 25 MG tablet Take 1 tablet by mouth at bedtime 30 tablet 0    propranolol (INDERAL) 10 MG tablet Take 1 tablet by mouth 3 times daily 90 tablet 0    acetaminophen (TYLENOL) 500 MG tablet Take 1 tablet by mouth 4 times daily as needed for Pain 40 tablet 0    ibuprofen (ADVIL;MOTRIN) 400 MG tablet Take 1 tablet by mouth every 8 hours as needed for Pain 20 tablet 0    albuterol sulfate  (90 Base) MCG/ACT inhaler Use 2 puffs 4 times daily for 7 days then as needed for wheezing. Dispense with Spacer and instruct in use. At patient's preference may use 60 dose MDI. May Sub Pro-Air or Proventil as needed per insurance.  18 g 0    lamoTRIgine (LAMICTAL) 25 MG tablet Take 2 tablets by mouth daily 60 tablet 0    sennosides-docusate sodium (SENOKOT-S) 8.6-50 MG tablet Take 2 tablets by mouth daily 60 tablet 0    amLODIPine (NORVASC) 2.5 MG tablet Take 1 tablet by mouth daily 30 tablet 0    cloNIDine (CATAPRES) 0.1 MG tablet Take 1 tablet by mouth 2 times daily 60 tablet 0       ALLERGIES    Allergies   Allergen Reactions    Bupropion Other (See Comments) and Shortness Of Breath     Changes in mental status  Changes in mental status    Kiwi Extract Anaphylaxis     Food- kiwi    Macrobid [Nitrofurantoin Macrocrystal] Nausea And Vomiting    Morphine And Related Nausea And Vomiting     Other reaction(s): GI Upset    Varenicline Nausea Only       SOCIAL & FAMILY HISTORY    Social History     Socioeconomic History    Marital status:     Number of children: 1    Years of education: 12   Occupational History    Occupation: manager     Employer: [email or web address]   Tobacco Use    Smoking status: Every Day     Packs/day: 1.00     Years: 40.00     Pack years: 40.00 Types: Cigarettes     Start date: 10/15/1981    Smokeless tobacco: Never   Vaping Use    Vaping Use: Never used   Substance and Sexual Activity    Alcohol use: Not Currently     Alcohol/week: 42.0 standard drinks     Types: 42 Cans of beer per week     Comment: 3-4 alcohol per week    Drug use: Yes     Types: Marijuana Joshi Fogo)    Sexual activity: Not Currently     Family History   Problem Relation Age of Onset    Diabetes Mother     High Cholesterol Mother     High Blood Pressure Mother     Diabetes Father     High Cholesterol Father     Cancer Father     Alcohol Abuse Father        PHYSICAL EXAM    VITAL SIGNS: BP (!) 203/107   Pulse 67   Temp 97.3 °F (36.3 °C) (Temporal)   Resp 16   Ht 5' 4\" (1.626 m)   Wt 163 lb 5.8 oz (74.1 kg)   LMP  (LMP Unknown)   SpO2 95%   BMI 28.04 kg/m²   Constitutional:  Well developed, well nourished, no acute distress  HENT:  Atraumatic, no facial swelling  NECK: No neck swelling  Respiratory:  No respiratory distress breath sounds clear throughout auscultation. Cardiovascular:  No JVD regular rhythm and rate, S1-S2. No murmur  Neurologic:  Awake and alert and oriented with normal flow of speech    ED COURSE & MEDICAL DECISION MAKING    See chart for details of medications prescribed. Medications - No data to display    I have seen and evaluated this patient. My attending physician was available for consultation. She is nontoxic in appearance and hemodynamically stable. She is hypertensive with an initial blood pressure 203/107. She is requesting refill of her Cymbalta. She is lost her insurance and she is unable to find a physician currently. She has one dose of Cymbalta left. She did bring her pill bottle and so I can confirm her dosage. After I told her I would refill her prescription we rechecked her blood pressure and it is 154/92. She is going to take the prescription to the [facility] where they will fill it for her today.   She was given a PCP referral for follow-up. She was instructed to always return to the emergency department for worsening symptoms. The patient verbalized understanding of the discharge instructions. FINAL IMPRESSION    1. Encounter for medication refill    2.  Elevated blood pressure reading        PLAN  Discharge with outpatient follow-up (see EMR)       (Please note that this note was completed with a voice recognition program.  Every attempt was made to edit the dictations, but inevitably there remain words that are mis-transcribed.)          Emily Silverio, MITCHELL - CNP  12/16/22 [address] [place] [address] MITCHELL - CNP  12/16/22 0332

## 2022-12-16 NOTE — ED TRIAGE NOTES
Pt arrives ambulatory for refill of duloxetine. Pt sts was getting it filled at [facility] but hey will not see her without insurance. Pt is a/ox4, rsp nonlabored and wpd.

## 2023-05-19 NOTE — BH NOTE
Group Therapy Note    Date: 12/13/2021  Start Time: 20:00  End Time:  21:00  Number of Participants: 12    Type of Group: Recreational  Wrap  up    Jose Angel Bahena Information  Module Name:  /  Session Number:  /    Patient's Goal:  Coping skills    Notes:  Continuing to work on goal    Status After Intervention:  Unchanged    Participation Level:  Active Listener and Interactive    Participation Quality: Appropriate and Attentive      Speech:  normal      Thought Process/Content: Logical      Affective Functioning: Blunted      Mood: anxious      Level of consciousness:  Alert and Attentive      Response to Learning: Able to change behavior      Endings: None Reported    Modes of Intervention: Socialization and Problem-solving      Discipline Responsible: Behavorial Health Tech      Signature:  Stephania Cha No (0)

## 2023-09-17 ENCOUNTER — APPOINTMENT (OUTPATIENT)
Dept: GENERAL RADIOLOGY | Age: 58
DRG: 247 | End: 2023-09-17
Payer: COMMERCIAL

## 2023-09-17 ENCOUNTER — HOSPITAL ENCOUNTER (INPATIENT)
Age: 58
LOS: 1 days | Discharge: LEFT BEFORE DISCHARGE | DRG: 247 | End: 2023-09-18
Attending: STUDENT IN AN ORGANIZED HEALTH CARE EDUCATION/TRAINING PROGRAM | Admitting: INTERNAL MEDICINE
Payer: COMMERCIAL

## 2023-09-17 DIAGNOSIS — R94.31 ABNORMAL EKG: ICD-10-CM

## 2023-09-17 DIAGNOSIS — I15.9 SECONDARY HYPERTENSION: Primary | ICD-10-CM

## 2023-09-17 DIAGNOSIS — R77.8 ELEVATED TROPONIN: ICD-10-CM

## 2023-09-17 DIAGNOSIS — R07.9 CHEST PAIN, UNSPECIFIED TYPE: ICD-10-CM

## 2023-09-17 PROBLEM — I21.4 NSTEMI (NON-ST ELEVATED MYOCARDIAL INFARCTION) (HCC): Status: ACTIVE | Noted: 2023-09-17

## 2023-09-17 LAB
ALBUMIN SERPL-MCNC: 4 G/DL (ref 3.4–5)
ALP SERPL-CCNC: 104 U/L (ref 40–129)
ALT SERPL-CCNC: 26 U/L (ref 10–40)
ANION GAP SERPL CALCULATED.3IONS-SCNC: 12 MMOL/L (ref 3–16)
ANTI-XA UNFRAC HEPARIN: <0.1 IU/ML (ref 0.3–0.7)
AST SERPL-CCNC: 38 U/L (ref 15–37)
BASOPHILS # BLD: 0 K/UL (ref 0–0.2)
BASOPHILS NFR BLD: 0.7 %
BILIRUB DIRECT SERPL-MCNC: <0.2 MG/DL (ref 0–0.3)
BILIRUB INDIRECT SERPL-MCNC: ABNORMAL MG/DL (ref 0–1)
BILIRUB SERPL-MCNC: 0.5 MG/DL (ref 0–1)
BUN SERPL-MCNC: 15 MG/DL (ref 7–20)
CALCIUM SERPL-MCNC: 9.4 MG/DL (ref 8.3–10.6)
CHLORIDE SERPL-SCNC: 104 MMOL/L (ref 99–110)
CO2 SERPL-SCNC: 22 MMOL/L (ref 21–32)
CREAT SERPL-MCNC: 0.7 MG/DL (ref 0.6–1.1)
DEPRECATED RDW RBC AUTO: 12.9 % (ref 12.4–15.4)
EOSINOPHIL # BLD: 0.2 K/UL (ref 0–0.6)
EOSINOPHIL NFR BLD: 2.9 %
GFR SERPLBLD CREATININE-BSD FMLA CKD-EPI: >60 ML/MIN/{1.73_M2}
GLUCOSE BLD-MCNC: 109 MG/DL (ref 70–99)
GLUCOSE SERPL-MCNC: 118 MG/DL (ref 70–99)
HCT VFR BLD AUTO: 42.5 % (ref 36–48)
HGB BLD-MCNC: 14.5 G/DL (ref 12–16)
LEFT VENTRICULAR EJECTION FRACTION HIGH VALUE: 40 %
LEFT VENTRICULAR EJECTION FRACTION MODE: NORMAL
LIPASE SERPL-CCNC: 50 U/L (ref 13–60)
LV EF: 35 %
LYMPHOCYTES # BLD: 2.1 K/UL (ref 1–5.1)
LYMPHOCYTES NFR BLD: 37.1 %
MAGNESIUM SERPL-MCNC: 2.2 MG/DL (ref 1.8–2.4)
MCH RBC QN AUTO: 34.3 PG (ref 26–34)
MCHC RBC AUTO-ENTMCNC: 34.1 G/DL (ref 31–36)
MCV RBC AUTO: 100.4 FL (ref 80–100)
MONOCYTES # BLD: 0.4 K/UL (ref 0–1.3)
MONOCYTES NFR BLD: 7.5 %
NEUTROPHILS # BLD: 3 K/UL (ref 1.7–7.7)
NEUTROPHILS NFR BLD: 51.8 %
NT-PROBNP SERPL-MCNC: 98 PG/ML (ref 0–124)
PERFORMED ON: ABNORMAL
PLATELET # BLD AUTO: 288 K/UL (ref 135–450)
PMV BLD AUTO: 8.9 FL (ref 5–10.5)
POC ACT LR: 209 SEC
POC ACT LR: 344 SEC
POTASSIUM SERPL-SCNC: 5.3 MMOL/L (ref 3.5–5.1)
PROT SERPL-MCNC: 7.3 G/DL (ref 6.4–8.2)
RBC # BLD AUTO: 4.24 M/UL (ref 4–5.2)
SODIUM SERPL-SCNC: 138 MMOL/L (ref 136–145)
TROPONIN, HIGH SENSITIVITY: 12 NG/L (ref 0–14)
TROPONIN, HIGH SENSITIVITY: 87 NG/L (ref 0–14)
WBC # BLD AUTO: 5.7 K/UL (ref 4–11)

## 2023-09-17 PROCEDURE — 2580000003 HC RX 258: Performed by: INTERNAL MEDICINE

## 2023-09-17 PROCEDURE — 99285 EMERGENCY DEPT VISIT HI MDM: CPT

## 2023-09-17 PROCEDURE — 6360000002 HC RX W HCPCS: Performed by: STUDENT IN AN ORGANIZED HEALTH CARE EDUCATION/TRAINING PROGRAM

## 2023-09-17 PROCEDURE — B240ZZ3 ULTRASONOGRAPHY OF SINGLE CORONARY ARTERY, INTRAVASCULAR: ICD-10-PCS | Performed by: INTERNAL MEDICINE

## 2023-09-17 PROCEDURE — 71046 X-RAY EXAM CHEST 2 VIEWS: CPT

## 2023-09-17 PROCEDURE — 92941 PRQ TRLML REVSC TOT OCCL AMI: CPT | Performed by: INTERNAL MEDICINE

## 2023-09-17 PROCEDURE — 6370000000 HC RX 637 (ALT 250 FOR IP): Performed by: STUDENT IN AN ORGANIZED HEALTH CARE EDUCATION/TRAINING PROGRAM

## 2023-09-17 PROCEDURE — 6360000002 HC RX W HCPCS

## 2023-09-17 PROCEDURE — 93005 ELECTROCARDIOGRAM TRACING: CPT | Performed by: STUDENT IN AN ORGANIZED HEALTH CARE EDUCATION/TRAINING PROGRAM

## 2023-09-17 PROCEDURE — 85520 HEPARIN ASSAY: CPT

## 2023-09-17 PROCEDURE — B2111ZZ FLUOROSCOPY OF MULTIPLE CORONARY ARTERIES USING LOW OSMOLAR CONTRAST: ICD-10-PCS | Performed by: INTERNAL MEDICINE

## 2023-09-17 PROCEDURE — 6370000000 HC RX 637 (ALT 250 FOR IP): Performed by: INTERNAL MEDICINE

## 2023-09-17 PROCEDURE — 83690 ASSAY OF LIPASE: CPT

## 2023-09-17 PROCEDURE — 84484 ASSAY OF TROPONIN QUANT: CPT

## 2023-09-17 PROCEDURE — 36415 COLL VENOUS BLD VENIPUNCTURE: CPT

## 2023-09-17 PROCEDURE — 93005 ELECTROCARDIOGRAM TRACING: CPT | Performed by: INTERNAL MEDICINE

## 2023-09-17 PROCEDURE — 99291 CRITICAL CARE FIRST HOUR: CPT | Performed by: INTERNAL MEDICINE

## 2023-09-17 PROCEDURE — C1725 CATH, TRANSLUMIN NON-LASER: HCPCS

## 2023-09-17 PROCEDURE — 4A023N7 MEASUREMENT OF CARDIAC SAMPLING AND PRESSURE, LEFT HEART, PERCUTANEOUS APPROACH: ICD-10-PCS | Performed by: INTERNAL MEDICINE

## 2023-09-17 PROCEDURE — 92978 ENDOLUMINL IVUS OCT C 1ST: CPT

## 2023-09-17 PROCEDURE — 99152 MOD SED SAME PHYS/QHP 5/>YRS: CPT

## 2023-09-17 PROCEDURE — C1753 CATH, INTRAVAS ULTRASOUND: HCPCS

## 2023-09-17 PROCEDURE — 93458 L HRT ARTERY/VENTRICLE ANGIO: CPT

## 2023-09-17 PROCEDURE — 92928 PRQ TCAT PLMT NTRAC ST 1 LES: CPT

## 2023-09-17 PROCEDURE — 92978 ENDOLUMINL IVUS OCT C 1ST: CPT | Performed by: INTERNAL MEDICINE

## 2023-09-17 PROCEDURE — C1894 INTRO/SHEATH, NON-LASER: HCPCS

## 2023-09-17 PROCEDURE — 99152 MOD SED SAME PHYS/QHP 5/>YRS: CPT | Performed by: INTERNAL MEDICINE

## 2023-09-17 PROCEDURE — 85025 COMPLETE CBC W/AUTO DIFF WBC: CPT

## 2023-09-17 PROCEDURE — 1200000000 HC SEMI PRIVATE

## 2023-09-17 PROCEDURE — 6360000004 HC RX CONTRAST MEDICATION: Performed by: INTERNAL MEDICINE

## 2023-09-17 PROCEDURE — G0378 HOSPITAL OBSERVATION PER HR: HCPCS

## 2023-09-17 PROCEDURE — 83880 ASSAY OF NATRIURETIC PEPTIDE: CPT

## 2023-09-17 PROCEDURE — 80076 HEPATIC FUNCTION PANEL: CPT

## 2023-09-17 PROCEDURE — 6370000000 HC RX 637 (ALT 250 FOR IP)

## 2023-09-17 PROCEDURE — C1874 STENT, COATED/COV W/DEL SYS: HCPCS

## 2023-09-17 PROCEDURE — 2709999900 HC NON-CHARGEABLE SUPPLY

## 2023-09-17 PROCEDURE — 85347 COAGULATION TIME ACTIVATED: CPT

## 2023-09-17 PROCEDURE — 6360000002 HC RX W HCPCS: Performed by: INTERNAL MEDICINE

## 2023-09-17 PROCEDURE — 80048 BASIC METABOLIC PNL TOTAL CA: CPT

## 2023-09-17 PROCEDURE — 99153 MOD SED SAME PHYS/QHP EA: CPT

## 2023-09-17 PROCEDURE — B2151ZZ FLUOROSCOPY OF LEFT HEART USING LOW OSMOLAR CONTRAST: ICD-10-PCS | Performed by: INTERNAL MEDICINE

## 2023-09-17 PROCEDURE — 2500000003 HC RX 250 WO HCPCS

## 2023-09-17 PROCEDURE — C1769 GUIDE WIRE: HCPCS

## 2023-09-17 PROCEDURE — 93458 L HRT ARTERY/VENTRICLE ANGIO: CPT | Performed by: INTERNAL MEDICINE

## 2023-09-17 PROCEDURE — 027034Z DILATION OF CORONARY ARTERY, ONE ARTERY WITH DRUG-ELUTING INTRALUMINAL DEVICE, PERCUTANEOUS APPROACH: ICD-10-PCS | Performed by: INTERNAL MEDICINE

## 2023-09-17 PROCEDURE — C1887 CATHETER, GUIDING: HCPCS

## 2023-09-17 PROCEDURE — 83735 ASSAY OF MAGNESIUM: CPT

## 2023-09-17 PROCEDURE — 94761 N-INVAS EAR/PLS OXIMETRY MLT: CPT

## 2023-09-17 RX ORDER — ATORVASTATIN CALCIUM 80 MG/1
80 TABLET, FILM COATED ORAL NIGHTLY
Status: DISCONTINUED | OUTPATIENT
Start: 2023-09-17 | End: 2023-09-18 | Stop reason: HOSPADM

## 2023-09-17 RX ORDER — DEXTROSE MONOHYDRATE 100 MG/ML
INJECTION, SOLUTION INTRAVENOUS CONTINUOUS PRN
Status: DISCONTINUED | OUTPATIENT
Start: 2023-09-17 | End: 2023-09-18 | Stop reason: HOSPADM

## 2023-09-17 RX ORDER — NITROGLYCERIN 0.4 MG/1
0.4 TABLET SUBLINGUAL ONCE
Status: COMPLETED | OUTPATIENT
Start: 2023-09-17 | End: 2023-09-17

## 2023-09-17 RX ORDER — CARVEDILOL 3.12 MG/1
3.12 TABLET ORAL 2 TIMES DAILY WITH MEALS
Status: DISCONTINUED | OUTPATIENT
Start: 2023-09-17 | End: 2023-09-18 | Stop reason: HOSPADM

## 2023-09-17 RX ORDER — AMLODIPINE BESYLATE 5 MG/1
5 TABLET ORAL DAILY
Status: DISCONTINUED | OUTPATIENT
Start: 2023-09-17 | End: 2023-09-17

## 2023-09-17 RX ORDER — NICOTINE 21 MG/24HR
1 PATCH, TRANSDERMAL 24 HOURS TRANSDERMAL DAILY
Status: DISCONTINUED | OUTPATIENT
Start: 2023-09-17 | End: 2023-09-17

## 2023-09-17 RX ORDER — SODIUM CHLORIDE 0.9 % (FLUSH) 0.9 %
5-40 SYRINGE (ML) INJECTION PRN
Status: DISCONTINUED | OUTPATIENT
Start: 2023-09-17 | End: 2023-09-18 | Stop reason: HOSPADM

## 2023-09-17 RX ORDER — SODIUM CHLORIDE 9 MG/ML
INJECTION, SOLUTION INTRAVENOUS PRN
Status: DISCONTINUED | OUTPATIENT
Start: 2023-09-17 | End: 2023-09-18 | Stop reason: HOSPADM

## 2023-09-17 RX ORDER — ASPIRIN 325 MG
325 TABLET ORAL ONCE
Status: COMPLETED | OUTPATIENT
Start: 2023-09-17 | End: 2023-09-17

## 2023-09-17 RX ORDER — ALPRAZOLAM 0.25 MG/1
0.25 TABLET ORAL 2 TIMES DAILY PRN
Status: DISCONTINUED | OUTPATIENT
Start: 2023-09-17 | End: 2023-09-18 | Stop reason: HOSPADM

## 2023-09-17 RX ORDER — HEPARIN SODIUM 1000 [USP'U]/ML
4000 INJECTION, SOLUTION INTRAVENOUS; SUBCUTANEOUS PRN
Status: DISCONTINUED | OUTPATIENT
Start: 2023-09-17 | End: 2023-09-18

## 2023-09-17 RX ORDER — ACETAMINOPHEN 325 MG/1
650 TABLET ORAL EVERY 4 HOURS PRN
Status: DISCONTINUED | OUTPATIENT
Start: 2023-09-17 | End: 2023-09-18 | Stop reason: HOSPADM

## 2023-09-17 RX ORDER — HEPARIN SODIUM 10000 [USP'U]/100ML
5-30 INJECTION, SOLUTION INTRAVENOUS CONTINUOUS
Status: DISCONTINUED | OUTPATIENT
Start: 2023-09-17 | End: 2023-09-17

## 2023-09-17 RX ORDER — HEPARIN SODIUM 1000 [USP'U]/ML
4000 INJECTION, SOLUTION INTRAVENOUS; SUBCUTANEOUS ONCE
Status: COMPLETED | OUTPATIENT
Start: 2023-09-17 | End: 2023-09-17

## 2023-09-17 RX ORDER — ONDANSETRON 4 MG/1
4 TABLET, ORALLY DISINTEGRATING ORAL EVERY 8 HOURS PRN
Status: DISCONTINUED | OUTPATIENT
Start: 2023-09-17 | End: 2023-09-18 | Stop reason: HOSPADM

## 2023-09-17 RX ORDER — ONDANSETRON 2 MG/ML
4 INJECTION INTRAMUSCULAR; INTRAVENOUS EVERY 6 HOURS PRN
Status: DISCONTINUED | OUTPATIENT
Start: 2023-09-17 | End: 2023-09-18 | Stop reason: HOSPADM

## 2023-09-17 RX ORDER — ASPIRIN 81 MG/1
81 TABLET, CHEWABLE ORAL DAILY
Status: DISCONTINUED | OUTPATIENT
Start: 2023-09-18 | End: 2023-09-18 | Stop reason: HOSPADM

## 2023-09-17 RX ORDER — POLYETHYLENE GLYCOL 3350 17 G/17G
17 POWDER, FOR SOLUTION ORAL DAILY PRN
Status: DISCONTINUED | OUTPATIENT
Start: 2023-09-17 | End: 2023-09-18 | Stop reason: HOSPADM

## 2023-09-17 RX ORDER — HEPARIN SODIUM 1000 [USP'U]/ML
2000 INJECTION, SOLUTION INTRAVENOUS; SUBCUTANEOUS PRN
Status: DISCONTINUED | OUTPATIENT
Start: 2023-09-17 | End: 2023-09-18

## 2023-09-17 RX ORDER — SODIUM CHLORIDE 0.9 % (FLUSH) 0.9 %
5-40 SYRINGE (ML) INJECTION EVERY 12 HOURS SCHEDULED
Status: DISCONTINUED | OUTPATIENT
Start: 2023-09-17 | End: 2023-09-18 | Stop reason: HOSPADM

## 2023-09-17 RX ORDER — QUETIAPINE FUMARATE 25 MG/1
25 TABLET, FILM COATED ORAL NIGHTLY
Status: DISCONTINUED | OUTPATIENT
Start: 2023-09-17 | End: 2023-09-17

## 2023-09-17 RX ORDER — NICOTINE 21 MG/24HR
1 PATCH, TRANSDERMAL 24 HOURS TRANSDERMAL
Status: DISCONTINUED | OUTPATIENT
Start: 2023-09-18 | End: 2023-09-18 | Stop reason: HOSPADM

## 2023-09-17 RX ORDER — EPTIFIBATIDE 0.75 MG/ML
2 INJECTION, SOLUTION INTRAVENOUS CONTINUOUS
Status: DISPENSED | OUTPATIENT
Start: 2023-09-17 | End: 2023-09-17

## 2023-09-17 RX ORDER — LISINOPRIL 5 MG/1
2.5 TABLET ORAL DAILY
Status: DISCONTINUED | OUTPATIENT
Start: 2023-09-17 | End: 2023-09-18 | Stop reason: HOSPADM

## 2023-09-17 RX ORDER — DULOXETIN HYDROCHLORIDE 30 MG/1
60 CAPSULE, DELAYED RELEASE ORAL DAILY
Status: DISCONTINUED | OUTPATIENT
Start: 2023-09-17 | End: 2023-09-18 | Stop reason: HOSPADM

## 2023-09-17 RX ADMIN — IOPAMIDOL 144 ML: 755 INJECTION, SOLUTION INTRAVENOUS at 11:02

## 2023-09-17 RX ADMIN — EPTIFIBATIDE 2 MCG/KG/MIN: 0.75 INJECTION INTRAVENOUS at 18:00

## 2023-09-17 RX ADMIN — ACETAMINOPHEN 650 MG: 325 TABLET ORAL at 21:52

## 2023-09-17 RX ADMIN — AMLODIPINE BESYLATE 5 MG: 5 TABLET ORAL at 07:25

## 2023-09-17 RX ADMIN — HEPARIN SODIUM 12 UNITS/KG/HR: 10000 INJECTION, SOLUTION INTRAVENOUS at 08:50

## 2023-09-17 RX ADMIN — LISINOPRIL 2.5 MG: 5 TABLET ORAL at 13:15

## 2023-09-17 RX ADMIN — HEPARIN SODIUM 4000 UNITS: 1000 INJECTION INTRAVENOUS; SUBCUTANEOUS at 08:50

## 2023-09-17 RX ADMIN — ASPIRIN 325 MG: 325 TABLET ORAL at 09:27

## 2023-09-17 RX ADMIN — TICAGRELOR 90 MG: 90 TABLET ORAL at 20:41

## 2023-09-17 RX ADMIN — ALPRAZOLAM 0.25 MG: 0.25 TABLET ORAL at 16:06

## 2023-09-17 RX ADMIN — CARVEDILOL 3.12 MG: 3.12 TABLET, FILM COATED ORAL at 20:41

## 2023-09-17 RX ADMIN — NITROGLYCERIN 0.4 MG: 0.4 TABLET, ORALLY DISINTEGRATING SUBLINGUAL at 08:48

## 2023-09-17 RX ADMIN — Medication 10 ML: at 20:41

## 2023-09-17 RX ADMIN — Medication 10 ML: at 13:23

## 2023-09-17 RX ADMIN — EPTIFIBATIDE 2 MCG/KG/MIN: 0.75 INJECTION INTRAVENOUS at 12:27

## 2023-09-17 RX ADMIN — ATORVASTATIN CALCIUM 80 MG: 80 TABLET, FILM COATED ORAL at 20:41

## 2023-09-17 ASSESSMENT — PAIN SCALES - GENERAL
PAINLEVEL_OUTOF10: 5
PAINLEVEL_OUTOF10: 0
PAINLEVEL_OUTOF10: 10
PAINLEVEL_OUTOF10: 0

## 2023-09-17 ASSESSMENT — PAIN DESCRIPTION - LOCATION
LOCATION: CHEST
LOCATION: HEAD

## 2023-09-17 ASSESSMENT — PAIN - FUNCTIONAL ASSESSMENT: PAIN_FUNCTIONAL_ASSESSMENT: 0-10

## 2023-09-17 ASSESSMENT — PAIN DESCRIPTION - DESCRIPTORS: DESCRIPTORS: TIGHTNESS

## 2023-09-17 NOTE — PRE SEDATION
Sedation Pre-Procedure Note    Patient Name: Christen Clark   YOB: 1965  Room/Bed: ARNOLDO/NONE  Medical Record Number: [id number]  Date: 9/17/2023   Time: 11:07 AM       Indication:  NSTEMI    Consent: I have discussed with the patient and/or the patient representative the indication, alternatives, and the possible risks and/or complications of the planned procedure and the anesthesia methods. The patient and/or patient representative appear to understand and agree to proceed. Vital Signs:   Vitals:    09/17/23 0852   BP: (!) 127/97   Pulse: 65   Resp: 21   Temp:    SpO2: 97%       Past Medical History:   has a past medical history of Alcohol abuse, Alcohol use disorder, moderate, dependence ([address]), Cannabis use disorder, mild, abuse, Chronic hepatitis C virus infection ([address]), Cluster B personality disorder ([address]), Depression, Diverticulosis, DOMINICK (generalized anxiety disorder), GERD (gastroesophageal reflux disease), Kidney stone, PTSD (post-traumatic stress disorder), and Tobacco abuse. Past Surgical History:   has a past surgical history that includes Colonoscopy (2006) and Hysterectomy (2008). Medications:   Scheduled Meds:    aluminum & magnesium hydroxide-simethicone (MAALOX) 30 mL, lidocaine viscous hcl (XYLOCAINE) 5 mL (GI COCKTAIL)   Oral Once     Continuous Infusions:    heparin (PORCINE) Infusion 12 Units/kg/hr (09/17/23 0850)     PRN Meds: heparin (porcine), heparin (porcine)  Home Meds:   Prior to Admission medications    Medication Sig Start Date End Date Taking?  Authorizing Provider   DULoxetine (CYMBALTA) 20 MG extended release capsule Take 3 capsules by mouth daily 12/16/22 1/15/23  MITCHELL Thomas - CNP   prazosin (MINIPRESS) 1 MG capsule Take 1 capsule by mouth nightly 3/24/22   Elizabeth Reardon MD   QUEtiapine (SEROQUEL) 25 MG tablet Take 1 tablet by mouth at bedtime 3/24/22   Elizabeth Reardon MD   propranolol (INDERAL) 10 MG tablet Take 1 tablet by mouth 3

## 2023-09-17 NOTE — H&P
V2.0  History and Physical      Name:  Deepti Sanchez /Age/Sex: 1965  (65 y.o. female)   MRN & CSN:  [id number] & [id number] Encounter Date/Time: 2023 12:11 PM EDT   Location:  [facility]15/2915[facility]01 PCP: MITCHELL Foster NP       Hospital Day: 1    Assessment and Plan:   Deepti Sanchez is a 65 y.o. female with a pmh of HTN, alcohol abuse, tobacco dependence, cocaine abuse, DOMINICK, bipolar disorder, recurrent MDD with prior suicide attempts, brought in by EMS from work due to severe chest pain and admitted with NSTEMI (non-ST elevated myocardial infarction) ([address] Hospital Problems             Last Modified POA    * (Principal) NSTEMI (non-ST elevated myocardial infarction) ([address]) 2023 Yes       Plan:    s/p LHC  with successful ADRIANA x 1 to the mid LAD. Continue BB, high potency statin, aspirin and brilinta for 12 months  Follow up Lipid panel and A1c. Ischemic cardiomyopathy:  Left ventricular angiogram revealed severe LV dysfunction with an estimated EF of 30%. Follow up ECHO. Current Smoker: Nicotine patch ordered. Smoking cessation counseling. Macrocytosis without Anemia: follow up B12 and folate. Bipolar/MDD/DOMINICK, continue home medications. HTN: Monitor BP on Coreg and lisinopril. Cocaine abuse: Follow-up urine tox screen. Disposition:   Current Living situation: Home  Expected Disposition: Home  Estimated D/C:     Diet ADULT DIET; Regular; Low Fat/Low Chol/High Fiber/2 gm Na   DVT Prophylaxis [x] Lovenox, []  Heparin, [] SCDs, [] Ambulation,  [] Eliquis, [] Xarelto, [] Coumadin   Code Status Full Code   Surrogate Decision Maker/ POA Lalo Paulson   [phone]     Personally reviewed Lab Studies and Imaging     EKG interpreted personally and results: NSR with anterior ST-T wave abnormality.     Imaging that was interpreted personally includes CXR and results was unremarkable      History from:     patient, Chart    History of Present Illness:     Chief

## 2023-09-17 NOTE — PROGRESS NOTES
PATIENT HISTORY    ECHO: DATE:        EF:     STRESS TEST PREFORMED:  No  EKG: Yes    ECG     Result: Normal  Pre CATH Rhythm: Normal Sinus Rhythm  HYPERTENSION: No  DYSLIPIDEMIA: No  FAMILY HX OF CAD: No  PRIOR MI: No  PRIOR PCI: No  PRIOR CABG: No  CEREBROVASCULAR DX: No  PERIPHERAL ARTERIAL DISEASE: No  CHRONIC LUNG DISEASE: No  TOBACCO: Cigarettes >10 a Day  DIABETIC: No  CARDIAC ARREST: {No  DIALYSIS: No  HEART FAILURE: No  FRAILTY SCORE: 3 MANAGING WELL (medical problems are well controlled, not regularly active beyond routine walking)  CARDIAC CTA PREFORMED:  No  [place] CORONARY CALCIUM SCORE:   Assessed: No  Prior Diagnostic Coronary Angioplasty Procedure:   No

## 2023-09-18 ENCOUNTER — TELEPHONE (OUTPATIENT)
Dept: CARDIOLOGY CLINIC | Age: 58
End: 2023-09-18

## 2023-09-18 VITALS
HEIGHT: 64 IN | SYSTOLIC BLOOD PRESSURE: 120 MMHG | TEMPERATURE: 97 F | OXYGEN SATURATION: 98 % | HEART RATE: 71 BPM | WEIGHT: 153.44 LBS | DIASTOLIC BLOOD PRESSURE: 95 MMHG | RESPIRATION RATE: 19 BRPM | BODY MASS INDEX: 26.2 KG/M2

## 2023-09-18 PROBLEM — I51.9 LV DYSFUNCTION: Status: ACTIVE | Noted: 2023-09-18

## 2023-09-18 PROBLEM — I25.5 ISCHEMIC CARDIOMYOPATHY: Status: ACTIVE | Noted: 2023-09-18

## 2023-09-18 PROBLEM — F41.8 ANXIETY WITH DEPRESSION: Status: ACTIVE | Noted: 2022-03-21

## 2023-09-18 PROBLEM — F31.9 BIPOLAR 1 DISORDER (HCC): Status: ACTIVE | Noted: 2023-09-18

## 2023-09-18 PROBLEM — D75.89 MACROCYTOSIS: Status: ACTIVE | Noted: 2023-09-18

## 2023-09-18 PROBLEM — F19.10 POLYSUBSTANCE ABUSE (HCC): Status: ACTIVE | Noted: 2023-09-18

## 2023-09-18 LAB
AMPHETAMINES UR QL SCN>1000 NG/ML: ABNORMAL
ANION GAP SERPL CALCULATED.3IONS-SCNC: 11 MMOL/L (ref 3–16)
BARBITURATES UR QL SCN>200 NG/ML: ABNORMAL
BENZODIAZ UR QL SCN>200 NG/ML: POSITIVE
BUN SERPL-MCNC: 15 MG/DL (ref 7–20)
CALCIUM SERPL-MCNC: 9.7 MG/DL (ref 8.3–10.6)
CANNABINOIDS UR QL SCN>50 NG/ML: POSITIVE
CHLORIDE SERPL-SCNC: 106 MMOL/L (ref 99–110)
CO2 SERPL-SCNC: 23 MMOL/L (ref 21–32)
COCAINE UR QL SCN: ABNORMAL
CREAT SERPL-MCNC: <0.5 MG/DL (ref 0.6–1.1)
DEPRECATED RDW RBC AUTO: 12.9 % (ref 12.4–15.4)
DRUG SCREEN COMMENT UR-IMP: ABNORMAL
EKG ATRIAL RATE: 58 BPM
EKG ATRIAL RATE: 59 BPM
EKG ATRIAL RATE: 62 BPM
EKG ATRIAL RATE: 76 BPM
EKG DIAGNOSIS: NORMAL
EKG P AXIS: 33 DEGREES
EKG P AXIS: 39 DEGREES
EKG P AXIS: 40 DEGREES
EKG P AXIS: 41 DEGREES
EKG P-R INTERVAL: 134 MS
EKG P-R INTERVAL: 140 MS
EKG Q-T INTERVAL: 416 MS
EKG Q-T INTERVAL: 432 MS
EKG Q-T INTERVAL: 456 MS
EKG Q-T INTERVAL: 466 MS
EKG QRS DURATION: 78 MS
EKG QRS DURATION: 78 MS
EKG QRS DURATION: 82 MS
EKG QRS DURATION: 82 MS
EKG QTC CALCULATION (BAZETT): 438 MS
EKG QTC CALCULATION (BAZETT): 451 MS
EKG QTC CALCULATION (BAZETT): 457 MS
EKG QTC CALCULATION (BAZETT): 468 MS
EKG R AXIS: 49 DEGREES
EKG R AXIS: 52 DEGREES
EKG R AXIS: 55 DEGREES
EKG R AXIS: 58 DEGREES
EKG T AXIS: 46 DEGREES
EKG T AXIS: 68 DEGREES
EKG T AXIS: 69 DEGREES
EKG T AXIS: 75 DEGREES
EKG VENTRICULAR RATE: 58 BPM
EKG VENTRICULAR RATE: 59 BPM
EKG VENTRICULAR RATE: 62 BPM
EKG VENTRICULAR RATE: 76 BPM
EST. AVERAGE GLUCOSE BLD GHB EST-MCNC: 111.2 MG/DL
FENTANYL SCREEN, URINE: POSITIVE
FOLATE SERPL-MCNC: 9.96 NG/ML (ref 4.78–24.2)
GFR SERPLBLD CREATININE-BSD FMLA CKD-EPI: >60 ML/MIN/{1.73_M2}
GLUCOSE SERPL-MCNC: 123 MG/DL (ref 70–99)
HBA1C MFR BLD: 5.5 %
HCT VFR BLD AUTO: 43.5 % (ref 36–48)
HGB BLD-MCNC: 14.7 G/DL (ref 12–16)
MCH RBC QN AUTO: 33.9 PG (ref 26–34)
MCHC RBC AUTO-ENTMCNC: 33.7 G/DL (ref 31–36)
MCV RBC AUTO: 100.3 FL (ref 80–100)
METHADONE UR QL SCN>300 NG/ML: ABNORMAL
OPIATES UR QL SCN>300 NG/ML: ABNORMAL
OXYCODONE UR QL SCN: ABNORMAL
PCP UR QL SCN>25 NG/ML: ABNORMAL
PH UR STRIP: 6.5 [PH]
PLATELET # BLD AUTO: 282 K/UL (ref 135–450)
PMV BLD AUTO: 9 FL (ref 5–10.5)
POTASSIUM SERPL-SCNC: 3.8 MMOL/L (ref 3.5–5.1)
RBC # BLD AUTO: 4.34 M/UL (ref 4–5.2)
SODIUM SERPL-SCNC: 140 MMOL/L (ref 136–145)
VIT B12 SERPL-MCNC: 315 PG/ML (ref 211–911)
WBC # BLD AUTO: 7.9 K/UL (ref 4–11)

## 2023-09-18 PROCEDURE — 80061 LIPID PANEL: CPT

## 2023-09-18 PROCEDURE — 83036 HEMOGLOBIN GLYCOSYLATED A1C: CPT

## 2023-09-18 PROCEDURE — 6370000000 HC RX 637 (ALT 250 FOR IP): Performed by: INTERNAL MEDICINE

## 2023-09-18 PROCEDURE — 82746 ASSAY OF FOLIC ACID SERUM: CPT

## 2023-09-18 PROCEDURE — 93010 ELECTROCARDIOGRAM REPORT: CPT | Performed by: INTERNAL MEDICINE

## 2023-09-18 PROCEDURE — 2580000003 HC RX 258: Performed by: INTERNAL MEDICINE

## 2023-09-18 PROCEDURE — 80307 DRUG TEST PRSMV CHEM ANLYZR: CPT

## 2023-09-18 PROCEDURE — 85027 COMPLETE CBC AUTOMATED: CPT

## 2023-09-18 PROCEDURE — 99232 SBSQ HOSP IP/OBS MODERATE 35: CPT | Performed by: INTERNAL MEDICINE

## 2023-09-18 PROCEDURE — 93306 TTE W/DOPPLER COMPLETE: CPT

## 2023-09-18 PROCEDURE — 80048 BASIC METABOLIC PNL TOTAL CA: CPT

## 2023-09-18 PROCEDURE — 82607 VITAMIN B-12: CPT

## 2023-09-18 RX ORDER — DULOXETIN HYDROCHLORIDE 20 MG/1
60 CAPSULE, DELAYED RELEASE ORAL DAILY
Qty: 90 CAPSULE | Refills: 0 | Status: SHIPPED | OUTPATIENT
Start: 2023-09-18 | End: 2023-10-18

## 2023-09-18 RX ORDER — ATORVASTATIN CALCIUM 80 MG/1
80 TABLET, FILM COATED ORAL NIGHTLY
Qty: 30 TABLET | Refills: 3 | Status: SHIPPED | OUTPATIENT
Start: 2023-09-18

## 2023-09-18 RX ORDER — ASPIRIN 81 MG/1
81 TABLET, CHEWABLE ORAL DAILY
Qty: 30 TABLET | Refills: 3 | Status: SHIPPED | OUTPATIENT
Start: 2023-09-19

## 2023-09-18 RX ORDER — CARVEDILOL 3.12 MG/1
3.12 TABLET ORAL 2 TIMES DAILY WITH MEALS
Qty: 60 TABLET | Refills: 3 | Status: SHIPPED | OUTPATIENT
Start: 2023-09-18

## 2023-09-18 RX ORDER — LISINOPRIL 2.5 MG/1
2.5 TABLET ORAL DAILY
Qty: 30 TABLET | Refills: 3 | Status: SHIPPED | OUTPATIENT
Start: 2023-09-19

## 2023-09-18 RX ADMIN — ASPIRIN 81 MG: 81 TABLET, CHEWABLE ORAL at 08:19

## 2023-09-18 RX ADMIN — TICAGRELOR 90 MG: 90 TABLET ORAL at 08:19

## 2023-09-18 RX ADMIN — Medication 10 ML: at 10:00

## 2023-09-18 RX ADMIN — LISINOPRIL 2.5 MG: 5 TABLET ORAL at 08:19

## 2023-09-18 RX ADMIN — DULOXETINE HYDROCHLORIDE 60 MG: 30 CAPSULE, DELAYED RELEASE ORAL at 08:19

## 2023-09-18 RX ADMIN — ACETAMINOPHEN 650 MG: 325 TABLET ORAL at 08:18

## 2023-09-18 RX ADMIN — ALPRAZOLAM 0.25 MG: 0.25 TABLET ORAL at 04:12

## 2023-09-18 RX ADMIN — CARVEDILOL 3.12 MG: 3.12 TABLET, FILM COATED ORAL at 08:19

## 2023-09-18 NOTE — DISCHARGE SUMMARY
discharge medications or discharge instructions/follow-up plan. Patient signed AMA form understanding all the risk involved in leaving AMA. She was alert and oriented x4 and per RN she cited personal obligations and wanting to rest at home and not here as reasons. I was able to call the patient to inform her that her medications have been sent to the pharmacy on file and she needs to be on DAPT for 12 months. I have asked the cardiology nurse to call her with a follow up appointment as well. The patient expressed appropriate understanding of, and agreement with the discharge recommendations, medications, and plan.      Consults this admission:  IP CONSULT TO CARDIOLOGY  IP CONSULT TO INTERNAL MEDICINE  IP CONSULT TO CARDIAC REHAB  IP CONSULT TO CARDIAC REHAB    Discharge Diagnosis:   NSTEMI (non-ST elevated myocardial infarction) [facility])      Discharge Instruction:   Follow up appointments: Cardiology  Primary care physician: MITCHELL Ramey NP within 1 weeks  Diet: cardiac diet   Activity: activity as tolerated  Disposition: Discharged to:   [x]Home AMA, []HHC, []SNF, []Acute Rehab, []Hospice   Condition on discharge: Stable  Labs and Tests to be Followed up as an outpatient by PCP or Specialist: ECHO    Discharge Medications:        Medication List        START taking these medications      aspirin 81 MG chewable tablet  Take 1 tablet by mouth daily  Start taking on: September 19, 2023     atorvastatin 80 MG tablet  Commonly known as: LIPITOR  Take 1 tablet by mouth nightly     carvedilol 3.125 MG tablet  Commonly known as: COREG  Take 1 tablet by mouth 2 times daily (with meals)     lisinopril 2.5 MG tablet  Commonly known as: PRINIVIL;ZESTRIL  Take 1 tablet by mouth daily  Start taking on: September 19, 2023     ticagrelor 90 MG Tabs tablet  Commonly known as: BRILINTA  Take 1 tablet by mouth 2 times daily            CONTINUE taking these medications      DULoxetine 20 MG extended release

## 2023-09-18 NOTE — CARE COORDINATION
Discharge Planning:     (CM) reviewed the patient's chart to assess needs. Patient's Readmission Risk Score is 5%. Patient's medical insurance is [facility]. Patient's PCP is Aroldo Antoine. No needs anticipated, at this time. CM team to follow. Staff to inform CM if additional discharge needs arise.      Electronically signed by Sanjeev Saab on 9/18/2023 at 9:24 AM

## 2023-09-18 NOTE — PATIENT CHOICE
Followed up with Talon Umana on 9/18/2023 at 10:51 AM. Patient left CVICU room [phone] with a disposition of AMA on . Patient cited personal obligations and wanting to rest at home and not here as reasons. Offered physicians to come talk to her and she refused to talk with anyone else and wanted to leave immediately. Advised patient to follow up with a primary care physician or return to the Emergency Department if having further symptoms or in case of emergency. IV's removed. Pt collected all of her belongings and ambulated herself to the lobby.    Stu Phillips RN

## 2023-09-18 NOTE — TELEPHONE ENCOUNTER
Please call patient and let her know she has been scheduled for PCI fu. If she cannot make this appointment please reschedule.  Pt left AMA earlier today before appointment could be made

## 2023-09-19 ENCOUNTER — TELEPHONE (OUTPATIENT)
Dept: CARDIOLOGY CLINIC | Age: 58
End: 2023-09-19

## 2023-09-19 LAB
CHOLEST SERPL-MCNC: 226 MG/DL (ref 0–199)
HDLC SERPL-MCNC: 68 MG/DL (ref 40–60)
LDLC SERPL CALC-MCNC: 130 MG/DL
TRIGL SERPL-MCNC: 141 MG/DL (ref 0–150)
VLDLC SERPL CALC-MCNC: 28 MG/DL

## 2023-09-19 NOTE — TELEPHONE ENCOUNTER
Dawna from [employer] calling stating that brillinta isnt covered by her insurance so was wondering if they could switch to something else   Please advise   Thank you

## 2023-09-20 DIAGNOSIS — I25.83 CORONARY ARTERY DISEASE DUE TO LIPID RICH PLAQUE: Primary | ICD-10-CM

## 2023-09-20 DIAGNOSIS — I25.10 CORONARY ARTERY DISEASE DUE TO LIPID RICH PLAQUE: Primary | ICD-10-CM

## 2023-09-20 RX ORDER — CLOPIDOGREL BISULFATE 75 MG/1
75 TABLET ORAL DAILY
Qty: 90 TABLET | Refills: 3 | Status: SHIPPED | OUTPATIENT
Start: 2023-09-20

## 2023-09-20 NOTE — TELEPHONE ENCOUNTER
Per [facility] switch to plavix. Called and spoke to [place] pt sister about change. V/u.  Prescription sent

## 2023-10-06 NOTE — FLOWSHEET NOTE
Pt asleep. Seroquel effective. Stelara Counseling:  I discussed with the patient the risks of ustekinumab including but not limited to immunosuppression, malignancy, posterior leukoencephalopathy syndrome, and serious infections.  The patient understands that monitoring is required including a PPD at baseline and must alert us or the primary physician if symptoms of infection or other concerning signs are noted.

## 2023-10-13 ENCOUNTER — APPOINTMENT (OUTPATIENT)
Dept: GENERAL RADIOLOGY | Age: 58
DRG: 287 | End: 2023-10-13
Payer: COMMERCIAL

## 2023-10-13 ENCOUNTER — HOSPITAL ENCOUNTER (INPATIENT)
Age: 58
LOS: 3 days | Discharge: HOME | DRG: 287 | End: 2023-10-16
Attending: EMERGENCY MEDICINE | Admitting: INTERNAL MEDICINE
Payer: COMMERCIAL

## 2023-10-13 DIAGNOSIS — R45.851 SUICIDAL IDEATION: ICD-10-CM

## 2023-10-13 DIAGNOSIS — R07.9 CHEST PAIN, UNSPECIFIED TYPE: Primary | ICD-10-CM

## 2023-10-13 PROBLEM — I25.10 CAD (CORONARY ARTERY DISEASE): Status: ACTIVE | Noted: 2023-10-13

## 2023-10-13 LAB
ALBUMIN SERPL-MCNC: 4.6 G/DL (ref 3.4–5)
ALBUMIN/GLOB SERPL: 1.4 {RATIO} (ref 1.1–2.2)
ALP SERPL-CCNC: 154 U/L (ref 40–129)
ALT SERPL-CCNC: 21 U/L (ref 10–40)
AMPHETAMINES UR QL SCN>1000 NG/ML: ABNORMAL
ANION GAP SERPL CALCULATED.3IONS-SCNC: 12 MMOL/L (ref 3–16)
APAP SERPL-MCNC: <5 UG/ML (ref 10–30)
AST SERPL-CCNC: 22 U/L (ref 15–37)
BARBITURATES UR QL SCN>200 NG/ML: ABNORMAL
BASOPHILS # BLD: 0.1 K/UL (ref 0–0.2)
BASOPHILS NFR BLD: 0.9 %
BENZODIAZ UR QL SCN>200 NG/ML: ABNORMAL
BILIRUB SERPL-MCNC: 0.4 MG/DL (ref 0–1)
BUN SERPL-MCNC: 10 MG/DL (ref 7–20)
CALCIUM SERPL-MCNC: 9.4 MG/DL (ref 8.3–10.6)
CANNABINOIDS UR QL SCN>50 NG/ML: POSITIVE
CHLORIDE SERPL-SCNC: 102 MMOL/L (ref 99–110)
CO2 SERPL-SCNC: 24 MMOL/L (ref 21–32)
COCAINE UR QL SCN: ABNORMAL
CREAT SERPL-MCNC: 0.6 MG/DL (ref 0.6–1.1)
DEPRECATED RDW RBC AUTO: 13 % (ref 12.4–15.4)
DRUG SCREEN COMMENT UR-IMP: ABNORMAL
EKG ATRIAL RATE: 68 BPM
EKG DIAGNOSIS: NORMAL
EKG P AXIS: 30 DEGREES
EKG P-R INTERVAL: 134 MS
EKG Q-T INTERVAL: 436 MS
EKG QRS DURATION: 82 MS
EKG QTC CALCULATION (BAZETT): 463 MS
EKG R AXIS: 66 DEGREES
EKG T AXIS: 116 DEGREES
EKG VENTRICULAR RATE: 68 BPM
EOSINOPHIL # BLD: 0.4 K/UL (ref 0–0.6)
EOSINOPHIL NFR BLD: 7.5 %
ETHANOLAMINE SERPL-MCNC: 35 MG/DL (ref 0–0.08)
FENTANYL SCREEN, URINE: ABNORMAL
FLUAV RNA UPPER RESP QL NAA+PROBE: NEGATIVE
FLUBV AG NPH QL: NEGATIVE
GFR SERPLBLD CREATININE-BSD FMLA CKD-EPI: >60 ML/MIN/{1.73_M2}
GLUCOSE SERPL-MCNC: 96 MG/DL (ref 70–99)
HCG SERPL QL: NEGATIVE
HCT VFR BLD AUTO: 42 % (ref 36–48)
HGB BLD-MCNC: 14 G/DL (ref 12–16)
LYMPHOCYTES # BLD: 1.9 K/UL (ref 1–5.1)
LYMPHOCYTES NFR BLD: 32.4 %
MCH RBC QN AUTO: 33.5 PG (ref 26–34)
MCHC RBC AUTO-ENTMCNC: 33.4 G/DL (ref 31–36)
MCV RBC AUTO: 100.3 FL (ref 80–100)
METHADONE UR QL SCN>300 NG/ML: ABNORMAL
MONOCYTES # BLD: 0.4 K/UL (ref 0–1.3)
MONOCYTES NFR BLD: 7.4 %
NEUTROPHILS # BLD: 3 K/UL (ref 1.7–7.7)
NEUTROPHILS NFR BLD: 51.8 %
OPIATES UR QL SCN>300 NG/ML: ABNORMAL
OXYCODONE UR QL SCN: ABNORMAL
PCP UR QL SCN>25 NG/ML: ABNORMAL
PH UR STRIP: 5 [PH]
PLATELET # BLD AUTO: 386 K/UL (ref 135–450)
PMV BLD AUTO: 7.8 FL (ref 5–10.5)
POTASSIUM SERPL-SCNC: 4.2 MMOL/L (ref 3.5–5.1)
PROT SERPL-MCNC: 7.8 G/DL (ref 6.4–8.2)
RBC # BLD AUTO: 4.18 M/UL (ref 4–5.2)
SALICYLATES SERPL-MCNC: 1.2 MG/DL (ref 15–30)
SARS-COV-2 RDRP RESP QL NAA+PROBE: NOT DETECTED
SODIUM SERPL-SCNC: 138 MMOL/L (ref 136–145)
TROPONIN, HIGH SENSITIVITY: 7 NG/L (ref 0–14)
TROPONIN, HIGH SENSITIVITY: 9 NG/L (ref 0–14)
TROPONIN, HIGH SENSITIVITY: <6 NG/L (ref 0–14)
WBC # BLD AUTO: 5.9 K/UL (ref 4–11)

## 2023-10-13 PROCEDURE — 80307 DRUG TEST PRSMV CHEM ANLYZR: CPT

## 2023-10-13 PROCEDURE — 99223 1ST HOSP IP/OBS HIGH 75: CPT | Performed by: STUDENT IN AN ORGANIZED HEALTH CARE EDUCATION/TRAINING PROGRAM

## 2023-10-13 PROCEDURE — 85025 COMPLETE CBC W/AUTO DIFF WBC: CPT

## 2023-10-13 PROCEDURE — B2111ZZ FLUOROSCOPY OF MULTIPLE CORONARY ARTERIES USING LOW OSMOLAR CONTRAST: ICD-10-PCS | Performed by: INTERNAL MEDICINE

## 2023-10-13 PROCEDURE — 2580000003 HC RX 258: Performed by: PHYSICIAN ASSISTANT

## 2023-10-13 PROCEDURE — 99285 EMERGENCY DEPT VISIT HI MDM: CPT

## 2023-10-13 PROCEDURE — 36415 COLL VENOUS BLD VENIPUNCTURE: CPT

## 2023-10-13 PROCEDURE — 2060000000 HC ICU INTERMEDIATE R&B

## 2023-10-13 PROCEDURE — 2580000003 HC RX 258: Performed by: INTERNAL MEDICINE

## 2023-10-13 PROCEDURE — 87635 SARS-COV-2 COVID-19 AMP PRB: CPT

## 2023-10-13 PROCEDURE — 6370000000 HC RX 637 (ALT 250 FOR IP): Performed by: INTERNAL MEDICINE

## 2023-10-13 PROCEDURE — 84703 CHORIONIC GONADOTROPIN ASSAY: CPT

## 2023-10-13 PROCEDURE — 82077 ASSAY SPEC XCP UR&BREATH IA: CPT

## 2023-10-13 PROCEDURE — 93010 ELECTROCARDIOGRAM REPORT: CPT | Performed by: INTERNAL MEDICINE

## 2023-10-13 PROCEDURE — 6370000000 HC RX 637 (ALT 250 FOR IP): Performed by: EMERGENCY MEDICINE

## 2023-10-13 PROCEDURE — 80179 DRUG ASSAY SALICYLATE: CPT

## 2023-10-13 PROCEDURE — 84484 ASSAY OF TROPONIN QUANT: CPT

## 2023-10-13 PROCEDURE — 1200000000 HC SEMI PRIVATE

## 2023-10-13 PROCEDURE — 6360000002 HC RX W HCPCS: Performed by: INTERNAL MEDICINE

## 2023-10-13 PROCEDURE — 87804 INFLUENZA ASSAY W/OPTIC: CPT

## 2023-10-13 PROCEDURE — 4A023N7 MEASUREMENT OF CARDIAC SAMPLING AND PRESSURE, LEFT HEART, PERCUTANEOUS APPROACH: ICD-10-PCS | Performed by: INTERNAL MEDICINE

## 2023-10-13 PROCEDURE — 71045 X-RAY EXAM CHEST 1 VIEW: CPT

## 2023-10-13 PROCEDURE — 80143 DRUG ASSAY ACETAMINOPHEN: CPT

## 2023-10-13 PROCEDURE — B2151ZZ FLUOROSCOPY OF LEFT HEART USING LOW OSMOLAR CONTRAST: ICD-10-PCS | Performed by: INTERNAL MEDICINE

## 2023-10-13 PROCEDURE — 6370000000 HC RX 637 (ALT 250 FOR IP): Performed by: NURSE PRACTITIONER

## 2023-10-13 PROCEDURE — 80053 COMPREHEN METABOLIC PANEL: CPT

## 2023-10-13 PROCEDURE — 93005 ELECTROCARDIOGRAM TRACING: CPT | Performed by: EMERGENCY MEDICINE

## 2023-10-13 RX ORDER — ACETAMINOPHEN 650 MG/1
650 SUPPOSITORY RECTAL EVERY 6 HOURS PRN
Status: DISCONTINUED | OUTPATIENT
Start: 2023-10-13 | End: 2023-10-16 | Stop reason: HOSPADM

## 2023-10-13 RX ORDER — CARVEDILOL 3.12 MG/1
3.12 TABLET ORAL 2 TIMES DAILY WITH MEALS
Status: DISCONTINUED | OUTPATIENT
Start: 2023-10-13 | End: 2023-10-14

## 2023-10-13 RX ORDER — ONDANSETRON 4 MG/1
4 TABLET, ORALLY DISINTEGRATING ORAL EVERY 8 HOURS PRN
Status: DISCONTINUED | OUTPATIENT
Start: 2023-10-13 | End: 2023-10-16 | Stop reason: HOSPADM

## 2023-10-13 RX ORDER — NITROGLYCERIN 0.4 MG/1
0.4 TABLET SUBLINGUAL EVERY 5 MIN PRN
Status: DISCONTINUED | OUTPATIENT
Start: 2023-10-13 | End: 2023-10-16 | Stop reason: HOSPADM

## 2023-10-13 RX ORDER — CLOPIDOGREL BISULFATE 75 MG/1
75 TABLET ORAL DAILY
Status: DISCONTINUED | OUTPATIENT
Start: 2023-10-14 | End: 2023-10-16 | Stop reason: HOSPADM

## 2023-10-13 RX ORDER — ASPIRIN 81 MG/1
81 TABLET, CHEWABLE ORAL DAILY
Status: DISCONTINUED | OUTPATIENT
Start: 2023-10-14 | End: 2023-10-16 | Stop reason: HOSPADM

## 2023-10-13 RX ORDER — ENOXAPARIN SODIUM 100 MG/ML
40 INJECTION SUBCUTANEOUS DAILY
Status: DISCONTINUED | OUTPATIENT
Start: 2023-10-13 | End: 2023-10-16 | Stop reason: HOSPADM

## 2023-10-13 RX ORDER — SODIUM CHLORIDE 9 MG/ML
INJECTION, SOLUTION INTRAVENOUS CONTINUOUS
Status: DISCONTINUED | OUTPATIENT
Start: 2023-10-13 | End: 2023-10-14

## 2023-10-13 RX ORDER — MAGNESIUM HYDROXIDE/ALUMINUM HYDROXICE/SIMETHICONE 120; 1200; 1200 MG/30ML; MG/30ML; MG/30ML
30 SUSPENSION ORAL EVERY 6 HOURS PRN
Status: DISCONTINUED | OUTPATIENT
Start: 2023-10-13 | End: 2023-10-16 | Stop reason: HOSPADM

## 2023-10-13 RX ORDER — NICOTINE 21 MG/24HR
1 PATCH, TRANSDERMAL 24 HOURS TRANSDERMAL DAILY
Status: DISCONTINUED | OUTPATIENT
Start: 2023-10-13 | End: 2023-10-16 | Stop reason: HOSPADM

## 2023-10-13 RX ORDER — LORAZEPAM 1 MG/1
1 TABLET ORAL ONCE
Status: COMPLETED | OUTPATIENT
Start: 2023-10-13 | End: 2023-10-13

## 2023-10-13 RX ORDER — DULOXETIN HYDROCHLORIDE 60 MG/1
60 CAPSULE, DELAYED RELEASE ORAL DAILY
Status: DISCONTINUED | OUTPATIENT
Start: 2023-10-14 | End: 2023-10-16 | Stop reason: HOSPADM

## 2023-10-13 RX ORDER — PANTOPRAZOLE SODIUM 40 MG/1
40 TABLET, DELAYED RELEASE ORAL
Status: DISCONTINUED | OUTPATIENT
Start: 2023-10-14 | End: 2023-10-16 | Stop reason: HOSPADM

## 2023-10-13 RX ORDER — POLYETHYLENE GLYCOL 3350 17 G/17G
17 POWDER, FOR SOLUTION ORAL DAILY PRN
Status: DISCONTINUED | OUTPATIENT
Start: 2023-10-13 | End: 2023-10-16 | Stop reason: HOSPADM

## 2023-10-13 RX ORDER — LISINOPRIL 5 MG/1
2.5 TABLET ORAL DAILY
Status: DISCONTINUED | OUTPATIENT
Start: 2023-10-14 | End: 2023-10-14

## 2023-10-13 RX ORDER — SODIUM CHLORIDE 0.9 % (FLUSH) 0.9 %
5-40 SYRINGE (ML) INJECTION PRN
Status: DISCONTINUED | OUTPATIENT
Start: 2023-10-13 | End: 2023-10-16 | Stop reason: HOSPADM

## 2023-10-13 RX ORDER — SODIUM CHLORIDE 9 MG/ML
INJECTION, SOLUTION INTRAVENOUS PRN
Status: DISCONTINUED | OUTPATIENT
Start: 2023-10-13 | End: 2023-10-16 | Stop reason: HOSPADM

## 2023-10-13 RX ORDER — SODIUM CHLORIDE 0.9 % (FLUSH) 0.9 %
5-40 SYRINGE (ML) INJECTION EVERY 12 HOURS SCHEDULED
Status: DISCONTINUED | OUTPATIENT
Start: 2023-10-13 | End: 2023-10-16 | Stop reason: HOSPADM

## 2023-10-13 RX ORDER — ATORVASTATIN CALCIUM 80 MG/1
80 TABLET, FILM COATED ORAL NIGHTLY
Status: DISCONTINUED | OUTPATIENT
Start: 2023-10-13 | End: 2023-10-16 | Stop reason: HOSPADM

## 2023-10-13 RX ORDER — ACETAMINOPHEN 325 MG/1
650 TABLET ORAL EVERY 6 HOURS PRN
Status: DISCONTINUED | OUTPATIENT
Start: 2023-10-13 | End: 2023-10-16 | Stop reason: HOSPADM

## 2023-10-13 RX ORDER — LORAZEPAM 1 MG/1
1 TABLET ORAL EVERY 6 HOURS PRN
Status: DISPENSED | OUTPATIENT
Start: 2023-10-13 | End: 2023-10-15

## 2023-10-13 RX ORDER — ONDANSETRON 2 MG/ML
4 INJECTION INTRAMUSCULAR; INTRAVENOUS EVERY 6 HOURS PRN
Status: DISCONTINUED | OUTPATIENT
Start: 2023-10-13 | End: 2023-10-16 | Stop reason: HOSPADM

## 2023-10-13 RX ORDER — KETOROLAC TROMETHAMINE 30 MG/ML
30 INJECTION, SOLUTION INTRAMUSCULAR; INTRAVENOUS EVERY 6 HOURS PRN
Status: DISCONTINUED | OUTPATIENT
Start: 2023-10-13 | End: 2023-10-16

## 2023-10-13 RX ORDER — 0.9 % SODIUM CHLORIDE 0.9 %
1000 INTRAVENOUS SOLUTION INTRAVENOUS ONCE
Status: COMPLETED | OUTPATIENT
Start: 2023-10-13 | End: 2023-10-13

## 2023-10-13 RX ADMIN — CARVEDILOL 3.12 MG: 3.12 TABLET, FILM COATED ORAL at 17:00

## 2023-10-13 RX ADMIN — LORAZEPAM 1 MG: 1 TABLET ORAL at 13:41

## 2023-10-13 RX ADMIN — SODIUM CHLORIDE: 9 INJECTION, SOLUTION INTRAVENOUS at 17:16

## 2023-10-13 RX ADMIN — ATORVASTATIN CALCIUM 80 MG: 80 TABLET, FILM COATED ORAL at 20:02

## 2023-10-13 RX ADMIN — ENOXAPARIN SODIUM 40 MG: 100 INJECTION SUBCUTANEOUS at 17:10

## 2023-10-13 RX ADMIN — SODIUM CHLORIDE 1000 ML: 9 INJECTION, SOLUTION INTRAVENOUS at 15:12

## 2023-10-13 RX ADMIN — LORAZEPAM 1 MG: 1 TABLET ORAL at 20:02

## 2023-10-13 RX ADMIN — Medication 10 ML: at 20:02

## 2023-10-13 ASSESSMENT — ENCOUNTER SYMPTOMS
VOMITING: 0
COUGH: 0
EYE PAIN: 0
ABDOMINAL PAIN: 0
CONSTIPATION: 0
NAUSEA: 0
DIARRHEA: 0
RHINORRHEA: 0
BACK PAIN: 0
SORE THROAT: 0
SHORTNESS OF BREATH: 0

## 2023-10-13 NOTE — ED NOTES
Report given to [facility] RN. Pt transported to [facility] at this time by transport and [facility] [place]. Pt remains safe at time of transfer.      Sulaiman Dixon RN  10/13/23 154

## 2023-10-13 NOTE — ACP (ADVANCE CARE PLANNING)
Advanced Care Planning Note. Purpose of Encounter: Advanced care planning in light of CAD  Parties In Attendance: Patient  Decisional Capacity: Yes  Subjective: Patient with CP and suicidal ideation  Objective: Cr 0.6  Goals of Care Determination: Patient wants full support (CPR, vent, surgery, HD, trach, PEG)  Plan:  Sitter. SPECT. Cardio and Psych consults  Code Status: Full code   Time spent on Advanced care Plannin minutes  Advanced Care Planning Documents: Completed advanced directives on chart, Lauren Alvarez is the POA.     Raulito Garcia MD  10/13/2023 3:05 PM

## 2023-10-13 NOTE — ED NOTES
Pt placed on SI hold. Room made safe and pt placed in gown without ties. All personal belongings collected and given to security. Pt personal meds collected and taken to pharmacy. Sitter bedside.       Elijah Arellano RN  10/13/23 3295

## 2023-10-13 NOTE — ED NOTES
Patient states \"I just want to end it all, I'm sick of feeling like this\". Patient states suicidal thoughts have been getting worse the past 2 days. Patient states cut her wrist 2 days ago, states \"I will probably cut today when I go home. When can I go home so I can just end it all\".       Terence Gomes, RN  10/13/23 [address] Chaitanya, RN  10/13/23 3903

## 2023-10-13 NOTE — ED NOTES
Pt remains safe and sitter is bedside. Monitors in place. Denies needs at this time.       Antonio Smith, RN  10/13/23 4794

## 2023-10-13 NOTE — ED NOTES
Pt placed on hold for SI, all belongings collected and given to security, meds taken to pharmacy. Room made safe, placed in gown without ties.       Garrick Read RN  10/13/23 2375

## 2023-10-14 PROBLEM — F10.90 ALCOHOL USE DISORDER: Status: ACTIVE | Noted: 2023-10-14

## 2023-10-14 PROBLEM — F12.90 CANNABIS USE DISORDER: Status: ACTIVE | Noted: 2021-07-19

## 2023-10-14 LAB
ANION GAP SERPL CALCULATED.3IONS-SCNC: 11 MMOL/L (ref 3–16)
BASOPHILS # BLD: 0 K/UL (ref 0–0.2)
BASOPHILS NFR BLD: 0.8 %
BUN SERPL-MCNC: 15 MG/DL (ref 7–20)
CALCIUM SERPL-MCNC: 9 MG/DL (ref 8.3–10.6)
CHLORIDE SERPL-SCNC: 104 MMOL/L (ref 99–110)
CO2 SERPL-SCNC: 24 MMOL/L (ref 21–32)
CREAT SERPL-MCNC: 0.6 MG/DL (ref 0.6–1.1)
DEPRECATED RDW RBC AUTO: 13 % (ref 12.4–15.4)
EOSINOPHIL # BLD: 0.3 K/UL (ref 0–0.6)
EOSINOPHIL NFR BLD: 5.9 %
GFR SERPLBLD CREATININE-BSD FMLA CKD-EPI: >60 ML/MIN/{1.73_M2}
GLUCOSE SERPL-MCNC: 102 MG/DL (ref 70–99)
HCT VFR BLD AUTO: 36.6 % (ref 36–48)
HGB BLD-MCNC: 12.4 G/DL (ref 12–16)
LYMPHOCYTES # BLD: 2.1 K/UL (ref 1–5.1)
LYMPHOCYTES NFR BLD: 36.9 %
MCH RBC QN AUTO: 34 PG (ref 26–34)
MCHC RBC AUTO-ENTMCNC: 34 G/DL (ref 31–36)
MCV RBC AUTO: 100 FL (ref 80–100)
MONOCYTES # BLD: 0.4 K/UL (ref 0–1.3)
MONOCYTES NFR BLD: 7 %
NEUTROPHILS # BLD: 2.8 K/UL (ref 1.7–7.7)
NEUTROPHILS NFR BLD: 49.4 %
PLATELET # BLD AUTO: 326 K/UL (ref 135–450)
PMV BLD AUTO: 7.7 FL (ref 5–10.5)
POTASSIUM SERPL-SCNC: 4.3 MMOL/L (ref 3.5–5.1)
RBC # BLD AUTO: 3.66 M/UL (ref 4–5.2)
SODIUM SERPL-SCNC: 139 MMOL/L (ref 136–145)
TROPONIN, HIGH SENSITIVITY: 7 NG/L (ref 0–14)
TROPONIN, HIGH SENSITIVITY: <6 NG/L (ref 0–14)
WBC # BLD AUTO: 5.7 K/UL (ref 4–11)

## 2023-10-14 PROCEDURE — 84484 ASSAY OF TROPONIN QUANT: CPT

## 2023-10-14 PROCEDURE — A9502 TC99M TETROFOSMIN: HCPCS | Performed by: STUDENT IN AN ORGANIZED HEALTH CARE EDUCATION/TRAINING PROGRAM

## 2023-10-14 PROCEDURE — 93017 CV STRESS TEST TRACING ONLY: CPT | Performed by: INTERNAL MEDICINE

## 2023-10-14 PROCEDURE — 1200000000 HC SEMI PRIVATE

## 2023-10-14 PROCEDURE — 3430000000 HC RX DIAGNOSTIC RADIOPHARMACEUTICAL: Performed by: STUDENT IN AN ORGANIZED HEALTH CARE EDUCATION/TRAINING PROGRAM

## 2023-10-14 PROCEDURE — 85025 COMPLETE CBC W/AUTO DIFF WBC: CPT

## 2023-10-14 PROCEDURE — 6360000002 HC RX W HCPCS: Performed by: STUDENT IN AN ORGANIZED HEALTH CARE EDUCATION/TRAINING PROGRAM

## 2023-10-14 PROCEDURE — 2580000003 HC RX 258: Performed by: INTERNAL MEDICINE

## 2023-10-14 PROCEDURE — 99222 1ST HOSP IP/OBS MODERATE 55: CPT | Performed by: PSYCHIATRY & NEUROLOGY

## 2023-10-14 PROCEDURE — 6370000000 HC RX 637 (ALT 250 FOR IP): Performed by: INTERNAL MEDICINE

## 2023-10-14 PROCEDURE — 80048 BASIC METABOLIC PNL TOTAL CA: CPT

## 2023-10-14 PROCEDURE — 99232 SBSQ HOSP IP/OBS MODERATE 35: CPT | Performed by: NURSE PRACTITIONER

## 2023-10-14 PROCEDURE — 78452 HT MUSCLE IMAGE SPECT MULT: CPT | Performed by: INTERNAL MEDICINE

## 2023-10-14 PROCEDURE — 36415 COLL VENOUS BLD VENIPUNCTURE: CPT

## 2023-10-14 PROCEDURE — 2060000000 HC ICU INTERMEDIATE R&B

## 2023-10-14 PROCEDURE — 6370000000 HC RX 637 (ALT 250 FOR IP): Performed by: NURSE PRACTITIONER

## 2023-10-14 PROCEDURE — 6360000002 HC RX W HCPCS: Performed by: INTERNAL MEDICINE

## 2023-10-14 RX ORDER — GUAIFENESIN 600 MG/1
600 TABLET, EXTENDED RELEASE ORAL 2 TIMES DAILY
Status: DISCONTINUED | OUTPATIENT
Start: 2023-10-14 | End: 2023-10-16 | Stop reason: HOSPADM

## 2023-10-14 RX ORDER — AMINOPHYLLINE DIHYDRATE 25 MG/ML
100 INJECTION, SOLUTION INTRAVENOUS ONCE
Status: COMPLETED | OUTPATIENT
Start: 2023-10-14 | End: 2023-10-14

## 2023-10-14 RX ORDER — LISINOPRIL 5 MG/1
5 TABLET ORAL DAILY
Status: DISCONTINUED | OUTPATIENT
Start: 2023-10-15 | End: 2023-10-16 | Stop reason: HOSPADM

## 2023-10-14 RX ORDER — CARVEDILOL 6.25 MG/1
6.25 TABLET ORAL 2 TIMES DAILY WITH MEALS
Status: DISCONTINUED | OUTPATIENT
Start: 2023-10-14 | End: 2023-10-16 | Stop reason: HOSPADM

## 2023-10-14 RX ORDER — REGADENOSON 0.08 MG/ML
0.4 INJECTION, SOLUTION INTRAVENOUS
Status: COMPLETED | OUTPATIENT
Start: 2023-10-14 | End: 2023-10-14

## 2023-10-14 RX ADMIN — ACETAMINOPHEN 650 MG: 325 TABLET ORAL at 21:57

## 2023-10-14 RX ADMIN — CLOPIDOGREL BISULFATE 75 MG: 75 TABLET ORAL at 10:44

## 2023-10-14 RX ADMIN — GUAIFENESIN 600 MG: 600 TABLET, EXTENDED RELEASE ORAL at 21:09

## 2023-10-14 RX ADMIN — DULOXETINE HYDROCHLORIDE 60 MG: 60 CAPSULE, DELAYED RELEASE ORAL at 10:44

## 2023-10-14 RX ADMIN — CARVEDILOL 6.25 MG: 6.25 TABLET, FILM COATED ORAL at 16:55

## 2023-10-14 RX ADMIN — TETROFOSMIN 10 MILLICURIE: 1.38 INJECTION, POWDER, LYOPHILIZED, FOR SOLUTION INTRAVENOUS at 07:54

## 2023-10-14 RX ADMIN — AMINOPHYLLINE 100 MG: 25 INJECTION, SOLUTION INTRAVENOUS at 08:55

## 2023-10-14 RX ADMIN — LISINOPRIL 2.5 MG: 5 TABLET ORAL at 10:44

## 2023-10-14 RX ADMIN — ASPIRIN 81 MG: 81 TABLET, CHEWABLE ORAL at 10:44

## 2023-10-14 RX ADMIN — SODIUM CHLORIDE: 9 INJECTION, SOLUTION INTRAVENOUS at 05:57

## 2023-10-14 RX ADMIN — LORAZEPAM 1 MG: 1 TABLET ORAL at 13:02

## 2023-10-14 RX ADMIN — CARVEDILOL 3.12 MG: 3.12 TABLET, FILM COATED ORAL at 10:44

## 2023-10-14 RX ADMIN — Medication 10 ML: at 10:45

## 2023-10-14 RX ADMIN — PANTOPRAZOLE SODIUM 40 MG: 40 TABLET, DELAYED RELEASE ORAL at 05:30

## 2023-10-14 RX ADMIN — ATORVASTATIN CALCIUM 80 MG: 80 TABLET, FILM COATED ORAL at 21:09

## 2023-10-14 RX ADMIN — LORAZEPAM 1 MG: 1 TABLET ORAL at 21:57

## 2023-10-14 RX ADMIN — Medication 10 ML: at 21:08

## 2023-10-14 RX ADMIN — KETOROLAC TROMETHAMINE 30 MG: 30 INJECTION, SOLUTION INTRAMUSCULAR at 05:53

## 2023-10-14 RX ADMIN — REGADENOSON 0.4 MG: 0.08 INJECTION, SOLUTION INTRAVENOUS at 08:42

## 2023-10-14 RX ADMIN — TETROFOSMIN 30 MILLICURIE: 1.38 INJECTION, POWDER, LYOPHILIZED, FOR SOLUTION INTRAVENOUS at 08:58

## 2023-10-14 ASSESSMENT — PAIN SCALES - GENERAL
PAINLEVEL_OUTOF10: 0
PAINLEVEL_OUTOF10: 8
PAINLEVEL_OUTOF10: 0

## 2023-10-14 ASSESSMENT — PAIN DESCRIPTION - LOCATION: LOCATION: ABDOMEN

## 2023-10-14 ASSESSMENT — PAIN DESCRIPTION - ORIENTATION: ORIENTATION: LOWER

## 2023-10-14 ASSESSMENT — PAIN DESCRIPTION - DESCRIPTORS: DESCRIPTORS: SHOOTING;STABBING

## 2023-10-14 NOTE — PLAN OF CARE
Problem: Risk for Elopement  Goal: Patient will not exit the unit/facility without proper excort  Outcome: Progressing  Flowsheets (Taken 10/13/2023 4218 by Dave Murray RN)  Nursing Interventions for Elopement Risk:   Assist with personal care needs such as toileting, eating, dressing, as needed to reduce the risk of wandering   Communicate/escalate to charge nurse the risk of elopement   Communicate/escalate to nursing supervisor the risk of elopement   Place patient in room far away from exits and stairways

## 2023-10-14 NOTE — CARE COORDINATION
10/14/23 1453   Readmission Assessment   Number of Days since last admission? 8-30 days   Previous Disposition Home Alone   Who is being Interviewed Patient  (Patient NOT interviewed, no other appropriate option available. Chart reviewed to complete Readmit.)   What was the patient's/caregiver's perception as to why they think they needed to return back to the hospital? [address] discharge on prior admission   Did you visit your Primary Care Physician after you left the hospital, before you returned this time? No   Why weren't you able to visit your PCP? Did not have an appointment   Did you see a specialist, such as Cardiac, Pulmonary, Orthopedic Physician, etc. after you left the hospital? No  (Patient was informed of follow-up Cardiology appointment by Cardiology Nurse via phone call after leaving AMA last admission.)   Who advised the patient to return to the hospital? Self-referral   Does the patient report anything that got in the way of taking their medications? No   In our efforts to provide the best possible care to you and others like you, can you think of anything that we could have done to help you after you left the hospital the first time, so that you might not have needed to return so soon?  Other (Comment)  (Patient left AMA last admission and this is admissions patient presents with chest pain and suicidal ideation.)     Electronically signed by RADHA Braga on 10/14/2023 at 3:06 PM

## 2023-10-14 NOTE — CONSULTS
Major depressive disorder, recurrent, severe  Borderline personality disorder  Alcohol use disorder, remission status unspecified  Cannabis use disorder      RECOMMENDATIONS:   1. Patient requires inpatient psychiatric hospitalization as the least restrictive means to ensure hers safety. Historically this tends to help her stabilize in a short period of time   2. Continue duloxetine    Dispo: Inpatient psych when medically stable  SAfety: RF include prior suicide attempt, personality disorder, substance use disorder, chronic medical problems, single/relationship stress, ? PTSD. Pt is high risk for future dangerousness to self. At this time she represents a potentially imminent safety risk and requires involuntary inpatient psychiatric admission as the least restrictive means to ensure her safety. Thank you for this consult, please call the psychiatry consult line for further questions.  I will continue to follow            Balbir Roberts MD   Psychiatrist
ideation/Bipolar  - Defer management to behavioral health, however she is still within 30 days of her PCI and does require DAPT therapy, anything we can do to help her with continued compliance I am all for.       ---    I will address the patient's cardiac risk factors and adjusted pharmacologic treatment as needed. In addition, I have reinforced the need for patient directed risk factor modification. Tobacco use was discussed with the patient and educated on the negative effects. I have asked the patient to not utilize these agents. Thank you for allowing to us to participate in the care or Baron Kaminski. Further evaluation will be based upon the patient's clinical course and testing results. All questions and concerns were addressed to the patient/family. Alternatives to my treatment were discussed. The note was completed using EMR. Every effort was made to ensure accuracy; however, inadvertent computerized transcription errors may be present. Soledad Yousif MD  Interventional Cardiology  10/13/2023  4:06 PM    [address] [place] [address], [address]  Ph: [phone]  Fax: [phone]    NOTE: This report was transcribed using voice recognition software. Every effort was made to ensure accuracy, however, inadvertent computerized transcription errors may be present.

## 2023-10-14 NOTE — ED PROVIDER NOTES
In addition to the advanced practice provider, I personally saw David Washburn and performed a substantive portion of the visit including all aspects of the medical decision making. Briefly, this is a 65 y.o. female here for chest pain and suicidal ideation. Patient reports history of depression and anxiety, over the past few days she has had increased thoughts of self-harm, plans to go home and cut herself if she leaves the emergency department today. She has also had aching and pressure-like pain in her left chest, states underwent coronary artery stenting about 1 month ago for a heart attack. She reports adherence to her aspirin and Plavix, also received additional 243mg aspirin by EMS in route today. She denies acting on her thoughts of self-harm. On exam, patient afebrile and nontoxic. She appears anxious however in no painful or respiratory distress. Heart RRR. Lungs CTAB. Abdomen soft, nondistended, nontender to palpation in all quadrants. A&Ox4. Speech clear, face symmetric. CN 2-12 intact. 5/5 motor and sensation grossly intact all extremities. No pronator drift. EKG  EKG was reviewed by emergency department physician in the absence of a cardiologist    Narrow complex sinus rhythm, rate 68, normal axis, normal CT and QRS intervals, normal Qtc, no ST elevations or depressions, TWI I, aVL, V2-V6, impression NSR with nonspecific T wave morphology, no STEMI, T wave inversions in septal and lateral leads appears new from comparison 9/18/2023      Screenings   Sunil Coma Scale  Eye Opening: Spontaneous  Best Verbal Response: Oriented  Best Motor Response: Obeys commands  Sunil Coma Scale Score: 15      Is this patient to be included in the SEP-1 Core Measure due to severe sepsis or septic shock? No   Exclusion criteria - the patient is NOT to be included for SEP-1 Core Measure due to: Infection is not suspected      MDM    Patient afebrile and nontoxic.   She is anxious however in no acute
negative. Hold was placed on patient due to her suicidal ideations however given her EKG changes felt she needed to be admitted for further monitoring due to her chest pain before trasfer to psych. Patient will need to be admitted to the hospital due to the EKG changes found today with recent cardiac stent. [employer] sent to the hospitalist for admission. I am the Primary Clinician of Record. FINAL IMPRESSION      1. Chest pain, unspecified type    2. Suicidal ideation          DISPOSITION/PLAN     DISPOSITION Admitted 10/13/2023 03:04:21 PM      PATIENT REFERRED TO:  No follow-up provider specified.     DISCHARGE MEDICATIONS:  Current Discharge Medication List          DISCONTINUED MEDICATIONS:  Current Discharge Medication List                 (Please note that portions of this note were completed with a voice recognition program.  Efforts were made to edit the dictations but occasionally words are mis-transcribed.)    SUNDAR Rubio (electronically signed)           Lena Rubio  10/13/23 1908

## 2023-10-15 LAB
ANION GAP SERPL CALCULATED.3IONS-SCNC: 11 MMOL/L (ref 3–16)
BASOPHILS # BLD: 0 K/UL (ref 0–0.2)
BASOPHILS NFR BLD: 0.7 %
BUN SERPL-MCNC: 16 MG/DL (ref 7–20)
CALCIUM SERPL-MCNC: 9.1 MG/DL (ref 8.3–10.6)
CHLORIDE SERPL-SCNC: 105 MMOL/L (ref 99–110)
CO2 SERPL-SCNC: 25 MMOL/L (ref 21–32)
CREAT SERPL-MCNC: 0.7 MG/DL (ref 0.6–1.1)
DEPRECATED RDW RBC AUTO: 12.9 % (ref 12.4–15.4)
EOSINOPHIL # BLD: 0.4 K/UL (ref 0–0.6)
EOSINOPHIL NFR BLD: 5.8 %
GFR SERPLBLD CREATININE-BSD FMLA CKD-EPI: >60 ML/MIN/{1.73_M2}
GLUCOSE SERPL-MCNC: 117 MG/DL (ref 70–99)
HCT VFR BLD AUTO: 38.5 % (ref 36–48)
HGB BLD-MCNC: 13.1 G/DL (ref 12–16)
LYMPHOCYTES # BLD: 2.1 K/UL (ref 1–5.1)
LYMPHOCYTES NFR BLD: 35 %
MCH RBC QN AUTO: 33.9 PG (ref 26–34)
MCHC RBC AUTO-ENTMCNC: 34 G/DL (ref 31–36)
MCV RBC AUTO: 99.7 FL (ref 80–100)
MONOCYTES # BLD: 0.4 K/UL (ref 0–1.3)
MONOCYTES NFR BLD: 6.4 %
NEUTROPHILS # BLD: 3.2 K/UL (ref 1.7–7.7)
NEUTROPHILS NFR BLD: 52.1 %
PLATELET # BLD AUTO: 334 K/UL (ref 135–450)
PMV BLD AUTO: 7.9 FL (ref 5–10.5)
POTASSIUM SERPL-SCNC: 4.3 MMOL/L (ref 3.5–5.1)
RBC # BLD AUTO: 3.86 M/UL (ref 4–5.2)
SODIUM SERPL-SCNC: 141 MMOL/L (ref 136–145)
WBC # BLD AUTO: 6.1 K/UL (ref 4–11)

## 2023-10-15 PROCEDURE — 6370000000 HC RX 637 (ALT 250 FOR IP): Performed by: INTERNAL MEDICINE

## 2023-10-15 PROCEDURE — 99231 SBSQ HOSP IP/OBS SF/LOW 25: CPT | Performed by: PSYCHIATRY & NEUROLOGY

## 2023-10-15 PROCEDURE — 99231 SBSQ HOSP IP/OBS SF/LOW 25: CPT | Performed by: NURSE PRACTITIONER

## 2023-10-15 PROCEDURE — 2580000003 HC RX 258: Performed by: INTERNAL MEDICINE

## 2023-10-15 PROCEDURE — 80048 BASIC METABOLIC PNL TOTAL CA: CPT

## 2023-10-15 PROCEDURE — 1200000000 HC SEMI PRIVATE

## 2023-10-15 PROCEDURE — 6370000000 HC RX 637 (ALT 250 FOR IP): Performed by: NURSE PRACTITIONER

## 2023-10-15 PROCEDURE — 2060000000 HC ICU INTERMEDIATE R&B

## 2023-10-15 PROCEDURE — 85025 COMPLETE CBC W/AUTO DIFF WBC: CPT

## 2023-10-15 PROCEDURE — 36415 COLL VENOUS BLD VENIPUNCTURE: CPT

## 2023-10-15 PROCEDURE — 6360000002 HC RX W HCPCS: Performed by: INTERNAL MEDICINE

## 2023-10-15 RX ADMIN — LISINOPRIL 5 MG: 5 TABLET ORAL at 08:23

## 2023-10-15 RX ADMIN — CARVEDILOL 6.25 MG: 6.25 TABLET, FILM COATED ORAL at 08:23

## 2023-10-15 RX ADMIN — GUAIFENESIN 600 MG: 600 TABLET, EXTENDED RELEASE ORAL at 19:50

## 2023-10-15 RX ADMIN — CARVEDILOL 6.25 MG: 6.25 TABLET, FILM COATED ORAL at 17:18

## 2023-10-15 RX ADMIN — LORAZEPAM 1 MG: 1 TABLET ORAL at 13:42

## 2023-10-15 RX ADMIN — ACETAMINOPHEN 650 MG: 325 TABLET ORAL at 13:39

## 2023-10-15 RX ADMIN — ATORVASTATIN CALCIUM 80 MG: 80 TABLET, FILM COATED ORAL at 19:50

## 2023-10-15 RX ADMIN — DULOXETINE HYDROCHLORIDE 60 MG: 60 CAPSULE, DELAYED RELEASE ORAL at 08:23

## 2023-10-15 RX ADMIN — Medication 10 ML: at 08:33

## 2023-10-15 RX ADMIN — PANTOPRAZOLE SODIUM 40 MG: 40 TABLET, DELAYED RELEASE ORAL at 08:23

## 2023-10-15 RX ADMIN — ENOXAPARIN SODIUM 40 MG: 100 INJECTION SUBCUTANEOUS at 08:32

## 2023-10-15 RX ADMIN — GUAIFENESIN 600 MG: 600 TABLET, EXTENDED RELEASE ORAL at 08:23

## 2023-10-15 RX ADMIN — BENZOCAINE AND MENTHOL 1 LOZENGE: 15; 3.6 LOZENGE ORAL at 09:42

## 2023-10-15 RX ADMIN — Medication 10 ML: at 19:51

## 2023-10-15 RX ADMIN — CLOPIDOGREL BISULFATE 75 MG: 75 TABLET ORAL at 08:23

## 2023-10-15 RX ADMIN — ASPIRIN 81 MG: 81 TABLET, CHEWABLE ORAL at 08:23

## 2023-10-15 ASSESSMENT — PAIN DESCRIPTION - LOCATION
LOCATION: THROAT
LOCATION: HEAD

## 2023-10-15 ASSESSMENT — PAIN DESCRIPTION - DESCRIPTORS
DESCRIPTORS: SORE
DESCRIPTORS: ACHING

## 2023-10-15 ASSESSMENT — PAIN SCALES - GENERAL: PAINLEVEL_OUTOF10: 7

## 2023-10-15 NOTE — PLAN OF CARE
Problem: Discharge Planning  Goal: Discharge to home or other facility with appropriate resources  Outcome: Progressing     Problem: Risk for Elopement  Goal: Patient will not exit the unit/facility without proper excort  10/15/2023 0727 by Lady Emily RN  Outcome: Progressing     Problem: Pain  Goal: Verbalizes/displays adequate comfort level or baseline comfort level  Outcome: Progressing

## 2023-10-15 NOTE — DISCHARGE INSTRUCTIONS
You can do any of the following for psychiatric emergencies:   Call the [facility] psychiatric emergency hotline at [address]- Call the 24/7 Crisis line at [phone]  3. Access the [facility] Emergency Psychiatry Services:     - Go to the [facility] Psychiatric Emergency Services (PES) at [address], [address]   - Call the [address] at [phone].    - Call the [facility] Mobile Crisis Team at [phone]   4. Go to any emergency room or call 911  5. Visit or call [facility] at [address]  [facility] [address], [address] [phone]. Coronary Angiogram: About This Test  What is a coronary angiogram?     A coronary angiogram is a test to look at the large blood vessels of your heart (coronary arteries). These blood vessels feed blood, oxygen, and nutrients to your heart. Why is this test done? This test is done to check blood flow in your coronary arteries. It can show the size and location of narrowed or blocked sections of an artery. It's done for people who have coronary artery disease, also known as heart disease. The test can show how serious the disease is and how best to treat it. Or it can be done for people who have symptoms of heart disease to find out if there is a problem with the artery. The Doctor can look at the shape of your heart, the motion of the heart, and valves in the heart. What happens during the test?  You will get medicine to help you relax. A thin tube called a catheter is put into a blood vessel in your groin or arm. You will get a shot to numb the skin where the catheter goes in. You may feel pressure when the doctor moves the catheter through your blood vessel into your heart. Dye is put into your coronary arteries through the catheter. Your doctor can see the dye as it moves through the arteries. This lets your doctor look for areas that are narrowed or blocked.   You may feel hot or flushed for

## 2023-10-15 NOTE — PLAN OF CARE
Problem: Risk for Elopement  Goal: Patient will not exit the unit/facility without proper excort  Outcome: Progressing

## 2023-10-16 VITALS
OXYGEN SATURATION: 94 % | HEART RATE: 66 BPM | TEMPERATURE: 98 F | HEIGHT: 64 IN | BODY MASS INDEX: 27.04 KG/M2 | SYSTOLIC BLOOD PRESSURE: 112 MMHG | DIASTOLIC BLOOD PRESSURE: 73 MMHG | RESPIRATION RATE: 16 BRPM | WEIGHT: 158.4 LBS

## 2023-10-16 LAB
ANION GAP SERPL CALCULATED.3IONS-SCNC: 10 MMOL/L (ref 3–16)
BASOPHILS # BLD: 0.1 K/UL (ref 0–0.2)
BASOPHILS NFR BLD: 0.9 %
BUN SERPL-MCNC: 15 MG/DL (ref 7–20)
CALCIUM SERPL-MCNC: 9.8 MG/DL (ref 8.3–10.6)
CHLORIDE SERPL-SCNC: 102 MMOL/L (ref 99–110)
CO2 SERPL-SCNC: 26 MMOL/L (ref 21–32)
CREAT SERPL-MCNC: 0.7 MG/DL (ref 0.6–1.1)
DEPRECATED RDW RBC AUTO: 12.9 % (ref 12.4–15.4)
EOSINOPHIL # BLD: 0.4 K/UL (ref 0–0.6)
EOSINOPHIL NFR BLD: 5.8 %
GFR SERPLBLD CREATININE-BSD FMLA CKD-EPI: >60 ML/MIN/{1.73_M2}
GLUCOSE SERPL-MCNC: 124 MG/DL (ref 70–99)
HCT VFR BLD AUTO: 39.5 % (ref 36–48)
HGB BLD-MCNC: 13.2 G/DL (ref 12–16)
LYMPHOCYTES # BLD: 2.1 K/UL (ref 1–5.1)
LYMPHOCYTES NFR BLD: 32.9 %
MCH RBC QN AUTO: 33.4 PG (ref 26–34)
MCHC RBC AUTO-ENTMCNC: 33.5 G/DL (ref 31–36)
MCV RBC AUTO: 99.8 FL (ref 80–100)
MONOCYTES # BLD: 0.4 K/UL (ref 0–1.3)
MONOCYTES NFR BLD: 6 %
NEUTROPHILS # BLD: 3.5 K/UL (ref 1.7–7.7)
NEUTROPHILS NFR BLD: 54.4 %
PLATELET # BLD AUTO: 340 K/UL (ref 135–450)
PMV BLD AUTO: 7.6 FL (ref 5–10.5)
POTASSIUM SERPL-SCNC: 4.4 MMOL/L (ref 3.5–5.1)
RBC # BLD AUTO: 3.95 M/UL (ref 4–5.2)
SODIUM SERPL-SCNC: 138 MMOL/L (ref 136–145)
WBC # BLD AUTO: 6.4 K/UL (ref 4–11)

## 2023-10-16 PROCEDURE — 2580000003 HC RX 258: Performed by: INTERNAL MEDICINE

## 2023-10-16 PROCEDURE — C1894 INTRO/SHEATH, NON-LASER: HCPCS

## 2023-10-16 PROCEDURE — 80048 BASIC METABOLIC PNL TOTAL CA: CPT

## 2023-10-16 PROCEDURE — 2500000003 HC RX 250 WO HCPCS

## 2023-10-16 PROCEDURE — 2709999900 HC NON-CHARGEABLE SUPPLY

## 2023-10-16 PROCEDURE — 6370000000 HC RX 637 (ALT 250 FOR IP): Performed by: INTERNAL MEDICINE

## 2023-10-16 PROCEDURE — 6370000000 HC RX 637 (ALT 250 FOR IP): Performed by: NURSE PRACTITIONER

## 2023-10-16 PROCEDURE — 6360000004 HC RX CONTRAST MEDICATION: Performed by: INTERNAL MEDICINE

## 2023-10-16 PROCEDURE — 93458 L HRT ARTERY/VENTRICLE ANGIO: CPT

## 2023-10-16 PROCEDURE — 6360000002 HC RX W HCPCS

## 2023-10-16 PROCEDURE — 99152 MOD SED SAME PHYS/QHP 5/>YRS: CPT

## 2023-10-16 PROCEDURE — 93458 L HRT ARTERY/VENTRICLE ANGIO: CPT | Performed by: INTERNAL MEDICINE

## 2023-10-16 PROCEDURE — 99152 MOD SED SAME PHYS/QHP 5/>YRS: CPT | Performed by: INTERNAL MEDICINE

## 2023-10-16 PROCEDURE — 85025 COMPLETE CBC W/AUTO DIFF WBC: CPT

## 2023-10-16 PROCEDURE — 99153 MOD SED SAME PHYS/QHP EA: CPT

## 2023-10-16 PROCEDURE — C1769 GUIDE WIRE: HCPCS

## 2023-10-16 PROCEDURE — 6360000002 HC RX W HCPCS: Performed by: INTERNAL MEDICINE

## 2023-10-16 PROCEDURE — 36415 COLL VENOUS BLD VENIPUNCTURE: CPT

## 2023-10-16 RX ORDER — LISINOPRIL 5 MG/1
5 TABLET ORAL DAILY
Qty: 30 TABLET | Refills: 0 | Status: SHIPPED | OUTPATIENT
Start: 2023-10-17

## 2023-10-16 RX ORDER — CARVEDILOL 6.25 MG/1
6.25 TABLET ORAL 2 TIMES DAILY WITH MEALS
Qty: 60 TABLET | Refills: 0 | Status: SHIPPED | OUTPATIENT
Start: 2023-10-16

## 2023-10-16 RX ORDER — KETOROLAC TROMETHAMINE 30 MG/ML
30 INJECTION, SOLUTION INTRAMUSCULAR; INTRAVENOUS EVERY 6 HOURS PRN
Status: DISCONTINUED | OUTPATIENT
Start: 2023-10-16 | End: 2023-10-16 | Stop reason: HOSPADM

## 2023-10-16 RX ORDER — ACETAMINOPHEN 325 MG/1
650 TABLET ORAL EVERY 4 HOURS PRN
Status: DISCONTINUED | OUTPATIENT
Start: 2023-10-16 | End: 2023-10-16 | Stop reason: HOSPADM

## 2023-10-16 RX ADMIN — DULOXETINE HYDROCHLORIDE 60 MG: 60 CAPSULE, DELAYED RELEASE ORAL at 08:19

## 2023-10-16 RX ADMIN — ASPIRIN 81 MG: 81 TABLET, CHEWABLE ORAL at 08:19

## 2023-10-16 RX ADMIN — IOPAMIDOL 27 ML: 755 INJECTION, SOLUTION INTRAVENOUS at 11:28

## 2023-10-16 RX ADMIN — KETOROLAC TROMETHAMINE 30 MG: 30 INJECTION, SOLUTION INTRAMUSCULAR at 08:19

## 2023-10-16 RX ADMIN — CARVEDILOL 6.25 MG: 6.25 TABLET, FILM COATED ORAL at 08:19

## 2023-10-16 RX ADMIN — ACETAMINOPHEN 650 MG: 325 TABLET ORAL at 00:28

## 2023-10-16 RX ADMIN — PANTOPRAZOLE SODIUM 40 MG: 40 TABLET, DELAYED RELEASE ORAL at 08:19

## 2023-10-16 RX ADMIN — CLOPIDOGREL BISULFATE 75 MG: 75 TABLET ORAL at 08:19

## 2023-10-16 RX ADMIN — LISINOPRIL 5 MG: 5 TABLET ORAL at 08:19

## 2023-10-16 RX ADMIN — Medication 10 ML: at 08:19

## 2023-10-16 RX ADMIN — GUAIFENESIN 600 MG: 600 TABLET, EXTENDED RELEASE ORAL at 08:19

## 2023-10-16 RX ADMIN — ONDANSETRON 4 MG: 2 INJECTION INTRAMUSCULAR; INTRAVENOUS at 04:41

## 2023-10-16 ASSESSMENT — PAIN DESCRIPTION - PAIN TYPE
TYPE: ACUTE PAIN
TYPE: ACUTE PAIN

## 2023-10-16 ASSESSMENT — PAIN SCALES - GENERAL
PAINLEVEL_OUTOF10: 4
PAINLEVEL_OUTOF10: 0
PAINLEVEL_OUTOF10: 8
PAINLEVEL_OUTOF10: 9
PAINLEVEL_OUTOF10: 8

## 2023-10-16 ASSESSMENT — PAIN DESCRIPTION - LOCATION
LOCATION: ABDOMEN
LOCATION: CHEST;ABDOMEN;LEG
LOCATION: HEAD

## 2023-10-16 ASSESSMENT — PAIN DESCRIPTION - DESCRIPTORS
DESCRIPTORS: ACHING;CRAMPING
DESCRIPTORS: ACHING

## 2023-10-16 ASSESSMENT — PAIN DESCRIPTION - FREQUENCY: FREQUENCY: CONTINUOUS

## 2023-10-16 ASSESSMENT — PAIN DESCRIPTION - ORIENTATION: ORIENTATION: LOWER;MID

## 2023-10-16 ASSESSMENT — PAIN DESCRIPTION - ONSET: ONSET: ON-GOING

## 2023-10-16 ASSESSMENT — PAIN - FUNCTIONAL ASSESSMENT: PAIN_FUNCTIONAL_ASSESSMENT: ACTIVITIES ARE NOT PREVENTED

## 2023-10-16 NOTE — OP NOTE
[facility] Operative Note     PROCEDURE SUMMARY   Procedure [facility]   Indication UA   Consent Obtained   Access RRA   US US guidance used to determine artery patency, size (>2mm), anatomic variations and ideal puncture location. Real-time US utilized concurrent with vascular needle entry into artery. Image(s) permanently recorded and reported in chart. Bleed Risk Low   Sedation Minimal conscious sedation for comfort. Independent trained observer pushed medications at my direction. Level of consciousness and vital signs/physiologic status monitored throughout the procedure (see start and stop times above, as well as medication dosages). Start Time 1058   Stop Time 1123   Versed 2mg   Fentanyl 100mcg   Contrast 27cc   Flouro 2.1min   EBL <94HP   Complicat None   Specimens None   Procedure Detail Patient taken to cath lab in postabsorptive state, informed consent obtained.   RRA prepped and draped in normal sterile fashion  Micro needle used to access artery With US guidance  Micro wire advanced into artery and 5/6 Slender sheath advanced over wire   JL 3.5 advanced over wire to engage LM coronary artery and image in multiple views  JL 3.5 exchanged over a wire for JR4 to engage RCA and image in multiple views  JR4 removed over J wire  Arterial hemostasis obtained with Radial band     FINDINGS   Artery Findings   LM Normal   LAD Widely patent stent   Cx Normal   RCA Distal 20%   LVEDP 7mmHg Normal 3-12mmHg   LVG Normal with EF >55% with Normal wall motion Normal >/= 55%   AVG Normal     INTERVENTION(S)   None    POST CATH  RECOMMENDATIONS   Continued aggressive medical therapy and risk factor modification

## 2023-10-16 NOTE — DISCHARGE SUMMARY
Michaela Sanchez MD  atorvastatin (LIPITOR) 80 MG tablet Take 1 tablet by mouth  nightly 9/18/23 Michaela Sanchez MD  carvedilol (COREG) 3.125 MG tablet Take 1 tablet by mouth 2  times daily (with meals) 9/18/23 Michaela Sanchez MD  DULoxetine (CYMBALTA) 20 MG extended release capsule Take 3  capsules by mouth daily 9/18/23 10/18/23 Michaela Sanchez MD  lisinopril (PRINIVIL;ZESTRIL) 2.5 MG tablet Take 1 tablet by  mouth daily 9/19/23 Michaela Sanchez MD   Signatures   ------------------------------------------------------------------  Electronically signed by Candis Lacy DO (Interpreting  physician) on 10/14/2023 at 12:22  ------------------------------------------------------------------      XR CHEST PORTABLE    Result Date: 10/13/2023  EXAMINATION: ONE XRAY VIEW OF THE CHEST 10/13/2023 1:26 pm COMPARISON: Chest x-ray dated 09/17/2023. HISTORY: ORDERING SYSTEM PROVIDED HISTORY: chest pain TECHNOLOGIST PROVIDED HISTORY: Reason for exam:->chest pain FINDINGS: HEART/MEDIASTINUM: The cardiomediastinal silhouette is stable. PLEURA/LUNGS: There are no focal consolidations or pleural effusions. There is no appreciable pneumothorax. BONES/SOFT TISSUE: No acute abnormality. No radiographic evidence of acute pulmonary disease. XR CHEST (2 VW)    Result Date: 9/17/2023  EXAMINATION: TWO XRAY VIEWS OF THE CHEST 9/17/2023 7:01 am COMPARISON: Chest radiograph dated 01/25/2022 HISTORY: ORDERING SYSTEM PROVIDED HISTORY: Pain TECHNOLOGIST PROVIDED HISTORY: Reason for exam:->Pain FINDINGS: Medical devices: None. Mediastinum/Heart: The cardiomediastinal contours are normal. Lungs: The lungs are clear. Pleura: No evidence of pleural effusion or pneumothorax. Bones/Soft tissues: No acute abnormalities are identified. No acute cardiopulmonary abnormality identified.        The patient was seen and examined on day of discharge and this discharge summary is in conjunction with any daily progress note from day of

## 2023-10-25 ENCOUNTER — HOSPITAL ENCOUNTER (EMERGENCY)
Age: 58
Discharge: PSYCHIATRIC OR SUBSTANCE USE FACILITY | End: 2023-10-25
Attending: INTERNAL MEDICINE
Payer: MEDICAID

## 2023-10-25 ENCOUNTER — HOSPITAL ENCOUNTER (INPATIENT)
Age: 58
LOS: 1 days | Discharge: HOME | DRG: 751 | End: 2023-10-26
Attending: PSYCHIATRY & NEUROLOGY | Admitting: PSYCHIATRY & NEUROLOGY
Payer: MEDICAID

## 2023-10-25 VITALS
DIASTOLIC BLOOD PRESSURE: 61 MMHG | HEART RATE: 77 BPM | BODY MASS INDEX: 27.19 KG/M2 | SYSTOLIC BLOOD PRESSURE: 116 MMHG | TEMPERATURE: 98.6 F | HEIGHT: 64 IN | RESPIRATION RATE: 19 BRPM | OXYGEN SATURATION: 99 %

## 2023-10-25 DIAGNOSIS — Z72.89 DELIBERATE SELF-CUTTING: ICD-10-CM

## 2023-10-25 DIAGNOSIS — F32.A DEPRESSION WITH SUICIDAL IDEATION: Primary | ICD-10-CM

## 2023-10-25 DIAGNOSIS — R45.851 DEPRESSION WITH SUICIDAL IDEATION: Primary | ICD-10-CM

## 2023-10-25 LAB
ALBUMIN SERPL-MCNC: 4.5 G/DL (ref 3.4–5)
ALBUMIN/GLOB SERPL: 1.6 {RATIO} (ref 1.1–2.2)
ALP SERPL-CCNC: 135 U/L (ref 40–129)
ALT SERPL-CCNC: 19 U/L (ref 10–40)
AMPHETAMINES UR QL SCN>1000 NG/ML: POSITIVE
ANION GAP SERPL CALCULATED.3IONS-SCNC: 13 MMOL/L (ref 3–16)
APAP SERPL-MCNC: <5 UG/ML (ref 10–30)
AST SERPL-CCNC: 19 U/L (ref 15–37)
BARBITURATES UR QL SCN>200 NG/ML: ABNORMAL
BASOPHILS # BLD: 0 K/UL (ref 0–0.2)
BASOPHILS NFR BLD: 0.6 %
BENZODIAZ UR QL SCN>200 NG/ML: ABNORMAL
BILIRUB SERPL-MCNC: 0.6 MG/DL (ref 0–1)
BUN SERPL-MCNC: 7 MG/DL (ref 7–20)
CALCIUM SERPL-MCNC: 9.3 MG/DL (ref 8.3–10.6)
CANNABINOIDS UR QL SCN>50 NG/ML: POSITIVE
CHLORIDE SERPL-SCNC: 101 MMOL/L (ref 99–110)
CO2 SERPL-SCNC: 24 MMOL/L (ref 21–32)
COCAINE UR QL SCN: ABNORMAL
CREAT SERPL-MCNC: 0.6 MG/DL (ref 0.6–1.1)
DEPRECATED RDW RBC AUTO: 13.1 % (ref 12.4–15.4)
DRUG SCREEN COMMENT UR-IMP: ABNORMAL
EOSINOPHIL # BLD: 0.2 K/UL (ref 0–0.6)
EOSINOPHIL NFR BLD: 2.5 %
ETHANOLAMINE SERPL-MCNC: NORMAL MG/DL (ref 0–0.08)
FENTANYL SCREEN, URINE: ABNORMAL
FLUAV RNA RESP QL NAA+PROBE: NOT DETECTED
FLUBV RNA RESP QL NAA+PROBE: NOT DETECTED
GFR SERPLBLD CREATININE-BSD FMLA CKD-EPI: >60 ML/MIN/{1.73_M2}
GLUCOSE SERPL-MCNC: 93 MG/DL (ref 70–99)
HCT VFR BLD AUTO: 38.9 % (ref 36–48)
HGB BLD-MCNC: 13 G/DL (ref 12–16)
LIPASE SERPL-CCNC: 30 U/L (ref 13–60)
LYMPHOCYTES # BLD: 1.7 K/UL (ref 1–5.1)
LYMPHOCYTES NFR BLD: 21.6 %
MCH RBC QN AUTO: 33.4 PG (ref 26–34)
MCHC RBC AUTO-ENTMCNC: 33.4 G/DL (ref 31–36)
MCV RBC AUTO: 100.1 FL (ref 80–100)
METHADONE UR QL SCN>300 NG/ML: ABNORMAL
MONOCYTES # BLD: 0.4 K/UL (ref 0–1.3)
MONOCYTES NFR BLD: 4.6 %
NEUTROPHILS # BLD: 5.6 K/UL (ref 1.7–7.7)
NEUTROPHILS NFR BLD: 70.7 %
OPIATES UR QL SCN>300 NG/ML: ABNORMAL
OXYCODONE UR QL SCN: ABNORMAL
PCP UR QL SCN>25 NG/ML: ABNORMAL
PH UR STRIP: 6 [PH]
PLATELET # BLD AUTO: 406 K/UL (ref 135–450)
PMV BLD AUTO: 7.3 FL (ref 5–10.5)
POTASSIUM SERPL-SCNC: 3.8 MMOL/L (ref 3.5–5.1)
PROT SERPL-MCNC: 7.3 G/DL (ref 6.4–8.2)
RBC # BLD AUTO: 3.89 M/UL (ref 4–5.2)
SALICYLATES SERPL-MCNC: <0.3 MG/DL (ref 15–30)
SARS-COV-2 RNA RESP QL NAA+PROBE: NOT DETECTED
SODIUM SERPL-SCNC: 138 MMOL/L (ref 136–145)
TROPONIN, HIGH SENSITIVITY: <6 NG/L (ref 0–14)
TROPONIN, HIGH SENSITIVITY: <6 NG/L (ref 0–14)
WBC # BLD AUTO: 7.9 K/UL (ref 4–11)

## 2023-10-25 PROCEDURE — 6360000002 HC RX W HCPCS: Performed by: INTERNAL MEDICINE

## 2023-10-25 PROCEDURE — 80179 DRUG ASSAY SALICYLATE: CPT

## 2023-10-25 PROCEDURE — 80143 DRUG ASSAY ACETAMINOPHEN: CPT

## 2023-10-25 PROCEDURE — 93005 ELECTROCARDIOGRAM TRACING: CPT | Performed by: PHYSICIAN ASSISTANT

## 2023-10-25 PROCEDURE — 6370000000 HC RX 637 (ALT 250 FOR IP): Performed by: PHYSICIAN ASSISTANT

## 2023-10-25 PROCEDURE — 90471 IMMUNIZATION ADMIN: CPT | Performed by: INTERNAL MEDICINE

## 2023-10-25 PROCEDURE — 1240000000 HC EMOTIONAL WELLNESS R&B

## 2023-10-25 PROCEDURE — 82077 ASSAY SPEC XCP UR&BREATH IA: CPT

## 2023-10-25 PROCEDURE — 90714 TD VACC NO PRESV 7 YRS+ IM: CPT | Performed by: INTERNAL MEDICINE

## 2023-10-25 PROCEDURE — 80053 COMPREHEN METABOLIC PANEL: CPT

## 2023-10-25 PROCEDURE — 80307 DRUG TEST PRSMV CHEM ANLYZR: CPT

## 2023-10-25 PROCEDURE — 96374 THER/PROPH/DIAG INJ IV PUSH: CPT

## 2023-10-25 PROCEDURE — 96375 TX/PRO/DX INJ NEW DRUG ADDON: CPT

## 2023-10-25 PROCEDURE — 83690 ASSAY OF LIPASE: CPT

## 2023-10-25 PROCEDURE — 85025 COMPLETE CBC W/AUTO DIFF WBC: CPT

## 2023-10-25 PROCEDURE — 99285 EMERGENCY DEPT VISIT HI MDM: CPT

## 2023-10-25 PROCEDURE — 87636 SARSCOV2 & INF A&B AMP PRB: CPT

## 2023-10-25 PROCEDURE — 6360000002 HC RX W HCPCS: Performed by: PHYSICIAN ASSISTANT

## 2023-10-25 PROCEDURE — 84484 ASSAY OF TROPONIN QUANT: CPT

## 2023-10-25 RX ORDER — LORAZEPAM 2 MG/ML
1 INJECTION INTRAMUSCULAR ONCE
Status: COMPLETED | OUTPATIENT
Start: 2023-10-25 | End: 2023-10-25

## 2023-10-25 RX ORDER — ACETAMINOPHEN 500 MG
1000 TABLET ORAL ONCE
Status: COMPLETED | OUTPATIENT
Start: 2023-10-25 | End: 2023-10-25

## 2023-10-25 RX ORDER — ONDANSETRON 2 MG/ML
4 INJECTION INTRAMUSCULAR; INTRAVENOUS ONCE
Status: COMPLETED | OUTPATIENT
Start: 2023-10-25 | End: 2023-10-25

## 2023-10-25 RX ADMIN — ONDANSETRON 4 MG: 2 INJECTION INTRAMUSCULAR; INTRAVENOUS at 16:10

## 2023-10-25 RX ADMIN — CLOSTRIDIUM TETANI TOXOID ANTIGEN (FORMALDEHYDE INACTIVATED) AND CORYNEBACTERIUM DIPHTHERIAE TOXOID ANTIGEN (FORMALDEHYDE INACTIVATED) 0.5 ML: 5; 2 INJECTION, SUSPENSION INTRAMUSCULAR at 17:42

## 2023-10-25 RX ADMIN — ACETAMINOPHEN 1000 MG: 500 TABLET ORAL at 20:52

## 2023-10-25 RX ADMIN — LORAZEPAM 1 MG: 2 INJECTION INTRAMUSCULAR; INTRAVENOUS at 16:11

## 2023-10-25 ASSESSMENT — ENCOUNTER SYMPTOMS
COLOR CHANGE: 0
SHORTNESS OF BREATH: 0
CONSTIPATION: 0
DIARRHEA: 0
NAUSEA: 0
ABDOMINAL PAIN: 0
RESPIRATORY NEGATIVE: 1
CHEST TIGHTNESS: 0
BACK PAIN: 0
PHOTOPHOBIA: 0
COUGH: 0
VOMITING: 0

## 2023-10-25 ASSESSMENT — PAIN DESCRIPTION - LOCATION
LOCATION: HEAD
LOCATION: HEAD

## 2023-10-25 ASSESSMENT — PAIN SCALES - GENERAL
PAINLEVEL_OUTOF10: 8
PAINLEVEL_OUTOF10: 5
PAINLEVEL_OUTOF10: 6

## 2023-10-25 ASSESSMENT — PAIN DESCRIPTION - DESCRIPTORS: DESCRIPTORS: ACHING

## 2023-10-25 NOTE — ED NOTES
Provided resources for mental health treatment services. Patient does have a psychiatrist and receives medication  at the [address] [place] [address]. Provided additional resources for therapist in patient area.       Rock Mays  10/25/23 2971
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
## 2023-10-25 NOTE — PROGRESS NOTES
10/25/2023     2:46 PM   C-SSRS Suicide Screening   1) Within the past month, have you wished you were dead or wished you could go to sleep and not wake up? Yes   2) Have you actually had any thoughts of killing yourself? Yes   3) Have you been thinking about how you might kill yourself? Yes   4) Have you had these thoughts and had some intention of acting on them? Yes   5) Have you started to work out or worked out the details of how to kill yourself? Do you intend to carry out this plan? Yes   6) Have you ever done anything, started to do anything, or prepared to do anything to end your life? Yes   Did this occur within the past 3 months? Yes     Pt placed in a gown with strings removed, all belongings removed, inventoried and given to Ok melendez. Room is safe for patient. Call light given to patient . Door remains open for safety of patient and . Patient placed in bed 24 by EMS. Patient room has been modified to remove extraneous ligature risks prior to patient arrival. Patient oriented to room and instructed about the schedule of the day including: vital sign frequency, lab draws, possible tests, frequency of MD and staff rounds, daily weights, I &O's and prescribed diet. Constant Observer at bedside, patient aware of placement and reason. For safety, patient is placed in a hospital gown without ties. A witnessed search of patient belongings was performed by Guillermo Ku RN and Hydroc[employer]. Patient belongings were secured and placed with security.

## 2023-10-25 NOTE — PROGRESS NOTES
Hourly rounding complete at this time. Pt resting comfortably in bed without any complaints at this time. Updated on test results and will continue to remain under observation in the ER by this RN.

## 2023-10-26 ENCOUNTER — HOSPITAL ENCOUNTER (INPATIENT)
Age: 58
LOS: 6 days | Discharge: HOME | DRG: 751 | End: 2023-11-01
Attending: PSYCHIATRY & NEUROLOGY | Admitting: PSYCHIATRY & NEUROLOGY
Payer: MEDICAID

## 2023-10-26 VITALS
RESPIRATION RATE: 18 BRPM | TEMPERATURE: 98.4 F | HEIGHT: 64 IN | DIASTOLIC BLOOD PRESSURE: 91 MMHG | HEART RATE: 78 BPM | WEIGHT: 159.21 LBS | BODY MASS INDEX: 27.18 KG/M2 | OXYGEN SATURATION: 97 % | SYSTOLIC BLOOD PRESSURE: 140 MMHG

## 2023-10-26 PROBLEM — F33.9 MAJOR DEPRESSION, RECURRENT (HCC): Status: ACTIVE | Noted: 2023-10-26

## 2023-10-26 LAB
EKG ATRIAL RATE: 60 BPM
EKG DIAGNOSIS: NORMAL
EKG P AXIS: 119 DEGREES
EKG P-R INTERVAL: 134 MS
EKG Q-T INTERVAL: 436 MS
EKG QRS DURATION: 82 MS
EKG QTC CALCULATION (BAZETT): 436 MS
EKG R AXIS: 127 DEGREES
EKG T AXIS: 115 DEGREES
EKG VENTRICULAR RATE: 60 BPM
TSH SERPL DL<=0.005 MIU/L-ACNC: 1.15 UIU/ML (ref 0.27–4.2)

## 2023-10-26 PROCEDURE — 6370000000 HC RX 637 (ALT 250 FOR IP): Performed by: PSYCHIATRY & NEUROLOGY

## 2023-10-26 PROCEDURE — 36415 COLL VENOUS BLD VENIPUNCTURE: CPT

## 2023-10-26 PROCEDURE — 1240000000 HC EMOTIONAL WELLNESS R&B

## 2023-10-26 PROCEDURE — 83036 HEMOGLOBIN GLYCOSYLATED A1C: CPT

## 2023-10-26 PROCEDURE — 93010 ELECTROCARDIOGRAM REPORT: CPT | Performed by: INTERNAL MEDICINE

## 2023-10-26 PROCEDURE — 99223 1ST HOSP IP/OBS HIGH 75: CPT | Performed by: PSYCHIATRY & NEUROLOGY

## 2023-10-26 PROCEDURE — 6370000000 HC RX 637 (ALT 250 FOR IP): Performed by: NURSE PRACTITIONER

## 2023-10-26 PROCEDURE — 93005 ELECTROCARDIOGRAM TRACING: CPT | Performed by: PSYCHIATRY & NEUROLOGY

## 2023-10-26 PROCEDURE — 84443 ASSAY THYROID STIM HORMONE: CPT

## 2023-10-26 RX ORDER — POLYETHYLENE GLYCOL 3350 17 G
2 POWDER IN PACKET (EA) ORAL
Status: DISCONTINUED | OUTPATIENT
Start: 2023-10-26 | End: 2023-10-26 | Stop reason: HOSPADM

## 2023-10-26 RX ORDER — ERGOCALCIFEROL 1.25 MG/1
50000 CAPSULE ORAL WEEKLY
Status: ON HOLD | COMMUNITY

## 2023-10-26 RX ORDER — LISINOPRIL 5 MG/1
5 TABLET ORAL DAILY
Status: DISCONTINUED | OUTPATIENT
Start: 2023-10-27 | End: 2023-11-01 | Stop reason: HOSPADM

## 2023-10-26 RX ORDER — LANOLIN ALCOHOL/MO/W.PET/CERES
1000 CREAM (GRAM) TOPICAL DAILY
Status: ON HOLD | COMMUNITY

## 2023-10-26 RX ORDER — DULOXETIN HYDROCHLORIDE 60 MG/1
60 CAPSULE, DELAYED RELEASE ORAL DAILY
Status: DISCONTINUED | OUTPATIENT
Start: 2023-10-26 | End: 2023-10-27

## 2023-10-26 RX ORDER — IBUPROFEN 400 MG/1
400 TABLET ORAL EVERY 6 HOURS PRN
Status: DISCONTINUED | OUTPATIENT
Start: 2023-10-26 | End: 2023-11-01 | Stop reason: HOSPADM

## 2023-10-26 RX ORDER — ERGOCALCIFEROL 1.25 MG/1
50000 CAPSULE ORAL WEEKLY
Status: DISCONTINUED | OUTPATIENT
Start: 2023-10-26 | End: 2023-11-01 | Stop reason: HOSPADM

## 2023-10-26 RX ORDER — MAGNESIUM HYDROXIDE/ALUMINUM HYDROXICE/SIMETHICONE 120; 1200; 1200 MG/30ML; MG/30ML; MG/30ML
30 SUSPENSION ORAL EVERY 6 HOURS PRN
Status: DISCONTINUED | OUTPATIENT
Start: 2023-10-26 | End: 2023-10-26 | Stop reason: HOSPADM

## 2023-10-26 RX ORDER — BENZTROPINE MESYLATE 1 MG/ML
2 INJECTION INTRAMUSCULAR; INTRAVENOUS 2 TIMES DAILY PRN
Status: DISCONTINUED | OUTPATIENT
Start: 2023-10-26 | End: 2023-11-01 | Stop reason: HOSPADM

## 2023-10-26 RX ORDER — CARVEDILOL 6.25 MG/1
6.25 TABLET ORAL 2 TIMES DAILY WITH MEALS
Status: DISCONTINUED | OUTPATIENT
Start: 2023-10-26 | End: 2023-11-01 | Stop reason: HOSPADM

## 2023-10-26 RX ORDER — GABAPENTIN 400 MG/1
400 CAPSULE ORAL 2 TIMES DAILY
Status: DISCONTINUED | OUTPATIENT
Start: 2023-10-26 | End: 2023-11-01 | Stop reason: HOSPADM

## 2023-10-26 RX ORDER — LISINOPRIL 5 MG/1
5 TABLET ORAL DAILY
Status: DISCONTINUED | OUTPATIENT
Start: 2023-10-26 | End: 2023-10-26 | Stop reason: HOSPADM

## 2023-10-26 RX ORDER — OLANZAPINE 10 MG/1
10 TABLET ORAL EVERY 4 HOURS PRN
Status: DISCONTINUED | OUTPATIENT
Start: 2023-10-26 | End: 2023-10-26 | Stop reason: HOSPADM

## 2023-10-26 RX ORDER — IBUPROFEN 400 MG/1
400 TABLET ORAL EVERY 6 HOURS PRN
Status: DISCONTINUED | OUTPATIENT
Start: 2023-10-26 | End: 2023-10-26 | Stop reason: HOSPADM

## 2023-10-26 RX ORDER — GABAPENTIN 400 MG/1
400 CAPSULE ORAL 2 TIMES DAILY
Status: ON HOLD | COMMUNITY

## 2023-10-26 RX ORDER — BENZTROPINE MESYLATE 1 MG/ML
2 INJECTION INTRAMUSCULAR; INTRAVENOUS 2 TIMES DAILY PRN
Status: DISCONTINUED | OUTPATIENT
Start: 2023-10-26 | End: 2023-10-26 | Stop reason: HOSPADM

## 2023-10-26 RX ORDER — NICOTINE 21 MG/24HR
1 PATCH, TRANSDERMAL 24 HOURS TRANSDERMAL DAILY
Status: DISCONTINUED | OUTPATIENT
Start: 2023-10-26 | End: 2023-11-01 | Stop reason: HOSPADM

## 2023-10-26 RX ORDER — ATORVASTATIN CALCIUM 40 MG/1
80 TABLET, FILM COATED ORAL NIGHTLY
Status: DISCONTINUED | OUTPATIENT
Start: 2023-10-26 | End: 2023-10-26 | Stop reason: HOSPADM

## 2023-10-26 RX ORDER — NICOTINE 21 MG/24HR
1 PATCH, TRANSDERMAL 24 HOURS TRANSDERMAL DAILY
Status: DISCONTINUED | OUTPATIENT
Start: 2023-10-26 | End: 2023-10-26 | Stop reason: HOSPADM

## 2023-10-26 RX ORDER — CHOLECALCIFEROL (VITAMIN D3) 125 MCG
1000 CAPSULE ORAL DAILY
Status: DISCONTINUED | OUTPATIENT
Start: 2023-10-26 | End: 2023-10-26 | Stop reason: HOSPADM

## 2023-10-26 RX ORDER — QUETIAPINE FUMARATE 25 MG/1
50 TABLET, FILM COATED ORAL ONCE
Status: COMPLETED | OUTPATIENT
Start: 2023-10-26 | End: 2023-10-26

## 2023-10-26 RX ORDER — ACETAMINOPHEN 325 MG/1
650 TABLET ORAL EVERY 4 HOURS PRN
Status: DISCONTINUED | OUTPATIENT
Start: 2023-10-26 | End: 2023-10-26 | Stop reason: HOSPADM

## 2023-10-26 RX ORDER — CLOPIDOGREL BISULFATE 75 MG/1
75 TABLET ORAL DAILY
Status: DISCONTINUED | OUTPATIENT
Start: 2023-10-26 | End: 2023-10-26 | Stop reason: HOSPADM

## 2023-10-26 RX ORDER — ACETAMINOPHEN 325 MG/1
650 TABLET ORAL EVERY 4 HOURS PRN
Status: DISCONTINUED | OUTPATIENT
Start: 2023-10-26 | End: 2023-11-01 | Stop reason: HOSPADM

## 2023-10-26 RX ORDER — ATORVASTATIN CALCIUM 40 MG/1
80 TABLET, FILM COATED ORAL NIGHTLY
Status: DISCONTINUED | OUTPATIENT
Start: 2023-10-26 | End: 2023-11-01 | Stop reason: HOSPADM

## 2023-10-26 RX ORDER — ASPIRIN 81 MG/1
81 TABLET, CHEWABLE ORAL DAILY
Status: DISCONTINUED | OUTPATIENT
Start: 2023-10-27 | End: 2023-11-01 | Stop reason: HOSPADM

## 2023-10-26 RX ORDER — DULOXETIN HYDROCHLORIDE 60 MG/1
60 CAPSULE, DELAYED RELEASE ORAL DAILY
Status: DISCONTINUED | OUTPATIENT
Start: 2023-10-26 | End: 2023-10-26 | Stop reason: HOSPADM

## 2023-10-26 RX ORDER — CARVEDILOL 6.25 MG/1
6.25 TABLET ORAL 2 TIMES DAILY WITH MEALS
Status: DISCONTINUED | OUTPATIENT
Start: 2023-10-26 | End: 2023-10-26 | Stop reason: HOSPADM

## 2023-10-26 RX ORDER — ASPIRIN 81 MG/1
81 TABLET, CHEWABLE ORAL DAILY
Status: DISCONTINUED | OUTPATIENT
Start: 2023-10-26 | End: 2023-10-26 | Stop reason: HOSPADM

## 2023-10-26 RX ORDER — CLOPIDOGREL BISULFATE 75 MG/1
75 TABLET ORAL DAILY
Status: DISCONTINUED | OUTPATIENT
Start: 2023-10-27 | End: 2023-11-01 | Stop reason: HOSPADM

## 2023-10-26 RX ORDER — CHOLECALCIFEROL (VITAMIN D3) 125 MCG
1000 CAPSULE ORAL DAILY
Status: DISCONTINUED | OUTPATIENT
Start: 2023-10-27 | End: 2023-11-01 | Stop reason: HOSPADM

## 2023-10-26 RX ORDER — MAGNESIUM HYDROXIDE/ALUMINUM HYDROXICE/SIMETHICONE 120; 1200; 1200 MG/30ML; MG/30ML; MG/30ML
30 SUSPENSION ORAL EVERY 6 HOURS PRN
Status: DISCONTINUED | OUTPATIENT
Start: 2023-10-26 | End: 2023-11-01 | Stop reason: HOSPADM

## 2023-10-26 RX ORDER — OLANZAPINE 10 MG/1
10 TABLET ORAL EVERY 4 HOURS PRN
Status: DISCONTINUED | OUTPATIENT
Start: 2023-10-26 | End: 2023-11-01 | Stop reason: HOSPADM

## 2023-10-26 RX ORDER — POLYETHYLENE GLYCOL 3350 17 G
2 POWDER IN PACKET (EA) ORAL
Status: DISCONTINUED | OUTPATIENT
Start: 2023-10-26 | End: 2023-11-01 | Stop reason: HOSPADM

## 2023-10-26 RX ADMIN — QUETIAPINE FUMARATE 50 MG: 25 TABLET ORAL at 10:06

## 2023-10-26 RX ADMIN — CARVEDILOL 6.25 MG: 6.25 TABLET, FILM COATED ORAL at 17:51

## 2023-10-26 RX ADMIN — CYANOCOBALAMIN TAB 500 MCG 1000 MCG: 500 TAB at 15:23

## 2023-10-26 RX ADMIN — CLOPIDOGREL BISULFATE 75 MG: 75 TABLET ORAL at 10:58

## 2023-10-26 RX ADMIN — CARVEDILOL 6.25 MG: 6.25 TABLET, FILM COATED ORAL at 10:59

## 2023-10-26 RX ADMIN — DULOXETINE HYDROCHLORIDE 60 MG: 60 CAPSULE, DELAYED RELEASE ORAL at 15:23

## 2023-10-26 RX ADMIN — ATORVASTATIN CALCIUM 80 MG: 40 TABLET, FILM COATED ORAL at 20:41

## 2023-10-26 RX ADMIN — GABAPENTIN 400 MG: 400 CAPSULE ORAL at 20:42

## 2023-10-26 RX ADMIN — ASPIRIN 81 MG: 81 TABLET, CHEWABLE ORAL at 10:59

## 2023-10-26 RX ADMIN — LISINOPRIL 5 MG: 5 TABLET ORAL at 10:58

## 2023-10-26 ASSESSMENT — PATIENT HEALTH QUESTIONNAIRE - PHQ9
8. MOVING OR SPEAKING SO SLOWLY THAT OTHER PEOPLE COULD HAVE NOTICED. OR THE OPPOSITE, BEING SO FIGETY OR RESTLESS THAT YOU HAVE BEEN MOVING AROUND A LOT MORE THAN USUAL: 1
SUM OF ALL RESPONSES TO PHQ9 QUESTIONS 1 & 2: 5
SUM OF ALL RESPONSES TO PHQ QUESTIONS 1-9: 17
1. LITTLE INTEREST OR PLEASURE IN DOING THINGS: 2
7. TROUBLE CONCENTRATING ON THINGS, SUCH AS READING THE NEWSPAPER OR WATCHING TELEVISION: 1
SUM OF ALL RESPONSES TO PHQ QUESTIONS 1-9: 14
2. FEELING DOWN, DEPRESSED OR HOPELESS: 3
10. IF YOU CHECKED OFF ANY PROBLEMS, HOW DIFFICULT HAVE THESE PROBLEMS MADE IT FOR YOU TO DO YOUR WORK, TAKE CARE OF THINGS AT HOME, OR GET ALONG WITH OTHER PEOPLE: 3
7. TROUBLE CONCENTRATING ON THINGS, SUCH AS READING THE NEWSPAPER OR WATCHING TELEVISION: 0
6. FEELING BAD ABOUT YOURSELF - OR THAT YOU ARE A FAILURE OR HAVE LET YOURSELF OR YOUR FAMILY DOWN: 3
5. POOR APPETITE OR OVEREATING: 2
3. TROUBLE FALLING OR STAYING ASLEEP: 1
10. IF YOU CHECKED OFF ANY PROBLEMS, HOW DIFFICULT HAVE THESE PROBLEMS MADE IT FOR YOU TO DO YOUR WORK, TAKE CARE OF THINGS AT HOME, OR GET ALONG WITH OTHER PEOPLE: 3
5. POOR APPETITE OR OVEREATING: 3
1. LITTLE INTEREST OR PLEASURE IN DOING THINGS: 2
2. FEELING DOWN, DEPRESSED OR HOPELESS: 3
SUM OF ALL RESPONSES TO PHQ QUESTIONS 1-9: 17
1. LITTLE INTEREST OR PLEASURE IN DOING THINGS: 2
SUM OF ALL RESPONSES TO PHQ QUESTIONS 1-9: 14
3. TROUBLE FALLING OR STAYING ASLEEP: 1
SUM OF ALL RESPONSES TO PHQ QUESTIONS 1-9: 17
7. TROUBLE CONCENTRATING ON THINGS, SUCH AS READING THE NEWSPAPER OR WATCHING TELEVISION: 0
SUM OF ALL RESPONSES TO PHQ9 QUESTIONS 1 & 2: 5
9. THOUGHTS THAT YOU WOULD BE BETTER OFF DEAD, OR OF HURTING YOURSELF: 3
3. TROUBLE FALLING OR STAYING ASLEEP: 0
10. IF YOU CHECKED OFF ANY PROBLEMS, HOW DIFFICULT HAVE THESE PROBLEMS MADE IT FOR YOU TO DO YOUR WORK, TAKE CARE OF THINGS AT HOME, OR GET ALONG WITH OTHER PEOPLE: 1
SUM OF ALL RESPONSES TO PHQ QUESTIONS 1-9: 17
SUM OF ALL RESPONSES TO PHQ QUESTIONS 1-9: 17
5. POOR APPETITE OR OVEREATING: 3
6. FEELING BAD ABOUT YOURSELF - OR THAT YOU ARE A FAILURE OR HAVE LET YOURSELF OR YOUR FAMILY DOWN: 3
SUM OF ALL RESPONSES TO PHQ QUESTIONS 1-9: 14
SUM OF ALL RESPONSES TO PHQ QUESTIONS 1-9: 14
2. FEELING DOWN, DEPRESSED OR HOPELESS: 3
SUM OF ALL RESPONSES TO PHQ QUESTIONS 1-9: 14
4. FEELING TIRED OR HAVING LITTLE ENERGY: 1
4. FEELING TIRED OR HAVING LITTLE ENERGY: 0
8. MOVING OR SPEAKING SO SLOWLY THAT OTHER PEOPLE COULD HAVE NOTICED. OR THE OPPOSITE, BEING SO FIGETY OR RESTLESS THAT YOU HAVE BEEN MOVING AROUND A LOT MORE THAN USUAL: 1
9. THOUGHTS THAT YOU WOULD BE BETTER OFF DEAD, OR OF HURTING YOURSELF: 2
SUM OF ALL RESPONSES TO PHQ9 QUESTIONS 1 & 2: 5
8. MOVING OR SPEAKING SO SLOWLY THAT OTHER PEOPLE COULD HAVE NOTICED. OR THE OPPOSITE, BEING SO FIGETY OR RESTLESS THAT YOU HAVE BEEN MOVING AROUND A LOT MORE THAN USUAL: 1
SUM OF ALL RESPONSES TO PHQ QUESTIONS 1-9: 17
9. THOUGHTS THAT YOU WOULD BE BETTER OFF DEAD, OR OF HURTING YOURSELF: 3
4. FEELING TIRED OR HAVING LITTLE ENERGY: 1
SUM OF ALL RESPONSES TO PHQ QUESTIONS 1-9: 12
6. FEELING BAD ABOUT YOURSELF - OR THAT YOU ARE A FAILURE OR HAVE LET YOURSELF OR YOUR FAMILY DOWN: 3

## 2023-10-26 ASSESSMENT — SLEEP AND FATIGUE QUESTIONNAIRES
DO YOU USE A SLEEP AID: NO
AVERAGE NUMBER OF SLEEP HOURS: 4
AVERAGE NUMBER OF SLEEP HOURS: 4
DO YOU HAVE DIFFICULTY SLEEPING: NO
AVERAGE NUMBER OF SLEEP HOURS: 4
SLEEP PATTERN: DIFFICULTY FALLING ASLEEP;DISTURBED/INTERRUPTED SLEEP
DO YOU USE A SLEEP AID: NO
DO YOU USE A SLEEP AID: NO
SLEEP PATTERN: DIFFICULTY FALLING ASLEEP;DISTURBED/INTERRUPTED SLEEP
DO YOU HAVE DIFFICULTY SLEEPING: YES
DO YOU HAVE DIFFICULTY SLEEPING: YES

## 2023-10-26 ASSESSMENT — LIFESTYLE VARIABLES
HOW MANY STANDARD DRINKS CONTAINING ALCOHOL DO YOU HAVE ON A TYPICAL DAY: 1 OR 2
HOW MANY STANDARD DRINKS CONTAINING ALCOHOL DO YOU HAVE ON A TYPICAL DAY: 1 OR 2
HOW OFTEN DO YOU HAVE A DRINK CONTAINING ALCOHOL: MONTHLY OR LESS
HOW OFTEN DO YOU HAVE A DRINK CONTAINING ALCOHOL: MONTHLY OR LESS

## 2023-10-26 ASSESSMENT — PAIN SCALES - GENERAL: PAINLEVEL_OUTOF10: 0

## 2023-10-26 NOTE — PLAN OF CARE
[facility]  Initial Interdisciplinary Treatment Plan NOTE    Review Date & Time: 10/26/23 0915    Patient was not in treatment team    Admission Type:   Admission Type: Voluntary    Reason for admission:         Estimated Length of Stay Update:  3-5 days  Estimated Discharge Date Update: 10/29/23-11/1/23    EDUCATION:   Learner Progress Toward Treatment Goals: Reviewed results and recommendations of this team    Method: Individual    Outcome: Verbalized understanding    PATIENT GOALS: none expressed    PLAN/TREATMENT RECOMMENDATIONS UPDATE:evaluate and treat    GOALS UPDATE:   Time frame for Short-Term Goals: 2 days    Ela Leblanc RN

## 2023-10-26 NOTE — ED NOTES
Report given to CMT at this time, all questions answered during handoff report, VSS prior to transport to [facility].      Herbie Hughes RN  10/25/23 3629

## 2023-10-26 NOTE — PROGRESS NOTES
Hourly rounding complete at this time. Pt resting comfortably in bed without any complaints at this time. Will continue to remain under observation in the ER by this RN.

## 2023-10-26 NOTE — PROGRESS NOTES
Behavioral Services  Medicare Certification Upon Admission    I certify that this patient's inpatient psychiatric hospital admission is medically necessary for:    [x] (1) Treatment which could reasonably be expected to improve this patient's condition,       [x] (2) Or for diagnostic study;     AND     [x](2) The inpatient psychiatric services are provided while the individual is under the care of a physician and are included in the individualized plan of care.     Estimated length of stay/service 5 d    Plan for post-hospital care outpt    Electronically signed by Elina Boyd MD on 10/26/2023 at 2:02 PM

## 2023-10-26 NOTE — PROGRESS NOTES
Patient arrived to unit via wheelchair accompanied by two staff members. She was shown to assigned room. Vitals obtained. Patient calm and cooperative at this time.

## 2023-10-26 NOTE — PROGRESS NOTES
Group Therapy Note    Date: 10/26/2023  Start Time: 1000  End Time:  1100  Number of Participants: 4    Type of Group: Music Group    Notes:  Pt present for Music Group. Pt actively participated by making song selections and singing along to music. Participation Level:  Active Listener and Interactive    Participation Quality: Appropriate and Attentive      Speech:  normal      Affective Functioning: Congruent      Endings: None Reported    Modes of Intervention: Support, Socialization, Activity, and Media      Discipline Responsible: Chaplain Camilo Mcfadden       10/26/23 8785   Encounter Summary   Encounter Overview/Reason  Behavioral Health   Service Provided For: Patient   Last Encounter    (10/26 Music Group)   Complexity of Encounter Moderate   Begin Time 1000   End Time  1100   Total Time Calculated 60 min   Behavioral Health    Type  Spirituality Group

## 2023-10-26 NOTE — PROGRESS NOTES
4 Eyes Skin Assessment     The patient is being assessed for  Admission    I agree that 2 RN's have performed a thorough Head to Toe Skin Assessment on the patient. ALL assessment sites listed below have been assessed. Areas assessed for pressure by both nurses: 2  [x]   Head, Face, and Ears   [x]   Shoulders, Back, and Chest  [x]   Arms, Elbows, and Hands   [x]   Coccyx, Sacrum, and Ischum  [x]   Legs, Feet, and Heels                                Skin Assessed Under all Medical Devices by both nurses:  No devices present               All Mepilex Borders were peeled back and area peeked at by both nurses:  No: None present  Please list where Mepilex Borders are located:  0                 Does the Patient have Skin Breakdown related to pressure?   No          Yo Prevention initiated:  NA   Wound Care Orders initiated:  NA      [facility] nurse consulted for Pressure Injury (Stage 3,4, Unstageable, DTI, NWPT, Complex wounds)and New or Established Ostomies:  NA        Nurse 1 eSignature: Electronically signed by Nori Serrato RN on 10/26/23 at 12:19 AM EDT    **SHARE this note so that the co-signing nurse is able to place an eSignature**    Nurse 2 eSignature: Electronically signed by Lisa Tejada RN on 10/26/23 at 2:39 AM EDT

## 2023-10-26 NOTE — PROGRESS NOTES
CSSR-S Assessment completed with patient who then scored HIGH RISK. Provider Dr. Isaac Nogueira was notified of HIGH RISK score. Is the patient currently expressing suicidal ideation: Yes    Is the patient willing to notify staff of suicidal ideation: Yes, but expresses that notification of staff may not happen in every circumstance.      Completed by: Roselyn Joseph RN

## 2023-10-26 NOTE — ED NOTES
Alberto Marlow RN updated on VS at [facility], [address] awaiting transport to [employer].      Que Weiner RN  10/25/23 2181

## 2023-10-26 NOTE — PROGRESS NOTES
[facility]  Admission Note     Admission Type:   Admission Type: Voluntary    Reason for admission:  Reason for Admission: Sucidal Ideation      Addictive Behavior:   Addictive Behavior  In the Past 3 Months, Have You Felt or Has Someone Told You That You Have a Problem With  : None    Medical Problems:   Past Medical History:   Diagnosis Date    Alcohol abuse     Alcohol use disorder, moderate, dependence (HCC)     Cannabis use disorder, mild, abuse     Chronic hepatitis C virus infection (HCC)     Cluster B personality disorder (HCC)     Depression     Diverticulosis     DOMINICK (generalized anxiety disorder)     GERD (gastroesophageal reflux disease)     Kidney stone     PTSD (post-traumatic stress disorder)     Tobacco abuse        Status EXAM:  Mental Status and Behavioral Exam  Normal: No  Level of Assistance: Independent/Self  Facial Expression: Sad, Worried  Affect: Congruent  Level of Consciousness: Alert  Frequency of Checks: 4 times per hour, close  Mood:Normal: No  Mood: Anxious, Sad  Motor Activity:Normal: Yes  Motor Activity: Other (comment) (WNL)  Eye Contact: Good  Observed Behavior: Cooperative, Friendly  Sexual Misconduct History: Current - no  Preception: [place] to person, [place] to time, [place] to place, [place] to situation  Attention:Normal: Yes  Attention: Others (comment) (WNL)  Thought Processes: Unremarkable  Thought Content:Normal: Yes  Thought Content: Other (comment), Preoccupations (Pt was preoccupied with the anniversary of the loss of her parents which is coming up and with breaking up with her boyfriend)  Depression Symptoms: Appetite change, Change in energy level, Feelings of helplessness, Feelings of hopelessess, Feelings of worthlessness, Sleep disturbance  Anxiety Symptoms: Generalized  [place] Symptoms: No problems reported or observed.   Hallucinations: None  Delusions: No  Memory:Normal: Yes  Insight and Judgment: No  Insight and Judgment: Poor judgment, Poor
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
## 2023-10-26 NOTE — PLAN OF CARE
4 Eyes Skin Assessment     The patient is being assessed for  Admission    I agree that 2 RN's have performed a thorough Head to Toe Skin Assessment on the patient. ALL assessment sites listed below have been assessed.        Areas assessed for pressure by both nurses: yes  []   Head, Face, and Ears   []   Shoulders, Back, and Chest  []   Arms, Elbows, and Hands   []   Coccyx, Sacrum, and Ischum  []   Legs, Feet, and Heels                                Skin Assessed Under all Medical Devices by both nurses:  N/a               All Mepilex Borders were peeled back and area peeked at by both nurses:  No: n/a  Please list where Mepilex Borders are located:  n/a                 Does the Patient have Skin Breakdown related to pressure?   n/a      (Insert Photo here n/a)         Yo Prevention initiated:  NA   Wound Care Orders initiated:  NA      [facility] nurse consulted for Pressure Injury (Stage 3,4, Unstageable, DTI, NWPT, Complex wounds)and New or Established Ostomies:  NA        Nurse 1 eSignature: Electronically signed by Regino Gan RN on 10/26/23 at 5:46 PM EDT    **SHARE this note so that the co-signing nurse is able to place an eSignature**    Nurse 2 eSignature: Electronically signed by Noris Butts RN on 10/26/23 at 5:47 PM EDT
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
## 2023-10-26 NOTE — ED NOTES
Report given to [facility] RN at [facility], all questions answered during handoff report.      Taran Umana, SADAF  10/25/23 9247

## 2023-10-26 NOTE — PROGRESS NOTES
[facility]  Admission Note     Admission Type:   Admission Type: Voluntary    Reason for admission:   self harm cutting self          Addictive Behavior:   Addictive Behavior  In the Past 3 Months, Have You Felt or Has Someone Told You That You Have a Problem With  : None    Medical Problems:   Past Medical History:   Diagnosis Date    Alcohol abuse     Alcohol use disorder, moderate, dependence (HCC)     Cannabis use disorder, mild, abuse     Chronic hepatitis C virus infection (HCC)     Cluster B personality disorder (HCC)     Depression     Diverticulosis     DOMINICK (generalized anxiety disorder)     GERD (gastroesophageal reflux disease)     Kidney stone     PTSD (post-traumatic stress disorder)     Tobacco abuse        Status EXAM:  Mental Status and Behavioral Exam  Normal: Yes  Level of Assistance: Independent/Self  Facial Expression: Sad, Worried  Affect: Blunt  Level of Consciousness: Alert  Frequency of Checks: 1 staff: 1 patient  - continuous  Mood:Normal: No  Mood: Sad, Worthless, low self-esteem, Helpless  Motor Activity:Normal: Yes  Eye Contact: Fair  Observed Behavior: Friendly  Sexual Misconduct History: Current - no  Preception: [place] to person, [place] to time, [place] to place, [place] to situation  Attention:Normal: Yes  Thought Processes: Other (comment) (reports feels sad)  Thought Content:Normal: No  Thought Content: Other (comment) (sad could hurt self)  Depression Symptoms: Feelings of helplessness, Feelings of worthlessness, Feelings of hopelessess, Crying  Anxiety Symptoms: Other (comment) (reports feels anxious)  [place] Symptoms: No problems reported or observed.   Hallucinations: None  Delusions: No  Memory:Normal: Yes  Insight and Judgment: No  Insight and Judgment: Poor insight, Poor judgment (self harm on admit)    Tobacco Screening:  Practical Counseling, on admission, jacobo X, if applicable and completed (first 3 are required if patient doesn't refuse)         ( )

## 2023-10-26 NOTE — PLAN OF CARE
Home Medication Reconciliation Status          [x] COMPLETE       Medication history has been reviewed and obtained from the following source(s):       [] patient/family verbal report             [] patient/family provided written list       [] external pharmacy   [] external facility list         [x]  Provider notified that home medication reconciliation is complete          [] IN PROGRESS       Medication reconciliation marked in progress at this time due to:       [] patient/family poor historian      [] waiting arrival of family to clarify       [] waiting for accurate list        [] external pharmacy needs called      * Follow up is needed. [] UNABLE TO ASSESS       Medication reconciliation is incomplete and unable to assess at this time due to:       [] critical patient condition   [] patient is unresponsive        [] no family available                       [] unknown pharmacy       [] anonymous patient          * Follow up is needed.       [] Pharmacy consult placed for medication reconciliation assistance   Additional comments:

## 2023-10-26 NOTE — PLAN OF CARE
Problem: Self Harm/Suicidality  Goal: Will have no self-injury during hospital stay  Description: INTERVENTIONS:  1. Ensure constant observer at bedside with Q15M safety checks  2. Maintain a safe environment  3. Secure patient belongings  4. Ensure family/visitors adhere to safety recommendations  5. Ensure safety tray has been added to patient's diet order  6. Every shift and PRN: Re-assess suicidal risk via Frequent Screener    10/26/2023 1205 by Meghan Hawley RN  Outcome: Progressing     Problem: Depression  Goal: Will be euthymic at discharge  Description: INTERVENTIONS:  1. Administer medication as ordered  2. Provide emotional support via 1:1 interaction with staff  3. Encourage involvement in milieu/groups/activities  4. Monitor for social isolation  Outcome: Progressing    Pt has been visible on the unit. She did talk to someone on the phone and stated, \"I just want to die. \" When asked assessment questions with this RN after the phone call, pt denies SI. She did verbalize that she will alert staff if she felt like harming herself on the unit. She is unsure, if she is discharged, if she would try to commit suicide. She has been medication compliant. One time dose of seroquel given with effective results for anxiety.

## 2023-10-26 NOTE — PROGRESS NOTES
CSSR-S Assessment completed with patient who then scored HIGH RISK. Provider Dr. Solange De Leon was notified of HIGH RISK score. Is the patient currently expressing suicidal ideation: NO    Is the patient willing to notify staff of suicidal ideation: YES    Pt denies current SI/HI/VH/AH and agrees to communicate with staff if no longer feel safe or in control. Provider notified, verbal order given to order and then discontinue suicide precautions and constant observation.       Completed by: Melia Jones

## 2023-10-26 NOTE — ED NOTES
Pt departed [address], [address] [place] [address] Psych updated on Pt transport departure, no questions asked from Toby Jo.      Nilda Luna, SADAF  10/25/23 7810

## 2023-10-26 NOTE — PROGRESS NOTES
Pt very anxious and states she feels like she is about to, \"lose it. \" Pt complaining of nausea.  Seroquel given

## 2023-10-26 NOTE — PLAN OF CARE
Problem: Pain  Goal: Verbalizes/displays adequate comfort level or baseline comfort level  Outcome: Progressing     Problem: Self Harm/Suicidality  Goal: Will have no self-injury during hospital stay  Description: INTERVENTIONS:  1. Ensure constant observer at bedside with Q15M safety checks  2. Maintain a safe environment  3. Secure patient belongings  4. Ensure family/visitors adhere to safety recommendations  5. Ensure safety tray has been added to patient's diet order  6. Every shift and PRN: Re-assess suicidal risk via Frequent Screener    Outcome: Progressing     Problem: Anxiety  Goal: Will report anxiety at manageable levels  Description: INTERVENTIONS:  1. Administer medication as ordered  2. Teach and rehearse alternative coping skills  3. Provide emotional support with 1:1 interaction with staff  Outcome: Progressing     Problem: Depression/Self Harm  Goal: Effect of psychiatric condition will be minimized and patient will be protected from self harm  Description: INTERVENTIONS:  1. Assess impact of patient's symptoms on level of functioning, self care needs and offer support as indicated  2. Assess patient/family knowledge of depression, impact on illness and need for teaching  3. Provide emotional support, presence and reassurance  4. Assess for possible suicidal thoughts or ideation. If patient expresses suicidal thoughts or statements do not leave alone, initiate Suicide Precautions, move to a room close to the nursing station and obtain sitter  5.  Initiate consults as appropriate with Mental Health Professional, Spiritual Care, Psychosocial CNS, and consider a recommendation to the LIP for a Psychiatric Consultation  Outcome: Progressing

## 2023-10-27 ENCOUNTER — FOLLOWUP TELEPHONE ENCOUNTER (OUTPATIENT)
Dept: PSYCHIATRY | Age: 58
End: 2023-10-27

## 2023-10-27 LAB
EKG ATRIAL RATE: 62 BPM
EKG DIAGNOSIS: NORMAL
EKG P AXIS: 31 DEGREES
EKG P-R INTERVAL: 140 MS
EKG Q-T INTERVAL: 416 MS
EKG QRS DURATION: 84 MS
EKG QTC CALCULATION (BAZETT): 422 MS
EKG R AXIS: 58 DEGREES
EKG T AXIS: 89 DEGREES
EKG VENTRICULAR RATE: 62 BPM

## 2023-10-27 PROCEDURE — 1240000000 HC EMOTIONAL WELLNESS R&B

## 2023-10-27 PROCEDURE — 6370000000 HC RX 637 (ALT 250 FOR IP): Performed by: PSYCHIATRY & NEUROLOGY

## 2023-10-27 PROCEDURE — 93010 ELECTROCARDIOGRAM REPORT: CPT | Performed by: INTERNAL MEDICINE

## 2023-10-27 PROCEDURE — 99233 SBSQ HOSP IP/OBS HIGH 50: CPT | Performed by: PSYCHIATRY & NEUROLOGY

## 2023-10-27 RX ORDER — DULOXETIN HYDROCHLORIDE 60 MG/1
60 CAPSULE, DELAYED RELEASE ORAL 2 TIMES DAILY
Status: DISCONTINUED | OUTPATIENT
Start: 2023-10-27 | End: 2023-10-30

## 2023-10-27 RX ADMIN — ASPIRIN 81 MG: 81 TABLET, CHEWABLE ORAL at 09:27

## 2023-10-27 RX ADMIN — GABAPENTIN 400 MG: 400 CAPSULE ORAL at 21:29

## 2023-10-27 RX ADMIN — CARVEDILOL 6.25 MG: 6.25 TABLET, FILM COATED ORAL at 09:26

## 2023-10-27 RX ADMIN — CLOPIDOGREL BISULFATE 75 MG: 75 TABLET ORAL at 09:26

## 2023-10-27 RX ADMIN — GABAPENTIN 400 MG: 400 CAPSULE ORAL at 09:26

## 2023-10-27 RX ADMIN — ATORVASTATIN CALCIUM 80 MG: 40 TABLET, FILM COATED ORAL at 21:29

## 2023-10-27 RX ADMIN — DULOXETINE HYDROCHLORIDE 60 MG: 60 CAPSULE, DELAYED RELEASE ORAL at 12:58

## 2023-10-27 RX ADMIN — DULOXETINE HYDROCHLORIDE 60 MG: 60 CAPSULE, DELAYED RELEASE ORAL at 09:27

## 2023-10-27 RX ADMIN — DULOXETINE HYDROCHLORIDE 60 MG: 60 CAPSULE, DELAYED RELEASE ORAL at 21:29

## 2023-10-27 RX ADMIN — ACETAMINOPHEN 650 MG: 325 TABLET ORAL at 01:41

## 2023-10-27 RX ADMIN — Medication 1000 MCG: at 09:25

## 2023-10-27 RX ADMIN — LISINOPRIL 5 MG: 5 TABLET ORAL at 09:26

## 2023-10-27 RX ADMIN — CARVEDILOL 6.25 MG: 6.25 TABLET, FILM COATED ORAL at 17:07

## 2023-10-27 ASSESSMENT — PAIN - FUNCTIONAL ASSESSMENT: PAIN_FUNCTIONAL_ASSESSMENT: ACTIVITIES ARE NOT PREVENTED

## 2023-10-27 ASSESSMENT — PAIN SCALES - GENERAL
PAINLEVEL_OUTOF10: 7
PAINLEVEL_OUTOF10: 0
PAINLEVEL_OUTOF10: 0

## 2023-10-27 ASSESSMENT — PAIN DESCRIPTION - ORIENTATION: ORIENTATION: LEFT

## 2023-10-27 ASSESSMENT — PAIN DESCRIPTION - DESCRIPTORS: DESCRIPTORS: ACHING;SORE

## 2023-10-27 ASSESSMENT — PAIN DESCRIPTION - LOCATION: LOCATION: ARM

## 2023-10-27 NOTE — PROGRESS NOTES
Brad Small complained of pain, tenderness, and tightness in her upper left arm where she received a tetanus shot yesterday evening. This writer provided emotional support and educate about side effects of tetanus shots. La requested pain medication & PRN Tylenol 650mg PO was administered per MD order. See MAR.

## 2023-10-27 NOTE — PROGRESS NOTES
Group Therapy Note    Date: 10/26/2023  Start Time: 20:00  End Time:  21:00  Number of Participants: 7    Type of Group: Recreational  wrap up    Wellness Binder Information  Module Name:  /  Session Number:  /    Patient's Goal:  coping skills    Notes:  continuing to work on goal    Status After Intervention:  Unchanged    Participation Level:  Active Listener and Interactive    Participation Quality: Appropriate, Attentive, and Sharing      Speech:  normal      Thought Process/Content: Logical      Affective Functioning: Blunted      Mood: depressed      Level of consciousness:  Alert and Attentive      Response to Learning: Able to change behavior      Endings: None Reported    Modes of Intervention: Socialization and Problem-solving      Discipline Responsible: Behavorial Health Tech      Signature:  Jeaneth Castillo

## 2023-10-27 NOTE — DISCHARGE SUMMARY
Final   Admission on 10/25/2023, Discharged on 10/26/2023   Component Date Value Ref Range Status    TSH 10/26/2023 1.15  0.27 - 4.20 uIU/mL Final        Mental Status Exam at Discharge:  Level of consciousness:  awake  Appearance:  well-appearing, in chair, good grooming and good hygiene well-developed, well-nourished  Behavior/Motor:  no abnormalities noted normal gait and station AIMS: 0  Attitude toward examiner:  cooperative, attentive and good eye contact  Speech:  spontaneous, normal rate, normal volume and well articulated  Mood:  dysthymic  Affect:  mood congruent Anxiety: mild  Hallucinations: Absent  Thought processes:  coherent Attention span, Concentration & Attention:  attention span and concentration were age appropriate  Thought content:   no evidence of delusions OCD: none    Insight: normal insight and judgment Cognition:  oriented to person, place, and time  Fund of Knowledge: average  IQ:average Memory: intact  Suicide:  No specific plan to harm self  Sleep: sleeps through the night  Appetite: ok   Reassess [place] Risk:  no specific plan to harm self Pt has phone numbers to contact if suicidal thoughts recur and states pt will return to the hospital if suicidal feelings return.    Hospital Routine Meds:     DULoxetine  60 mg Oral BID    nicotine  1 patch TransDERmal Daily    aspirin  81 mg Oral Daily    atorvastatin  80 mg Oral Nightly    clopidogrel  75 mg Oral Daily    lisinopril  5 mg Oral Daily    carvedilol  6.25 mg Oral BID WC    gabapentin  400 mg Oral BID    vitamin B-12  1,000 mcg Oral Daily    vitamin D  50,000 Units Oral Weekly      Hospital PRN Meds: acetaminophen, ibuprofen, magnesium hydroxide, nicotine polacrilex, aluminum & magnesium hydroxide-simethicone, OLANZapine **OR** OLANZapine (ZyPREXA) 10 mg in sterile water 2 mL injection, benztropine mesylate   Discharge Meds:    Current Discharge Medication List             Details   gabapentin (NEURONTIN) 400 MG capsule Take 1 capsule by
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
## 2023-10-27 NOTE — H&P
nicotine  1 patch TransDERmal Daily    aspirin  81 mg Oral Daily    atorvastatin  80 mg Oral Nightly    clopidogrel  75 mg Oral Daily    lisinopril  5 mg Oral Daily    carvedilol  6.25 mg Oral BID WC    gabapentin  400 mg Oral BID    vitamin B-12  1,000 mcg Oral Daily    vitamin D  50,000 Units Oral Weekly      PRN Meds: acetaminophen, ibuprofen, magnesium hydroxide, nicotine polacrilex, aluminum & magnesium hydroxide-simethicone, OLANZapine **OR** OLANZapine (ZyPREXA) 10 mg in sterile water 2 mL injection, benztropine mesylate   Estimated length of stay: 5-6 d  Prognosis:  fair   Criteria for Discharge:    Not suicidal, sleeping well, affect stable, depression improving, eating well, aftercare arranged. Spent > 50 minutes evaluating and treating patient with more than 50 % of time spent with patient discussing care    ______  PLAN    1. Admit to Adult Behavioral Unit 2. Consult Internal Medicine to evaluate and treat medical conditions  3. Adjust psychotropic medications to target symptoms  Increase Cymbalta 60 mg BID  4. Occupational Therapy, Physical Therapy, Group Psychotherapy as tolerated   5. Reviewed treatment plan with patient including medication risks, benefits, side effects. Obtained informed consent for treatment.        Sharon Sierra MD  Physician Psychiatry

## 2023-10-27 NOTE — PLAN OF CARE
Patient alert and oriented x 3. Patient rates Depression 8/10 and Anxiety 5/10. Patient in her room sitting @ her desk writing in her journal. Patient denies SI/HI/A/V/H. Patient took HS medications. Patient denies sekf harm. No c/o's voices @ present.

## 2023-10-28 LAB
EST. AVERAGE GLUCOSE BLD GHB EST-MCNC: 105.4 MG/DL
HBA1C MFR BLD: 5.3 %

## 2023-10-28 PROCEDURE — 99232 SBSQ HOSP IP/OBS MODERATE 35: CPT

## 2023-10-28 PROCEDURE — 1240000000 HC EMOTIONAL WELLNESS R&B

## 2023-10-28 PROCEDURE — 6370000000 HC RX 637 (ALT 250 FOR IP): Performed by: PSYCHIATRY & NEUROLOGY

## 2023-10-28 RX ADMIN — DULOXETINE HYDROCHLORIDE 60 MG: 60 CAPSULE, DELAYED RELEASE ORAL at 11:28

## 2023-10-28 RX ADMIN — CLOPIDOGREL BISULFATE 75 MG: 75 TABLET ORAL at 11:28

## 2023-10-28 RX ADMIN — GABAPENTIN 400 MG: 400 CAPSULE ORAL at 11:27

## 2023-10-28 RX ADMIN — ATORVASTATIN CALCIUM 80 MG: 40 TABLET, FILM COATED ORAL at 21:00

## 2023-10-28 RX ADMIN — DULOXETINE HYDROCHLORIDE 60 MG: 60 CAPSULE, DELAYED RELEASE ORAL at 21:00

## 2023-10-28 RX ADMIN — GABAPENTIN 400 MG: 400 CAPSULE ORAL at 21:00

## 2023-10-28 RX ADMIN — LISINOPRIL 5 MG: 5 TABLET ORAL at 11:29

## 2023-10-28 RX ADMIN — CARVEDILOL 6.25 MG: 6.25 TABLET, FILM COATED ORAL at 17:45

## 2023-10-28 RX ADMIN — CARVEDILOL 6.25 MG: 6.25 TABLET, FILM COATED ORAL at 11:27

## 2023-10-28 RX ADMIN — ASPIRIN 81 MG: 81 TABLET, CHEWABLE ORAL at 11:28

## 2023-10-28 RX ADMIN — Medication 1000 MCG: at 11:28

## 2023-10-28 RX ADMIN — OLANZAPINE 10 MG: 10 TABLET, FILM COATED ORAL at 03:44

## 2023-10-28 NOTE — PLAN OF CARE
Problem: Pain  Goal: Verbalizes/displays adequate comfort level or baseline comfort level  Outcome: Progressing     Problem: Self Harm/Suicidality  Goal: Will have no self-injury during hospital stay  Description: INTERVENTIONS:  1. Ensure constant observer at bedside with Q15M safety checks  2. Maintain a safe environment  3. Secure patient belongings  4. Ensure family/visitors adhere to safety recommendations  5. Ensure safety tray has been added to patient's diet order  6. Every shift and PRN: Re-assess suicidal risk via Frequent Screener    Outcome: Progressing     Problem: Anxiety  Goal: Will report anxiety at manageable levels  Description: INTERVENTIONS:  1. Administer medication as ordered  2. Teach and rehearse alternative coping skills  3. Provide emotional support with 1:1 interaction with staff  Outcome: Progressing     Problem: Depression/Self Harm  Goal: Effect of psychiatric condition will be minimized and patient will be protected from self harm  Description: INTERVENTIONS:  1. Assess impact of patient's symptoms on level of functioning, self care needs and offer support as indicated  2. Assess patient/family knowledge of depression, impact on illness and need for teaching  3. Provide emotional support, presence and reassurance  4. Assess for possible suicidal thoughts or ideation. If patient expresses suicidal thoughts or statements do not leave alone, initiate Suicide Precautions, move to a room close to the nursing station and obtain sitter  5. Initiate consults as appropriate with Mental Health Professional, Spiritual Care, Psychosocial CNS, and consider a recommendation to the LIP for a Psychiatric Consultation  Outcome: Progressing  Flowsheets (Taken 10/27/2023 2149)  Effect of psychiatric condition will be minimized and patient will be protected from self harm:   Assess for suicidal thoughts or ideation.  If patient expresses suicidal thoughts or statements do not leave alone, initiate

## 2023-10-28 NOTE — BH NOTE
Patient has been in bed most of shift expressing feeling tired from not getting much rest from previous night due to another patient on unit being loud keeping her awake. Patient is calm, cooperative and coloring in dayroom with peers.

## 2023-10-28 NOTE — PLAN OF CARE
Problem: Pain  Goal: Verbalizes/displays adequate comfort level or baseline comfort level  Outcome: Progressing     Problem: Self Harm/Suicidality  Goal: Will have no self-injury during hospital stay  Description: INTERVENTIONS:  1. Ensure constant observer at bedside with Q15M safety checks  2. Maintain a safe environment  3. Secure patient belongings  4. Ensure family/visitors adhere to safety recommendations  5. Ensure safety tray has been added to patient's diet order  6. Every shift and PRN: Re-assess suicidal risk via Frequent Screener    Outcome: Progressing      10/28/23 1655   Mental Status and Behavioral Exam   Normal No   Level of Assistance Independent/Self   Facial Expression Brightened   Affect Appropriate   Level of Consciousness Alert   Frequency of Checks 4 times per hour, close   Mood:Normal No   Mood Anxious   Motor Activity:Normal No   Motor Activity Decreased   Eye Contact Good   Observed Behavior Cooperative;Friendly   Sexual Misconduct History Current - no   Preception [place] to person;[place] to time;[place] to place;[place] to situation   Attention:Normal No   Attention Distractible   Thought Processes Unremarkable   Thought Content:Normal Yes   Depression Symptoms Sleep disturbance; Impaired concentration; Change in energy level   Anxiety Symptoms Generalized   [place] Symptoms No problems reported or observed.    Hallucinations None   Delusions No   Memory:Normal Yes   Insight and Judgment No   Insight and Judgment Poor judgment;Poor insight

## 2023-10-28 NOTE — PROGRESS NOTES
Final    Diagnosis 10/26/2023 Normal sinus rhythmT wave abnormality, consider anterior ischemiaAbnormal ECGWhen compared with ECG of 25-OCT-2023 16:04,arm lead switch resolvedT wave inversion now evident in Anterior leadsConfirmed by Chastity Lozada MD, [place] ([id number]) on 10/27/2023 7:59:58 AM   Final    Hemoglobin A1C 10/26/2023 5.3  See comment % Final    Comment: Comment:  Diagnosis of Diabetes: > or = 6.5%  Increased risk of diabetes (Prediabetes): 5.7-6.4%  Glycemic Control: Nonpregnant Adults: <7.0%                    Pregnant: <6.0%        eAG 10/26/2023 105.4  mg/dL Final            Medications  Current Facility-Administered Medications: DULoxetine (CYMBALTA) extended release capsule 60 mg, 60 mg, Oral, BID  acetaminophen (TYLENOL) tablet 650 mg, 650 mg, Oral, Q4H PRN  ibuprofen (ADVIL;MOTRIN) tablet 400 mg, 400 mg, Oral, Q6H PRN  magnesium hydroxide (MILK OF MAGNESIA) 400 MG/5ML suspension 30 mL, 30 mL, Oral, Daily PRN  nicotine (NICODERM CQ) 21 MG/24HR 1 patch, 1 patch, TransDERmal, Daily  nicotine polacrilex (COMMIT) lozenge 2 mg, 2 mg, Oral, Q1H PRN  aluminum & magnesium hydroxide-simethicone (MAALOX) 200-200-20 MG/5ML suspension 30 mL, 30 mL, Oral, Q6H PRN  OLANZapine (ZYPREXA) tablet 10 mg, 10 mg, Oral, Q4H PRN **OR** OLANZapine (ZyPREXA) 10 mg in sterile water 2 mL injection, 10 mg, IntraMUSCular, Q4H PRN  benztropine mesylate (COGENTIN) injection 2 mg, 2 mg, IntraMUSCular, BID PRN  aspirin chewable tablet 81 mg, 81 mg, Oral, Daily  atorvastatin (LIPITOR) tablet 80 mg, 80 mg, Oral, Nightly  clopidogrel (PLAVIX) tablet 75 mg, 75 mg, Oral, Daily  lisinopril (PRINIVIL;ZESTRIL) tablet 5 mg, 5 mg, Oral, Daily  carvedilol (COREG) tablet 6.25 mg, 6.25 mg, Oral, BID WC  gabapentin (NEURONTIN) capsule 400 mg, 400 mg, Oral, BID  vitamin B-12 (CYANOCOBALAMIN) tablet 1,000 mcg, 1,000 mcg, Oral, Daily  vitamin D (ERGOCALCIFEROL) capsule 50,000 Units, 50,000 Units, Oral, Weekly    ASSESSMENT AND PLAN    Principal Problem:

## 2023-10-28 NOTE — PROGRESS NOTES
Pt woke up at 0315 complaining that she was disturb by other pt. Noted a bit anxious, irritable and agitated. PRN zyprexa given for agitation. Went back to her room after taking meds.

## 2023-10-29 ENCOUNTER — APPOINTMENT (OUTPATIENT)
Dept: CT IMAGING | Age: 58
DRG: 751 | End: 2023-10-29
Attending: PSYCHIATRY & NEUROLOGY
Payer: MEDICAID

## 2023-10-29 PROBLEM — R42 DIZZINESS: Status: ACTIVE | Noted: 2023-10-29

## 2023-10-29 LAB
ANION GAP SERPL CALCULATED.3IONS-SCNC: 10 MMOL/L (ref 3–16)
BASOPHILS # BLD: 0 K/UL (ref 0–0.2)
BASOPHILS NFR BLD: 0.6 %
BUN SERPL-MCNC: 21 MG/DL (ref 7–20)
CALCIUM SERPL-MCNC: 9.7 MG/DL (ref 8.3–10.6)
CHLORIDE SERPL-SCNC: 99 MMOL/L (ref 99–110)
CO2 SERPL-SCNC: 25 MMOL/L (ref 21–32)
CREAT SERPL-MCNC: 0.6 MG/DL (ref 0.6–1.1)
DEPRECATED RDW RBC AUTO: 13.1 % (ref 12.4–15.4)
EOSINOPHIL # BLD: 0.4 K/UL (ref 0–0.6)
EOSINOPHIL NFR BLD: 6.2 %
GFR SERPLBLD CREATININE-BSD FMLA CKD-EPI: >60 ML/MIN/{1.73_M2}
GLUCOSE SERPL-MCNC: 92 MG/DL (ref 70–99)
HCT VFR BLD AUTO: 37.6 % (ref 36–48)
HGB BLD-MCNC: 12.8 G/DL (ref 12–16)
LYMPHOCYTES # BLD: 2.5 K/UL (ref 1–5.1)
LYMPHOCYTES NFR BLD: 34.9 %
MCH RBC QN AUTO: 34.6 PG (ref 26–34)
MCHC RBC AUTO-ENTMCNC: 34 G/DL (ref 31–36)
MCV RBC AUTO: 101.9 FL (ref 80–100)
MONOCYTES # BLD: 0.5 K/UL (ref 0–1.3)
MONOCYTES NFR BLD: 7.1 %
NEUTROPHILS # BLD: 3.6 K/UL (ref 1.7–7.7)
NEUTROPHILS NFR BLD: 51.2 %
PLATELET # BLD AUTO: 393 K/UL (ref 135–450)
PMV BLD AUTO: 7.9 FL (ref 5–10.5)
POTASSIUM SERPL-SCNC: 4.2 MMOL/L (ref 3.5–5.1)
RBC # BLD AUTO: 3.69 M/UL (ref 4–5.2)
SODIUM SERPL-SCNC: 134 MMOL/L (ref 136–145)
WBC # BLD AUTO: 7.1 K/UL (ref 4–11)

## 2023-10-29 PROCEDURE — 80048 BASIC METABOLIC PNL TOTAL CA: CPT

## 2023-10-29 PROCEDURE — 85025 COMPLETE CBC W/AUTO DIFF WBC: CPT

## 2023-10-29 PROCEDURE — 36415 COLL VENOUS BLD VENIPUNCTURE: CPT

## 2023-10-29 PROCEDURE — 99232 SBSQ HOSP IP/OBS MODERATE 35: CPT

## 2023-10-29 PROCEDURE — 6370000000 HC RX 637 (ALT 250 FOR IP): Performed by: PSYCHIATRY & NEUROLOGY

## 2023-10-29 PROCEDURE — 1240000000 HC EMOTIONAL WELLNESS R&B

## 2023-10-29 PROCEDURE — 70450 CT HEAD/BRAIN W/O DYE: CPT

## 2023-10-29 RX ADMIN — GABAPENTIN 400 MG: 400 CAPSULE ORAL at 20:38

## 2023-10-29 RX ADMIN — DULOXETINE HYDROCHLORIDE 60 MG: 60 CAPSULE, DELAYED RELEASE ORAL at 20:38

## 2023-10-29 RX ADMIN — GABAPENTIN 400 MG: 400 CAPSULE ORAL at 10:01

## 2023-10-29 RX ADMIN — CLOPIDOGREL BISULFATE 75 MG: 75 TABLET ORAL at 10:01

## 2023-10-29 RX ADMIN — ASPIRIN 81 MG: 81 TABLET, CHEWABLE ORAL at 10:01

## 2023-10-29 RX ADMIN — CARVEDILOL 6.25 MG: 6.25 TABLET, FILM COATED ORAL at 10:01

## 2023-10-29 RX ADMIN — CARVEDILOL 6.25 MG: 6.25 TABLET, FILM COATED ORAL at 17:26

## 2023-10-29 RX ADMIN — Medication 1000 MCG: at 10:01

## 2023-10-29 RX ADMIN — ATORVASTATIN CALCIUM 80 MG: 40 TABLET, FILM COATED ORAL at 20:38

## 2023-10-29 RX ADMIN — LISINOPRIL 5 MG: 5 TABLET ORAL at 10:00

## 2023-10-29 RX ADMIN — DULOXETINE HYDROCHLORIDE 60 MG: 60 CAPSULE, DELAYED RELEASE ORAL at 10:01

## 2023-10-29 ASSESSMENT — PAIN SCALES - GENERAL: PAINLEVEL_OUTOF10: 0

## 2023-10-29 NOTE — PLAN OF CARE
[facility]  Treatment Team Note  Review Date & Time: 10/29/23  0930    Patient was not in treatment team      Status EXAM:   Mental Status and Behavioral Exam  Normal: No  Level of Assistance: Independent/Self  Facial Expression: Brightened  Affect: Appropriate  Level of Consciousness: Alert  Frequency of Checks: 4 times per hour, close  Mood:Normal: No  Mood: Other (comment), Depressed  Motor Activity:Normal: No  Motor Activity: Decreased  Eye Contact: Good  Observed Behavior: Friendly, Cooperative  Sexual Misconduct History: Current - no  Preception: Griffin to person, Griffin to situation, Griffin to place, Griffin to time  Attention:Normal: No  Attention: Distractible  Thought Processes: Unremarkable  Thought Content:Normal: Yes  Thought Content: Paranoia  Depression Symptoms: Change in energy level, Isolative, Loss of interest, Sleep disturbance  Anxiety Symptoms: Generalized  [place] Symptoms: No problems reported or observed. Hallucinations: None  Delusions: No  Memory:Normal: Yes  Insight and Judgment: No  Insight and Judgment: Poor judgment, Poor insight      Suicide Risk CSSR-S:  1) Within the past month, have you wished you were dead or wished you could go to sleep and not wake up? : Yes  2) Have you actually had any thoughts of killing yourself? : Yes  3) Have you been thinking about how you might kill yourself? : Yes  5) Have you started to work out or worked out the details of how to kill yourself? Do you intend to carry out this plan? : Yes  6) Have you ever done anything, started to do anything, or prepared to do anything to end your life?: Yes      PLAN/TREATMENT RECOMMENDATIONS UPDATE:   Patient will take medication as prescribed, eat 75% of meals, attend groups, participate in milieu activities, participate in treatment team and care planning for discharge and follow up.            Paulo Peguero RN

## 2023-10-29 NOTE — BH NOTE
Comments: + interactions, + contribution, and + engagement.  +      Time: 9977-0639      Type of Group: Wrap up/relaxation      Level of Participation: 13/18

## 2023-10-29 NOTE — PLAN OF CARE
Problem: Pain  Goal: Verbalizes/displays adequate comfort level or baseline comfort level  Outcome: Progressing     Problem: Self Harm/Suicidality  Goal: Will have no self-injury during hospital stay  Description: INTERVENTIONS:  1. Ensure constant observer at bedside with Q15M safety checks  2. Maintain a safe environment  3. Secure patient belongings  4. Ensure family/visitors adhere to safety recommendations  5. Ensure safety tray has been added to patient's diet order  6. Every shift and PRN: Re-assess suicidal risk via Frequent Screener    Outcome: Progressing     Problem: Anxiety  Goal: Will report anxiety at manageable levels  Description: INTERVENTIONS:  1. Administer medication as ordered  2. Teach and rehearse alternative coping skills  3. Provide emotional support with 1:1 interaction with staff  Outcome: Progressing     Problem: Depression/Self Harm  Goal: Effect of psychiatric condition will be minimized and patient will be protected from self harm  Description: INTERVENTIONS:  1. Assess impact of patient's symptoms on level of functioning, self care needs and offer support as indicated  2. Assess patient/family knowledge of depression, impact on illness and need for teaching  3. Provide emotional support, presence and reassurance  4. Assess for possible suicidal thoughts or ideation. If patient expresses suicidal thoughts or statements do not leave alone, initiate Suicide Precautions, move to a room close to the nursing station and obtain sitter  5. Initiate consults as appropriate with Mental Health Professional, Spiritual Care, Psychosocial CNS, and consider a recommendation to the LIP for a Psychiatric Consultation  Outcome: Progressing     Problem: Nutrition Deficit:  Goal: Optimize nutritional status  Outcome: Progressing     Patient was visible on unit, social with peers. Medication complaint. Attended and participated in groups. Patient was cooperative with interview.   Denies

## 2023-10-30 ENCOUNTER — FOLLOWUP TELEPHONE ENCOUNTER (OUTPATIENT)
Dept: PSYCHIATRY | Age: 58
End: 2023-10-30

## 2023-10-30 PROCEDURE — 6370000000 HC RX 637 (ALT 250 FOR IP): Performed by: PSYCHIATRY & NEUROLOGY

## 2023-10-30 PROCEDURE — 1240000000 HC EMOTIONAL WELLNESS R&B

## 2023-10-30 PROCEDURE — 6370000000 HC RX 637 (ALT 250 FOR IP): Performed by: NURSE PRACTITIONER

## 2023-10-30 PROCEDURE — 99233 SBSQ HOSP IP/OBS HIGH 50: CPT | Performed by: PSYCHIATRY & NEUROLOGY

## 2023-10-30 RX ORDER — LORAZEPAM 1 MG/1
1 TABLET ORAL ONCE
Status: COMPLETED | OUTPATIENT
Start: 2023-10-30 | End: 2023-10-30

## 2023-10-30 RX ORDER — ESCITALOPRAM OXALATE 10 MG/1
10 TABLET ORAL DAILY
Status: DISCONTINUED | OUTPATIENT
Start: 2023-10-30 | End: 2023-11-01 | Stop reason: HOSPADM

## 2023-10-30 RX ORDER — DULOXETIN HYDROCHLORIDE 60 MG/1
60 CAPSULE, DELAYED RELEASE ORAL DAILY
Status: DISCONTINUED | OUTPATIENT
Start: 2023-10-31 | End: 2023-10-31

## 2023-10-30 RX ADMIN — CARVEDILOL 6.25 MG: 6.25 TABLET, FILM COATED ORAL at 18:47

## 2023-10-30 RX ADMIN — ASPIRIN 81 MG: 81 TABLET, CHEWABLE ORAL at 09:03

## 2023-10-30 RX ADMIN — CARVEDILOL 6.25 MG: 6.25 TABLET, FILM COATED ORAL at 09:04

## 2023-10-30 RX ADMIN — LORAZEPAM 1 MG: 1 TABLET ORAL at 23:11

## 2023-10-30 RX ADMIN — ESCITALOPRAM OXALATE 10 MG: 10 TABLET ORAL at 14:40

## 2023-10-30 RX ADMIN — Medication 1000 MCG: at 09:04

## 2023-10-30 RX ADMIN — CLOPIDOGREL BISULFATE 75 MG: 75 TABLET ORAL at 09:04

## 2023-10-30 RX ADMIN — LISINOPRIL 5 MG: 5 TABLET ORAL at 09:04

## 2023-10-30 RX ADMIN — DULOXETINE HYDROCHLORIDE 60 MG: 60 CAPSULE, DELAYED RELEASE ORAL at 09:04

## 2023-10-30 RX ADMIN — GABAPENTIN 400 MG: 400 CAPSULE ORAL at 09:04

## 2023-10-30 RX ADMIN — GABAPENTIN 400 MG: 400 CAPSULE ORAL at 20:44

## 2023-10-30 RX ADMIN — ATORVASTATIN CALCIUM 80 MG: 40 TABLET, FILM COATED ORAL at 20:44

## 2023-10-30 ASSESSMENT — PAIN SCALES - GENERAL: PAINLEVEL_OUTOF10: 0

## 2023-10-30 NOTE — BH NOTE
Comments: + interactions, + contribution, and + engagement.  +      Time: 4790-3373      Type of Group: Wrap up/relaxation      Level of Participation: 13/19

## 2023-10-30 NOTE — PLAN OF CARE
Problem: Anxiety  Goal: Will report anxiety at manageable levels  Description: INTERVENTIONS:  1. Administer medication as ordered  2. Teach and rehearse alternative coping skills  3. Provide emotional support with 1:1 interaction with staff  10/30/2023 0306 by Sly Morrissey RN  Outcome: Progressing  10/29/2023 1343 by Roxanna Pineda RN  Outcome: Progressing     Problem: Depression/Self Harm  Goal: Effect of psychiatric condition will be minimized and patient will be protected from self harm  Description: INTERVENTIONS:  1. Assess impact of patient's symptoms on level of functioning, self care needs and offer support as indicated  2. Assess patient/family knowledge of depression, impact on illness and need for teaching  3. Provide emotional support, presence and reassurance  4. Assess for possible suicidal thoughts or ideation. If patient expresses suicidal thoughts or statements do not leave alone, initiate Suicide Precautions, move to a room close to the nursing station and obtain sitter  5. Initiate consults as appropriate with Mental Health Professional, Spiritual Care, Psychosocial CNS, and consider a recommendation to the LIP for a Psychiatric Consultation  10/30/2023 0306 by Sly Morrissey RN  Outcome: Progressing  Flowsheets (Taken 10/30/2023 0144)  Effect of psychiatric condition will be minimized and patient will be protected from self harm:   Assess for suicidal thoughts or ideation. If patient expresses suicidal thoughts or statements do not leave alone, initiate Suicide Precautions, move near nurse station, obtain sitter   Provide emotional support, presence and reassurance  10/29/2023 1343 by Roxanna Pineda RN  Outcome: Progressing     Problem: Nutrition Deficit:  Goal: Optimize nutritional status  10/30/2023 0306 by Sly Morrissey RN  Outcome: Progressing  10/29/2023 1343 by Roxanna Pineda RN  Outcome: Progressing    Pt visualized on Unit. Vitals stable. Attended group.  Socialized with Peers. Care compliant. Able to verbalize needs. Cooperative. Slept well. Denies SI/HI/AVH Will continue to monitor

## 2023-10-30 NOTE — GROUP NOTE
Group Therapy Note    Date: 10/30/2023    Group Start Time: 1000  Group End Time: 1100  Group Topic: Psychoeducation    [facility]    RADHA Shay        Group Therapy Note  Clinician engaged the group with educating them on the different types of therapies for different disorders. They asked many questions about DBT and clinician informed the group they must first have a good understanding of CBT as DBT was created out of CBT. Clinician provided the basics on both therapeutic techniques and gave clarification to the group members questions. Attendees: 15       Patient's Goal:      Notes:  Patient was fully engaged in the group discussion and activity. Patient was able to participate in stating what they knew about different types of therapies and had clarifying questions to ask about them. Status After Intervention:  Improved    Participation Level:  Active Listener and Interactive    Participation Quality: Appropriate, Attentive, Sharing, and Supportive      Speech:  normal      Thought Process/Content: Logical      Affective Functioning: Congruent and Incongruent      Mood: anxious and fearful      Level of consciousness:  Attentive      Response to Learning: Able to verbalize current knowledge/experience      Endings: None Reported    Modes of Intervention: Education, Support, Exploration, and Activity      Discipline Responsible: /Counselor      Signature:  RADHA Shay

## 2023-10-30 NOTE — PROGRESS NOTES
Department of Psychiatry  AttendingProgress Note  Chief Complaint: depression   La had a weekend of anxiety. She described abdominal pain. She talked about her history with medication and felt that Lexapro was helpful with her anxiety. Will need to taper Cymbalta to 60 mg QD and further taper due to concerns with potential for Serotonin Syndrome.   Begin Lexapro 10 mg QD. No current SI . She is considering changing jobs due to the stress of working at [employer].   Patient's chart was reviewed and collaborated with  about the treatment plan.  SUBJECTIVE:    Patient is feeling better. Suicidal ideation:  denies suicidal ideation.  Patient does not have medication side effects.    ROS: Patient has new complaints: no  Sleeping adequately:  Yes   Appetite adequate: Yes  Attending groups: Yes  Visitors:Yes    OBJECTIVE    Physical  VITALS:  /73   Pulse 71   Temp 96.9 °F (36.1 °C) (Temporal)   Resp 16   Ht 1.626 m (5' 4.02\")   Wt 72.4 kg (159 lb 9.6 oz)   LMP  (LMP Unknown)   SpO2 98%   BMI 27.38 kg/m²     Mental Status Examination:  Patients appearance was street clothes. Thoughts are Goal directed. Homicidal ideations none.  No abnormal movements, tics or mannerisms.  Memory intact Aims 0. Concentration Good.   Alert and oriented X 4. Insight and Judgement impaired insight. Patient was cooperative. Patient gait normal. Mood within normal limits, affect normal affect Hallucinations Absent, suicidal ideations no specific plan to harm self Speech normal pitch and normal volume  Data  Labs:   Admission on 10/26/2023   Component Date Value Ref Range Status    Ventricular Rate 10/26/2023 62  BPM Final    Atrial Rate 10/26/2023 62  BPM Final    P-R Interval 10/26/2023 140  ms Final    QRS Duration 10/26/2023 84  ms Final    Q-T Interval 10/26/2023 416  ms Final    QTc Calculation (Bazett) 10/26/2023 422  ms Final    P Axis 10/26/2023 31  degrees Final    R Axis 10/26/2023 58  degrees Final    T  mg, Oral, BID WC  gabapentin (NEURONTIN) capsule 400 mg, 400 mg, Oral, BID  vitamin B-12 (CYANOCOBALAMIN) tablet 1,000 mcg, 1,000 mcg, Oral, Daily  vitamin D (ERGOCALCIFEROL) capsule 50,000 Units, 50,000 Units, Oral, Weekly    ASSESSMENT AND PLAN    Principal Problem:    Major depression, recurrent (HCC)  Active Problems:    Dizziness  Resolved Problems:    * No resolved hospital problems. *       1. Patient s symptoms   are improving  2. Probable discharge is 1-2 d  3. Discharge planning is complete  4. Suicidal ideation is  none  5. Total time with patient was 50 minutes and more than 50 % of that time was spent counseling the patient on their symptoms, treatment and expected goals.           Kevin Celis MD  Physician Psychiatry

## 2023-10-30 NOTE — GROUP NOTE
Group Therapy Note    Date: 10/30/2023    Group Start Time: 1115  Group End Time: 1940 RADHA Carlson, KATIW        Group Therapy Note    Attendees: 9    SW used active listening to engage in conversation while completing a goal oriented check in. The group then worked to share and discuss their goals while applying the SMART goal metrics and adjusting accordingly    Notes:  Pt was present and alert for the group. The Pt completed the check in and worked with their peers to assess and update their goal according to the SMART goal guidelines    Status After Intervention:  Improved    Participation Level:  Active Listener and Interactive    Participation Quality: Appropriate, Attentive, and Sharing      Speech:  normal      Thought Process/Content: Logical      Affective Functioning: Congruent      Mood: euthymic      Level of consciousness:  Alert      Response to Learning: Able to verbalize current knowledge/experience and Able to verbalize/acknowledge new learning      Endings: None Reported    Modes of Intervention: Education, Support, and Problem-solving      Discipline Responsible: /Counselor      Signature:  RDAHA Beckett, [place]

## 2023-10-30 NOTE — PROGRESS NOTES
Group Therapy Note    Date: 10/30/2023  Start Time: 1300  End Time:  1400  Number of Participants: 9    Type of Group: Music Group    Notes:  Pt present for Music Group. Pt actively participated by making song selections and singing along to music. Participation Level:  Active Listener and Interactive    Participation Quality: Appropriate and Attentive      Speech:  normal      Affective Functioning: Congruent      Endings: None Reported    Modes of Intervention: Support, Socialization, Activity, and Media      Discipline Responsible: Chaplain Inocencia Diez       10/30/23 1432   Encounter Summary   Encounter Overview/Reason  Behavioral Health   Service Provided For: Patient   Last Encounter    (10/30 Music Group)   Complexity of Encounter Moderate   Begin Time 1300   End Time  1400   Total Time Calculated 60 min   Behavioral Health    Type  Spirituality Group

## 2023-10-30 NOTE — PLAN OF CARE
Problem: Pain  Goal: Verbalizes/displays adequate comfort level or baseline comfort level  10/30/2023 1425 by Fani Hadley RN  Outcome: Progressing     Problem: Self Harm/Suicidality  Goal: Will have no self-injury during hospital stay  Description: INTERVENTIONS:  1.  Ensure constant observer at bedside with Q15M safety checks  2.  Maintain a safe environment  3.  Secure patient belongings  4.  Ensure family/visitors adhere to safety recommendations  5.  Ensure safety tray has been added to patient's diet order  6.  Every shift and PRN: Re-assess suicidal risk via Frequent Screener    10/30/2023 1425 by Fani Hadley RN  Outcome: Progressing     Problem: Anxiety  Goal: Will report anxiety at manageable levels  Description: INTERVENTIONS:  1. Administer medication as ordered  2. Teach and rehearse alternative coping skills  3. Provide emotional support with 1:1 interaction with staff  10/30/2023 1425 by Fani Hadley RN  Outcome: Progressing     Problem: Depression/Self Harm  Goal: Effect of psychiatric condition will be minimized and patient will be protected from self harm  Description: INTERVENTIONS:  1. Assess impact of patient's symptoms on level of functioning, self care needs and offer support as indicated  2. Assess patient/family knowledge of depression, impact on illness and need for teaching  3. Provide emotional support, presence and reassurance  4. Assess for possible suicidal thoughts or ideation. If patient expresses suicidal thoughts or statements do not leave alone, initiate Suicide Precautions, move to a room close to the nursing station and obtain sitter  5. Initiate consults as appropriate with Mental Health Professional, Spiritual Care, Psychosocial CNS, and consider a recommendation to the LIP for a Psychiatric Consultation  10/30/2023 1425 by Fani Hadley RN  Outcome: Progressing     Problem: Nutrition Deficit:  Goal: Optimize nutritional status  10/30/2023 1425 by  Mark Falcon RN  Outcome: Progressing   Patient was visible on unit, social with peers. Medication complaint. Attended and participated in groups. Patient was cooperative with interview. Denies SI/HI/AVH. Patient remains tearful at times with high anxiety but able to utilize coping skills, no PRN's needed. She has had optimal nutritional intake this shift. No complaints of pain. She has been able to verbalize her needs to staff and has been cooperative with all care.

## 2023-10-31 ENCOUNTER — FOLLOWUP TELEPHONE ENCOUNTER (OUTPATIENT)
Dept: PSYCHIATRY | Age: 58
End: 2023-10-31

## 2023-10-31 PROCEDURE — 1240000000 HC EMOTIONAL WELLNESS R&B

## 2023-10-31 PROCEDURE — 6370000000 HC RX 637 (ALT 250 FOR IP): Performed by: NURSE PRACTITIONER

## 2023-10-31 PROCEDURE — 6370000000 HC RX 637 (ALT 250 FOR IP): Performed by: PSYCHIATRY & NEUROLOGY

## 2023-10-31 PROCEDURE — 99233 SBSQ HOSP IP/OBS HIGH 50: CPT | Performed by: PSYCHIATRY & NEUROLOGY

## 2023-10-31 RX ORDER — IBUPROFEN 200 MG
TABLET ORAL 2 TIMES DAILY
Status: DISCONTINUED | OUTPATIENT
Start: 2023-10-31 | End: 2023-11-01 | Stop reason: HOSPADM

## 2023-10-31 RX ORDER — DULOXETIN HYDROCHLORIDE 30 MG/1
30 CAPSULE, DELAYED RELEASE ORAL DAILY
Status: DISCONTINUED | OUTPATIENT
Start: 2023-11-01 | End: 2023-11-01 | Stop reason: HOSPADM

## 2023-10-31 RX ADMIN — CLOPIDOGREL BISULFATE 75 MG: 75 TABLET ORAL at 09:32

## 2023-10-31 RX ADMIN — ATORVASTATIN CALCIUM 80 MG: 40 TABLET, FILM COATED ORAL at 20:15

## 2023-10-31 RX ADMIN — POLYMYXIN B SULFATE, BACITRACIN ZINC, NEOMYCIN SULFATE: 5000; 3.5; 4 OINTMENT TOPICAL at 11:03

## 2023-10-31 RX ADMIN — GABAPENTIN 400 MG: 400 CAPSULE ORAL at 20:15

## 2023-10-31 RX ADMIN — CARVEDILOL 6.25 MG: 6.25 TABLET, FILM COATED ORAL at 17:36

## 2023-10-31 RX ADMIN — ASPIRIN 81 MG: 81 TABLET, CHEWABLE ORAL at 09:32

## 2023-10-31 RX ADMIN — LISINOPRIL 5 MG: 5 TABLET ORAL at 09:32

## 2023-10-31 RX ADMIN — POLYMYXIN B SULFATE, BACITRACIN ZINC, NEOMYCIN SULFATE: 5000; 3.5; 4 OINTMENT TOPICAL at 20:17

## 2023-10-31 RX ADMIN — ACETAMINOPHEN 650 MG: 325 TABLET ORAL at 20:16

## 2023-10-31 RX ADMIN — ESCITALOPRAM OXALATE 10 MG: 10 TABLET ORAL at 09:32

## 2023-10-31 RX ADMIN — CARVEDILOL 6.25 MG: 6.25 TABLET, FILM COATED ORAL at 09:31

## 2023-10-31 RX ADMIN — DULOXETINE HYDROCHLORIDE 60 MG: 60 CAPSULE, DELAYED RELEASE ORAL at 09:32

## 2023-10-31 RX ADMIN — GABAPENTIN 400 MG: 400 CAPSULE ORAL at 09:31

## 2023-10-31 RX ADMIN — Medication 1000 MCG: at 09:31

## 2023-10-31 ASSESSMENT — PAIN SCALES - GENERAL
PAINLEVEL_OUTOF10: 6
PAINLEVEL_OUTOF10: 0

## 2023-10-31 ASSESSMENT — PAIN DESCRIPTION - DESCRIPTORS: DESCRIPTORS: ACHING;THROBBING

## 2023-10-31 ASSESSMENT — PAIN DESCRIPTION - LOCATION: LOCATION: HEAD
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
## 2023-10-31 NOTE — GROUP NOTE
Group Therapy Note    Date: 10/31/2023    Group Start Time: 1000  Group End Time: 4482  Group Topic: Psychoeducation    [address], [place]        Group Therapy Note    Attendees: 7    Therapist facilitated Psych Ed session discussing how to prepare for difficult conversations. Group members processed realistic expectations and the importance of assertive communication to support solution-based conversation and to help prevent arguments. Participants were given a worksheet to organize their thoughts and formulate a plan to prepare and execute a difficult conversation most effectively. Notes:  Patient was present and actively listening across session. Patient fully participated in writing activity, took notes, and was receptive to information shared during group discussion. Patient expressed her thoughts and met goal for group. Status After Intervention:  Improved    Participation Level:  Active Listener and Interactive    Participation Quality: Appropriate, Attentive, Sharing, and Supportive      Speech:  normal      Thought Process/Content: Logical      Affective Functioning: Congruent      Mood: euthymic      Level of consciousness:  Alert and Attentive      Response to Learning: Able to verbalize current knowledge/experience, Able to verbalize/acknowledge new learning, Capable of insight, and Progressing to goal      Endings: None Reported    Modes of Intervention: Education, Support, Socialization, Clarifying, Problem-solving, Activity, and Confrontation      Discipline Responsible: Psychoeducational Specialist and Recreational Therapist      Signature:  Melody Ferrari MA, [place]

## 2023-10-31 NOTE — PLAN OF CARE
La has been visible and very social with her peers this evening. This writer observed her coloring, laughing, and making jokes in the dayroom. La stated that she started Lexapro today & reported that she already feels better. La was observed organizing the coloring pages, wiping down tables, and walking in the halls. When this writer inquired if she was okay, she stated that she was having trouble sleeping. This writer offered Trazodone & she stated that it gives her nightmares. This writer offered Melatonin & La stated that it doesn't work for her. This writer offered Atarax & La stated that it doesn't work to help her sleep. La asked for \"Ashivan\" and said that it helps her sleep and doesn't give her nightmares. This writer contacted [id number], Nicole Alejandro, and she put in a one-time dose of Ativan 1mg PO. La requested Neosporin for self-inflicted cuts on her right lower leg that are healing and itchy. La denies SI, HI, and AVH.     Problem: Pain  Goal: Verbalizes/displays adequate comfort level or baseline comfort level  10/31/2023 0205 by Zoraida Chappell RN  Outcome: Progressing     Problem: Self Harm/Suicidality  Goal: Will have no self-injury during hospital stay  Description: INTERVENTIONS:  1.  Ensure constant observer at bedside with Q15M safety checks  2.  Maintain a safe environment  3.  Secure patient belongings  4.  Ensure family/visitors adhere to safety recommendations  5.  Ensure safety tray has been added to patient's diet order  6.  Every shift and PRN: Re-assess suicidal risk via Frequent Screener    10/31/2023 0205 by Zoraida Chappell, RN  Outcome: Progressing     Problem: Anxiety  Goal: Will report anxiety at manageable levels  Description: INTERVENTIONS:  1. Administer medication as ordered  2. Teach and rehearse alternative coping skills  3. Provide emotional support with 1:1 interaction with staff  10/31/2023 0205 by Zoraida Chappell, RN  Outcome: Progressing    Problem: Depression/Self Harm  Goal: Effect of psychiatric condition will be minimized and patient will be protected from self harm  Description: INTERVENTIONS:  1. Assess impact of patient's symptoms on level of functioning, self care needs and offer support as indicated  2. Assess patient/family knowledge of depression, impact on illness and need for teaching  3. Provide emotional support, presence and reassurance  4. Assess for possible suicidal thoughts or ideation. If patient expresses suicidal thoughts or statements do not leave alone, initiate Suicide Precautions, move to a room close to the nursing station and obtain sitter  5.  Initiate consults as appropriate with Mental Health Professional, Spiritual Care, Psychosocial CNS, and consider a recommendation to the LIP for a Psychiatric Consultation  10/31/2023 0205 by Zoey Osullivan RN  Outcome: Progressing

## 2023-10-31 NOTE — PROGRESS NOTES
Group Therapy Note    Date: 10/30/2023  Start Time: 20:00  End Time:  21:00  Number of Participants: 13    Type of Group: Recreational  wrap up    Wellness Binder Information  Module Name:  /  Session Number:  /    Patient's Goal:  coping skills    Notes:  continuing to work on goal    Status After Intervention:  Unchanged    Participation Level:  Active Listener and Interactive    Participation Quality: Appropriate, Attentive, and Sharing      Speech:  normal      Thought Process/Content: Logical      Affective Functioning: Exaggerated      Mood:  stable      Level of consciousness:  Alert, Oriented x4, and Attentive      Response to Learning: Able to change behavior      Endings: None Reported    Modes of Intervention: Socialization and Problem-solving      Discipline Responsible: Behavorial Health Tech      Signature:  Nidhi Abrams

## 2023-10-31 NOTE — GROUP NOTE
Group Therapy Note    Date: 10/31/2023    Group Start Time: 1330  Group End Time: 1400  Group Topic: [address] [facility], MSW, [facility]        Group Therapy Note    Attendees: 8    SW used active listening to engage in conversation and facilitate conversation around the skill of gratitude. The group discussed the connection between gratitude and happiness. The group then worked to share and discuss a person, place, and thing each of them are thankful for. Notes: The Pt was present and alert for group. The Pt was able to identify and discuss a person, place, and thing they are thankful for. Status After Intervention:  Improved    Participation Level:  Active Listener and Interactive    Participation Quality: Appropriate, Attentive, and Sharing      Speech:  normal      Thought Process/Content: Logical      Affective Functioning: Congruent      Mood: euthymic      Level of consciousness:  Alert      Response to Learning: Able to verbalize current knowledge/experience and Able to verbalize/acknowledge new learning      Endings: None Reported    Modes of Intervention: Education, Socialization, and Exploration      Discipline Responsible: /Counselor      Signature:  RADHA Dent [place]

## 2023-10-31 NOTE — PROGRESS NOTES
Polo Rutledge approached this writer stating that she was having trouble sleeping. This writer noted that Polo Rutledge had been walking in the halls, straightening up coloring pages, and wiping down tables in the dayroom. This writer offered Trazodone, but Polo Rutledge stated it gives her nightmares. This writer offered Melatonin, and she stated that it doesn't work. This writer offered Atarax, and Polo Rutledge stated that it doesn't help her sleep. Kinzajun Aamir stated, \"What about 'Ashivan'? I don't know what it's called. But I know it helps me sleep without side effects\". This writer contacted [id number], Tigre Braun, and she put in an one-time order for Ativan 1mg PO. It was administered successfully and La immediately went to her room to lay down.

## 2023-10-31 NOTE — DISCHARGE INSTRUCTIONS
Advanced Directives:  Patient does not have a surrogate decision maker appointed   Name (if yes): N/A Phone Number: N/A  Patient does not have a psychiatric and/ or medical advanced directive or power of .   Patient was  offered psychiatric and/ or medical advanced directive or power of  information/completion but declined to complete   Why not? N/A    Discharge Planning is Complete.    Discharge Date: 11/1/23    Reason for Hospitalization: Patient is a 57 y.o. female who presents after cutting herself. She stated that she moved away from an ex-boyfriend 2 years ago and was making improvements in her life until the boyfriend showed up again and she started dating him again.  Discharge Diagnosis: Major depression, recurrent (HCC)  Discharging to: Home    Your instructions:  Your physician here was Curly Nava MD. If you have any questions please call the unit at [phone] for the adult unit or [phone] for [facility].    Please note that we have a patient family advisory [place] that meets the second Wednesday of January and the second Wednesday of July at 5pm in [facility] at [facility]. Department leadership would love for you to attend to give feedback on what we are doing well and areas in which we can improve our patient care. Please come if you are able and feel free to reach out to [facility] at [phone] with any questions.     The crisis number for [facility] is [phone]. This crisis line is available 24 hours a day, seven days a week.     Please follow up with your PCP regarding any pending labs.     You have indicated to us that you would be agreeable to [facility]'s PHP/IOP outpatient therapy group. [facility] has scheduled you an intake appointment. See below.   Name of Provider: [facility]  Provider specialty/license: Therapy, Medicine Management  Date  and time of appointment: 11/2/23 at 9:00 AM with Nannette Tim   The type/s of services requested are: Group psychotherapy  Agency name: [facility]  Address: [address] [place] [address] [place], [address]   Phone: [phone]  Special instructions (what to bring to appointment, etc.): On the day of intake, please register first with the  at the main entrance and then come to [facility] (you will only do this the first day). Discharge Completed By: RADHA Ordonez   Fax to: Follow up provider name and number  No fax needed, transferred through EHR    The following personal items were collected during your admission, stored in locker and/or safe, and were returned to you:    Belongings  Dental Appliances: Uppers, Lowers  Vision - Corrective Lenses: Eyeglasses (reading)  Hearing Aid: None  Clothing: Footwear, Shirt, Undergarments, Pants  Jewelry: Ring  Body Piercings Removed: N/A  Electronic Devices: Cell Phone  Weapons (Notify Protective Services/Security): None  Other Valuables: Jeanella Ora, Lighter/Matches  Home Medications: Other (Comment) (patients locker, no controlled)  Valuables Given To: Patient, Brian Jean Baptiste (cellphone in safe)  Provide Name(s) of Who Valuable(s) Were Given To: all belongings in locker  Responsible person(s) in the waiting room: n/a  Patient approves for provider to speak to responsible person post operatively: No    By signing below, I understand and acknowledge receipt of the instructions indicated above.

## 2023-10-31 NOTE — PROGRESS NOTES
Department of Psychiatry  AttendingProess Note  Chief Complaint: depression  La is active in program.   She is looking forward to changing jobs this week as she will begin [employer] and leave [employer] after 12 years. She attends all groups.  Discussed dc for tomorrow. Is not having any SI at this time.   Medication change to Lexapro 10 mg tolerated and will reduce Cymbalta further to 30 mg QD with eventual dc after discharge    Patient's chart was reviewed and collaborated with  about the treatment plan.  SUBJECTIVE:    Patient is feeling better. Suicidal ideation:  denies suicidal ideation.  Patient does not have medication side effects.    ROS: Patient has new complaints: no  Sleeping adequately:  Yes   Appetite adequate: Yes  Attending groups: Yes  Visitors:No    OBJECTIVE    Physical  VITALS:  /77   Pulse 87   Temp 98.6 °F (37 °C) (Temporal)   Resp 16   Ht 1.626 m (5' 4.02\")   Wt 72.4 kg (159 lb 9.6 oz)   LMP  (LMP Unknown)   SpO2 98%   BMI 27.38 kg/m²     Mental Status Examination:  Patients appearance was street clothes. Thoughts are Goal directed. Homicidal ideations none.  No abnormal movements, tics or mannerisms.  Memory intact Aims 0. Concentration Good.   Alert and oriented X 4. Insight and Judgement impaired insight. Patient was cooperative. Patient gait normal. Mood within normal limits, affect normal affect Hallucinations Absent, suicidal ideations no specific plan to harm self Speech normal volume  Data  Labs:   Admission on 10/26/2023   Component Date Value Ref Range Status    Ventricular Rate 10/26/2023 62  BPM Final    Atrial Rate 10/26/2023 62  BPM Final    P-R Interval 10/26/2023 140  ms Final    QRS Duration 10/26/2023 84  ms Final    Q-T Interval 10/26/2023 416  ms Final    QTc Calculation (Bazett) 10/26/2023 422  ms Final    P Axis 10/26/2023 31  degrees Final    R Axis 10/26/2023 58  degrees Final    T Axis 10/26/2023 89  degrees Final    Diagnosis 10/26/2023  WC  gabapentin (NEURONTIN) capsule 400 mg, 400 mg, Oral, BID  vitamin B-12 (CYANOCOBALAMIN) tablet 1,000 mcg, 1,000 mcg, Oral, Daily  vitamin D (ERGOCALCIFEROL) capsule 50,000 Units, 50,000 Units, Oral, Weekly    ASSESSMENT AND PLAN    Principal Problem:    Major depression, recurrent (HCC)  Active Problems:    Dizziness  Resolved Problems:    * No resolved hospital problems. *       1. Patient s symptoms   are improving  2. Probable discharge is tomorrow  3. Discharge planning is complete  4. Suicidal ideation is  none  5. Total time with patient was 50 minutes and more than 50 % of that time was spent counseling the patient on their symptoms, treatment and expected goals.         Gera Blunt MD  Physician Psychiatry

## 2023-10-31 NOTE — PLAN OF CARE
Problem: Pain  Goal: Verbalizes/displays adequate comfort level or baseline comfort level  10/31/2023 1427 by Sol Hodgkins, RN  Outcome: Progressing     Problem: Self Harm/Suicidality  Goal: Will have no self-injury during hospital stay  Description: INTERVENTIONS:  1. Ensure constant observer at bedside with Q15M safety checks  2. Maintain a safe environment  3. Secure patient belongings  4. Ensure family/visitors adhere to safety recommendations  5. Ensure safety tray has been added to patient's diet order  6. Every shift and PRN: Re-assess suicidal risk via Frequent Screener    10/31/2023 1427 by Sol Hodgkins, RN  Outcome: Progressing     Problem: Anxiety  Goal: Will report anxiety at manageable levels  Description: INTERVENTIONS:  1. Administer medication as ordered  2. Teach and rehearse alternative coping skills  3. Provide emotional support with 1:1 interaction with staff  10/31/2023 1427 by Sol Hodgkins, RN  Outcome: Progressing     Problem: Depression/Self Harm  Goal: Effect of psychiatric condition will be minimized and patient will be protected from self harm  Description: INTERVENTIONS:  1. Assess impact of patient's symptoms on level of functioning, self care needs and offer support as indicated  2. Assess patient/family knowledge of depression, impact on illness and need for teaching  3. Provide emotional support, presence and reassurance  4. Assess for possible suicidal thoughts or ideation. If patient expresses suicidal thoughts or statements do not leave alone, initiate Suicide Precautions, move to a room close to the nursing station and obtain sitter  5.  Initiate consults as appropriate with Mental Health Professional, Spiritual Care, Psychosocial CNS, and consider a recommendation to the LIP for a Psychiatric Consultation  10/31/2023 1427 by Sol Hodgkins, RN  Outcome: Progressing     Problem: Nutrition Deficit:  Goal: Optimize nutritional status  Outcome:

## 2023-11-01 VITALS
SYSTOLIC BLOOD PRESSURE: 140 MMHG | RESPIRATION RATE: 16 BRPM | DIASTOLIC BLOOD PRESSURE: 91 MMHG | TEMPERATURE: 98.4 F | WEIGHT: 159.6 LBS | HEIGHT: 64 IN | OXYGEN SATURATION: 98 % | BODY MASS INDEX: 27.25 KG/M2 | HEART RATE: 75 BPM

## 2023-11-01 PROBLEM — F32.2 CURRENT SEVERE EPISODE OF MAJOR DEPRESSIVE DISORDER WITHOUT PSYCHOTIC FEATURES (HCC): Status: ACTIVE | Noted: 2023-11-01

## 2023-11-01 PROCEDURE — 5130000000 HC BRIDGE APPOINTMENT

## 2023-11-01 PROCEDURE — 6370000000 HC RX 637 (ALT 250 FOR IP): Performed by: PSYCHIATRY & NEUROLOGY

## 2023-11-01 RX ORDER — ESCITALOPRAM OXALATE 10 MG/1
10 TABLET ORAL DAILY
Qty: 30 TABLET | Refills: 0 | Status: SHIPPED | OUTPATIENT
Start: 2023-11-02

## 2023-11-01 RX ORDER — DULOXETIN HYDROCHLORIDE 30 MG/1
30 CAPSULE, DELAYED RELEASE ORAL DAILY
Qty: 4 CAPSULE | Refills: 0 | Status: SHIPPED | OUTPATIENT
Start: 2023-11-02 | End: 2023-11-06

## 2023-11-01 RX ORDER — IBUPROFEN 200 MG
TABLET ORAL
Qty: 1 EACH | Refills: 0 | Status: SHIPPED | OUTPATIENT
Start: 2023-11-01

## 2023-11-01 RX ORDER — GABAPENTIN 400 MG/1
400 CAPSULE ORAL 2 TIMES DAILY
Qty: 60 CAPSULE | Refills: 0 | Status: SHIPPED | OUTPATIENT
Start: 2023-11-01 | End: 2023-12-01

## 2023-11-01 RX ADMIN — Medication 1000 MCG: at 08:26

## 2023-11-01 RX ADMIN — CARVEDILOL 6.25 MG: 6.25 TABLET, FILM COATED ORAL at 08:27

## 2023-11-01 RX ADMIN — ACETAMINOPHEN 650 MG: 325 TABLET ORAL at 08:30

## 2023-11-01 RX ADMIN — DULOXETINE HYDROCHLORIDE 30 MG: 30 CAPSULE, DELAYED RELEASE ORAL at 08:26

## 2023-11-01 RX ADMIN — LISINOPRIL 5 MG: 5 TABLET ORAL at 08:26

## 2023-11-01 RX ADMIN — ESCITALOPRAM OXALATE 10 MG: 10 TABLET ORAL at 08:26

## 2023-11-01 RX ADMIN — ASPIRIN 81 MG: 81 TABLET, CHEWABLE ORAL at 08:26

## 2023-11-01 RX ADMIN — CLOPIDOGREL BISULFATE 75 MG: 75 TABLET ORAL at 08:26

## 2023-11-01 RX ADMIN — GABAPENTIN 400 MG: 400 CAPSULE ORAL at 08:26

## 2023-11-01 RX ADMIN — POLYMYXIN B SULFATE, BACITRACIN ZINC, NEOMYCIN SULFATE: 5000; 3.5; 4 OINTMENT TOPICAL at 10:17

## 2023-11-01 ASSESSMENT — PAIN DESCRIPTION - DESCRIPTORS: DESCRIPTORS: ACHING

## 2023-11-01 ASSESSMENT — PAIN SCALES - GENERAL
PAINLEVEL_OUTOF10: 5
PAINLEVEL_OUTOF10: 8

## 2023-11-01 ASSESSMENT — PAIN DESCRIPTION - LOCATION: LOCATION: HEAD

## 2023-11-01 NOTE — PROGRESS NOTES
Pt alert and oriented x4. Pt denies SI/HI/AVH. Pt tearful stating she is feeling anxious about the unknowns of going home. Pt stated,, \"I just want to be normal\" Pt reassured. Pt requested supplies to take a shower/given. Pt states she believes a shower will help her. Pt denied any further needs at this time.

## 2023-11-01 NOTE — PROGRESS NOTES
Group Therapy Note    Date: 10/31/2023  Start Time: 20:00  End Time:  21:00  Number of Participants: 8    Type of Group: Recreational  wrap up    Wellness Binder Information  Module Name:  /  Session Number:  /    Patient's Goal:  D/c planning    Notes:  continuing to work on goal    Status After Intervention:  Unchanged    Participation Level:  Active Listener and Interactive    Participation Quality: Appropriate, Attentive, and Sharing      Speech:  normal      Thought Process/Content: Logical      Affective Functioning: Blunted      Mood:  stable      Level of consciousness:  Alert and Attentive      Response to Learning: Able to change behavior      Endings: None Reported    Modes of Intervention: Socialization and Problem-solving      Discipline Responsible: Behavorial Health Tech      Signature:  Melly Tirado

## 2023-11-01 NOTE — PROGRESS NOTES
CLINICAL PHARMACY NOTE: MEDS TO BEDS    Total # of Prescriptions Filled: 3   The following medications were delivered to the patient:  Duloxetine 30mg #4 1qd x4  Escitalopram 10mg #30 1qd  Gabapentin 400mg #60 1qam and 1hs    Additional Documentation:

## 2023-11-01 NOTE — PLAN OF CARE
Patient alert and oriented x 3. Patient rates Depression 0/10 and Anxiety 5/10. Patient visible and social on the milieu. Patient denies SI/HI/A/V/H. Patient took HS medications. Patient denies self harm. No c/o's voiced @ present.

## 2023-11-01 NOTE — PROGRESS NOTES
Bridge Appointment completed: Reviewed Discharge Instructions with patient. Patient verbalizes understanding and agreement with the discharge plan using the teachback method.      Vaccinations (jacobo X if applicable and completed):  ( ) Patient states already received influenza vaccine elsewhere  ( ) Patient received influenza vaccine during this hospitalization  ( x) Patient refused influenza vaccine at this time  ( ) Not offered

## 2023-11-01 NOTE — PLAN OF CARE
Problem: Pain  Goal: Verbalizes/displays adequate comfort level or baseline comfort level  Outcome: Completed     Problem: Self Harm/Suicidality  Goal: Will have no self-injury during hospital stay  Description: INTERVENTIONS:  1. Ensure constant observer at bedside with Q15M safety checks  2. Maintain a safe environment  3. Secure patient belongings  4. Ensure family/visitors adhere to safety recommendations  5. Ensure safety tray has been added to patient's diet order  6. Every shift and PRN: Re-assess suicidal risk via Frequent Screener    Outcome: Completed     Problem: Anxiety  Goal: Will report anxiety at manageable levels  Description: INTERVENTIONS:  1. Administer medication as ordered  2. Teach and rehearse alternative coping skills  3. Provide emotional support with 1:1 interaction with staff  Outcome: Completed     Problem: Depression/Self Harm  Goal: Effect of psychiatric condition will be minimized and patient will be protected from self harm  Description: INTERVENTIONS:  1. Assess impact of patient's symptoms on level of functioning, self care needs and offer support as indicated  2. Assess patient/family knowledge of depression, impact on illness and need for teaching  3. Provide emotional support, presence and reassurance  4. Assess for possible suicidal thoughts or ideation. If patient expresses suicidal thoughts or statements do not leave alone, initiate Suicide Precautions, move to a room close to the nursing station and obtain sitter  5.  Initiate consults as appropriate with Mental Health Professional, Spiritual Care, Psychosocial CNS, and consider a recommendation to the LIP for a Psychiatric Consultation  Outcome: Completed

## 2023-11-01 NOTE — PROGRESS NOTES
[facility]  Discharge Note    Pt discharged with followings belongings:   Dental Appliances: Uppers, Lowers  Vision - Corrective Lenses: Eyeglasses (reading)  Hearing Aid: None  Jewelry: Ring  Body Piercings Removed: N/A  Clothing: Footwear, Shirt, Undergarments, Pants  Other Valuables: [place], Lighter/Matches   Valuables returned to patient. Patient educated on aftercare instructions: yes  . Patient verbalize understanding of AVS:  yes. Status EXAM upon discharge:  Mental Status and Behavioral Exam  Normal: No  Level of Assistance: Independent/Self  Facial Expression: Worried  Affect: Appropriate  Level of Consciousness: Alert  Frequency of Checks: 4 times per hour, close  Mood:Normal: No  Mood: Anxious  Motor Activity:Normal: Yes  Motor Activity: Decreased  Eye Contact: Good  Observed Behavior: Cooperative, Friendly, Tearful  Sexual Misconduct History: Current - no  Preception: [place] to person, [place] to time, [place] to place, [place] to situation  Attention:Normal: Yes  Attention: Others (comment) (WNL)  Thought Processes: Unremarkable  Thought Content:Normal: Yes  Thought Content: Paranoia  Depression Symptoms: No problems reported or observed. Anxiety Symptoms: Generalized  [place] Symptoms: No problems reported or observed. Hallucinations: None (pt denies)  Delusions: No  Memory:Normal: Yes  Insight and Judgment: No  Insight and Judgment: Poor judgment, Poor insight    Tobacco Screening:  Practical Counseling, on admission, jacobo X, if applicable and completed (first 3 are required if patient doesn't refuse):             ( x) Recognizing danger situations (included triggers and roadblocks)                    ( x) Coping skills (new ways to manage stress,relaxation techniques, changing routine, distraction)                                                           ( x) Basic information about quitting (benefits of quitting, techniques in how to quit, available resources  ( ) Referral for

## 2023-11-02 ENCOUNTER — FOLLOWUP TELEPHONE ENCOUNTER (OUTPATIENT)
Dept: PSYCHIATRY | Age: 58
End: 2023-11-02

## 2023-11-03 ENCOUNTER — FOLLOWUP TELEPHONE ENCOUNTER (OUTPATIENT)
Dept: PSYCHIATRY | Age: 58
End: 2023-11-03

## 2023-11-06 ENCOUNTER — FOLLOWUP TELEPHONE ENCOUNTER (OUTPATIENT)
Dept: PSYCHIATRY | Age: 58
End: 2023-11-06

## 2024-07-22 NOTE — FLOWSHEET NOTE
CALLED PATIENT AND SPOKE WITH HER   Purposeful Rounding    Patient Location: Patient room    Patient willing to engage in conversation: pt sleeping    Presentation/behavior: pt sleeping    Affect: pt sleeping    Concerns reported: none noted, pt sleeping    PRN medications given: none    Environmental assessment: Room free from clutter, Clear path to bathroom , Adequate lighting and No safety hazards noted    Fall prevention interventions in place: Lighting appropriate, Room free of clutter and Clear path to bathroom      Daily [place] Fall Risk Score: low      Electronically signed by Jarrod Plata RN on 12/14/21 at 4:36 AM EST